# Patient Record
Sex: FEMALE | Race: WHITE | Employment: FULL TIME | URBAN - METROPOLITAN AREA
[De-identification: names, ages, dates, MRNs, and addresses within clinical notes are randomized per-mention and may not be internally consistent; named-entity substitution may affect disease eponyms.]

---

## 2017-04-26 ENCOUNTER — OFFICE VISIT (OUTPATIENT)
Dept: INTERNAL MEDICINE CLINIC | Age: 57
End: 2017-04-26

## 2017-04-26 VITALS
SYSTOLIC BLOOD PRESSURE: 129 MMHG | TEMPERATURE: 99.3 F | BODY MASS INDEX: 33.69 KG/M2 | HEART RATE: 78 BPM | WEIGHT: 204 LBS | OXYGEN SATURATION: 95 % | DIASTOLIC BLOOD PRESSURE: 87 MMHG

## 2017-04-26 DIAGNOSIS — J02.9 PHARYNGITIS, UNSPECIFIED ETIOLOGY: Primary | ICD-10-CM

## 2017-04-26 LAB — S PYO AG THROAT QL: NORMAL

## 2017-04-26 PROCEDURE — 99213 OFFICE O/P EST LOW 20 MIN: CPT | Performed by: NURSE PRACTITIONER

## 2017-04-26 PROCEDURE — 87880 STREP A ASSAY W/OPTIC: CPT | Performed by: NURSE PRACTITIONER

## 2017-04-26 RX ORDER — AMOXICILLIN 500 MG/1
500 CAPSULE ORAL 2 TIMES DAILY
Qty: 20 CAPSULE | Refills: 0 | Status: SHIPPED | OUTPATIENT
Start: 2017-04-26 | End: 2017-05-06

## 2017-04-28 LAB — THROAT CULTURE: NORMAL

## 2017-05-30 RX ORDER — AMLODIPINE BESYLATE 10 MG/1
10 TABLET ORAL DAILY
Qty: 30 TABLET | Refills: 0 | Status: SHIPPED | OUTPATIENT
Start: 2017-05-30 | End: 2017-06-13 | Stop reason: SDUPTHER

## 2017-06-13 ENCOUNTER — OFFICE VISIT (OUTPATIENT)
Dept: INTERNAL MEDICINE CLINIC | Age: 57
End: 2017-06-13

## 2017-06-13 VITALS
WEIGHT: 207 LBS | SYSTOLIC BLOOD PRESSURE: 133 MMHG | OXYGEN SATURATION: 97 % | HEART RATE: 74 BPM | DIASTOLIC BLOOD PRESSURE: 84 MMHG | BODY MASS INDEX: 34.18 KG/M2

## 2017-06-13 DIAGNOSIS — I10 ESSENTIAL HYPERTENSION: ICD-10-CM

## 2017-06-13 DIAGNOSIS — Z01.818 PREOP EXAMINATION: Primary | ICD-10-CM

## 2017-06-13 DIAGNOSIS — M16.12 OSTEOARTHRITIS OF LEFT HIP, UNSPECIFIED OSTEOARTHRITIS TYPE: ICD-10-CM

## 2017-06-13 DIAGNOSIS — E03.9 ACQUIRED HYPOTHYROIDISM: ICD-10-CM

## 2017-06-13 DIAGNOSIS — Z86.718 HISTORY OF DVT (DEEP VEIN THROMBOSIS): ICD-10-CM

## 2017-06-13 DIAGNOSIS — Z01.818 PREOP EXAMINATION: ICD-10-CM

## 2017-06-13 LAB
HCT VFR BLD CALC: 41.6 % (ref 36–48)
HEMOGLOBIN: 13.6 G/DL (ref 12–16)
INR BLD: 0.96 (ref 0.85–1.15)
MCH RBC QN AUTO: 28.1 PG (ref 26–34)
MCHC RBC AUTO-ENTMCNC: 32.7 G/DL (ref 31–36)
MCV RBC AUTO: 85.9 FL (ref 80–100)
PDW BLD-RTO: 14.1 % (ref 12.4–15.4)
PLATELET # BLD: 247 K/UL (ref 135–450)
PMV BLD AUTO: 8.2 FL (ref 5–10.5)
PROTHROMBIN TIME: 10.8 SEC (ref 9.6–13)
RBC # BLD: 4.84 M/UL (ref 4–5.2)
WBC # BLD: 6.8 K/UL (ref 4–11)

## 2017-06-13 PROCEDURE — 99214 OFFICE O/P EST MOD 30 MIN: CPT | Performed by: INTERNAL MEDICINE

## 2017-06-13 PROCEDURE — 93000 ELECTROCARDIOGRAM COMPLETE: CPT | Performed by: INTERNAL MEDICINE

## 2017-06-13 RX ORDER — AMLODIPINE BESYLATE 10 MG/1
10 TABLET ORAL DAILY
Qty: 90 TABLET | Refills: 1 | Status: SHIPPED | OUTPATIENT
Start: 2017-06-13 | End: 2018-01-04 | Stop reason: SDUPTHER

## 2017-06-14 LAB
ANION GAP SERPL CALCULATED.3IONS-SCNC: 20 MMOL/L (ref 3–16)
BUN BLDV-MCNC: 15 MG/DL (ref 7–20)
CALCIUM SERPL-MCNC: 9.4 MG/DL (ref 8.3–10.6)
CHLORIDE BLD-SCNC: 101 MMOL/L (ref 99–110)
CO2: 23 MMOL/L (ref 21–32)
CREAT SERPL-MCNC: 0.7 MG/DL (ref 0.6–1.1)
GFR AFRICAN AMERICAN: >60
GFR NON-AFRICAN AMERICAN: >60
GLUCOSE BLD-MCNC: 92 MG/DL (ref 70–99)
POTASSIUM SERPL-SCNC: 3.9 MMOL/L (ref 3.5–5.1)
SODIUM BLD-SCNC: 144 MMOL/L (ref 136–145)
TSH REFLEX: 4.03 UIU/ML (ref 0.27–4.2)

## 2017-06-15 RX ORDER — LEVOTHYROXINE SODIUM 0.07 MG/1
TABLET ORAL
Qty: 90 TABLET | Refills: 0 | Status: SHIPPED | OUTPATIENT
Start: 2017-06-15 | End: 2017-08-22 | Stop reason: SDUPTHER

## 2017-06-16 LAB — MRSA CULTURE ONLY: NORMAL

## 2017-08-18 DIAGNOSIS — E03.9 ACQUIRED HYPOTHYROIDISM: ICD-10-CM

## 2017-08-18 LAB — TSH REFLEX: 2.42 UIU/ML (ref 0.27–4.2)

## 2017-08-21 ENCOUNTER — TELEPHONE (OUTPATIENT)
Dept: INTERNAL MEDICINE CLINIC | Age: 57
End: 2017-08-21

## 2017-08-22 RX ORDER — LEVOTHYROXINE SODIUM 0.07 MG/1
TABLET ORAL
Qty: 90 TABLET | Refills: 0 | Status: SHIPPED | OUTPATIENT
Start: 2017-08-22 | End: 2017-10-03 | Stop reason: SDUPTHER

## 2017-10-03 ENCOUNTER — TELEPHONE (OUTPATIENT)
Dept: INTERNAL MEDICINE CLINIC | Age: 57
End: 2017-10-03

## 2017-10-03 RX ORDER — LEVOTHYROXINE SODIUM 0.07 MG/1
TABLET ORAL
Qty: 90 TABLET | Refills: 0 | Status: SHIPPED | OUTPATIENT
Start: 2017-10-03 | End: 2018-01-04 | Stop reason: SDUPTHER

## 2017-10-03 NOTE — TELEPHONE ENCOUNTER
Pt needs refill of levothyroxine (SYNTHROID) 75 MCG tablet [[id number]]     [facility] [address], [place]

## 2017-11-28 ENCOUNTER — TELEPHONE (OUTPATIENT)
Dept: INTERNAL MEDICINE CLINIC | Age: 57
End: 2017-11-28

## 2017-11-28 ENCOUNTER — PATIENT MESSAGE (OUTPATIENT)
Dept: INTERNAL MEDICINE CLINIC | Age: 57
End: 2017-11-28

## 2017-11-28 NOTE — TELEPHONE ENCOUNTER
Pt stated that she has found a mass on the apex of her left mandible. She has been watching it for a month now. She is requesting a appt with Dr Jordana Mclean, pt refused appt with another doctor. Pt wants someone to do a fine needle aspiration to find out what it is. Please advise.

## 2017-11-29 NOTE — TELEPHONE ENCOUNTER
From: Max Tineo  To: Rafaela Mills MD  Sent: 11/28/2017 6:05 PM EST  Subject: Non-Urgent Medical Question    Hi Dr. Jason Morton: This is NOT an non-urgent medical question, but rather an urgent one. I have a mass that has developed at the apex of the mandible on the left side. It is approximately 3/4\" in diameter and hard. It is movable, but seems to be embedded in muscle. I called your office today to try to move my appointment with you on 12/19/17 up. Naturally I am highly concerned that it is malignant. I left a detailed message with Nic German who informed me the nurse would call me back, however, your staff has ignored this message and did not return my call. I need to know if you can do a fine needle aspirate in your office. If not, can you please refer me immediately to someone who can. Can you please respond asap.      Thank you,  Joy Lopez

## 2017-12-04 ENCOUNTER — OFFICE VISIT (OUTPATIENT)
Dept: ENT CLINIC | Age: 57
End: 2017-12-04

## 2017-12-04 VITALS — DIASTOLIC BLOOD PRESSURE: 82 MMHG | HEART RATE: 72 BPM | SYSTOLIC BLOOD PRESSURE: 124 MMHG | HEIGHT: 66 IN

## 2017-12-04 DIAGNOSIS — K11.8 PAROTID MASS: Primary | ICD-10-CM

## 2017-12-04 PROCEDURE — 99203 OFFICE O/P NEW LOW 30 MIN: CPT | Performed by: OTOLARYNGOLOGY

## 2017-12-04 RX ORDER — CEFDINIR 300 MG/1
300 CAPSULE ORAL 2 TIMES DAILY
Qty: 20 CAPSULE | Refills: 0 | Status: SHIPPED | OUTPATIENT
Start: 2017-12-04 | End: 2018-01-09 | Stop reason: CLARIF

## 2017-12-04 ASSESSMENT — ENCOUNTER SYMPTOMS
FACIAL SWELLING: 1
TROUBLE SWALLOWING: 0
SINUS PRESSURE: 0
SINUS PAIN: 0
RESPIRATORY NEGATIVE: 1
RHINORRHEA: 0
ALLERGIC/IMMUNOLOGIC NEGATIVE: 1
SORE THROAT: 0
VOICE CHANGE: 0
EYES NEGATIVE: 1

## 2017-12-04 NOTE — PROGRESS NOTES
Subjective:      Patient ID: Diego Padilla is a 61 y.o. female. Past 1 month, the patient has noticed a firm lesion which is tender only to touch located behind the angle of the mandible on the left side. There has been no change in bleeding and no fever. She has not been on any medications. She does not smoke. There is no prior history of similar problem. HPI    Review of Systems   Constitutional: Negative. HENT: Positive for facial swelling. Negative for congestion, dental problem, drooling, ear discharge, ear pain, hearing loss, mouth sores, nosebleeds, postnasal drip, rhinorrhea, sinus pain, sinus pressure, sneezing, sore throat, tinnitus, trouble swallowing and voice change. Eyes: Negative. Respiratory: Negative. Endocrine: Negative. Skin: Negative. Allergic/Immunologic: Negative. Neurological: Negative. Hematological: Negative. Psychiatric/Behavioral: Negative. Objective:   Physical Exam   Constitutional: She is oriented to person, place, and time. She appears well-developed and well-nourished. HENT:   Head: Normocephalic and atraumatic. Right Ear: External ear normal.   Left Ear: External ear normal.   Nose: Nose normal.   Mouth/Throat: Oropharynx is clear and moist. No oropharyngeal exudate. Direct laryngoscopy is unremarkable. Eyes: Conjunctivae are normal. Pupils are equal, round, and reactive to light. Neck: Normal range of motion. Neck supple. No tracheal deviation present. No thyromegaly present. There is a 1 cm firm but not stony hard mobile mass located in the area of the left parotid near the tail. Cardiovascular: Normal rate and regular rhythm. Pulmonary/Chest: Effort normal.   Lymphadenopathy:     She has no cervical adenopathy. Neurological: She is alert and oriented to person, place, and time. Skin: Skin is warm and dry. Psychiatric: She has a normal mood and affect.  Her behavior is normal. Judgment and thought content normal. Assessment:      Findings of those of a parotid mass on the left side which could be a lymph node versus neoplastic process. Plan:      I have discussed this at length with the patient and we will proceed with a CT scan of the area and we will try a course of Omnicef. She may well need a fine-needle aspirate biopsy. She does seem to be more concerned with the possibility of it being a lymphoma which would be highly unlikely.

## 2017-12-08 ENCOUNTER — HOSPITAL ENCOUNTER (OUTPATIENT)
Dept: CT IMAGING | Age: 57
Discharge: HOME | End: 2017-12-08
Attending: OTOLARYNGOLOGY | Admitting: OTOLARYNGOLOGY

## 2017-12-08 DIAGNOSIS — K11.9 DISEASE OF SALIVARY GLAND: ICD-10-CM

## 2017-12-08 DIAGNOSIS — K11.8 PAROTID MASS: ICD-10-CM

## 2017-12-21 ENCOUNTER — TELEPHONE (OUTPATIENT)
Dept: ENT CLINIC | Age: 57
End: 2017-12-21

## 2017-12-21 DIAGNOSIS — K11.8 PAROTID MASS: Primary | ICD-10-CM

## 2017-12-22 NOTE — TELEPHONE ENCOUNTER
Patient returned call - Okay to schedule FNA - Doesn't matter which facility - would like to try to have before the new year so which ever facility can get her in soonest. ([facility]; [address] [facility])    Patient will be leaving out of town tomorrow and returning 12/28    Please contact on cell with appointment date and time [phone]

## 2017-12-27 NOTE — TELEPHONE ENCOUNTER
Call patient back and tell CT actually looks OK and no need for biopsy but I would like to see her on my return. Cancel test and call radiology.

## 2018-01-03 ENCOUNTER — TELEPHONE (OUTPATIENT)
Dept: ENT CLINIC | Age: 58
End: 2018-01-03

## 2018-01-03 DIAGNOSIS — K11.20 PAROTITIS: Primary | ICD-10-CM

## 2018-01-03 RX ORDER — DOXYCYCLINE 100 MG/1
100 CAPSULE ORAL 2 TIMES DAILY
Qty: 60 CAPSULE | Refills: 0 | Status: SHIPPED | OUTPATIENT
Start: 2018-01-03 | End: 2018-02-20 | Stop reason: CLARIF

## 2018-01-03 NOTE — TELEPHONE ENCOUNTER
Attempted to call patient. Left message that no FNA testing needs to be done at this time (no neoplasm no CA no tumor). Take antibiotic (sent to pharmacy) and mucinex and make appointment for 1 month. Call office with questions.

## 2018-01-04 ENCOUNTER — TELEPHONE (OUTPATIENT)
Dept: INTERNAL MEDICINE CLINIC | Age: 58
End: 2018-01-04

## 2018-01-04 RX ORDER — LEVOTHYROXINE SODIUM 0.07 MG/1
TABLET ORAL
Qty: 90 TABLET | Refills: 0 | Status: SHIPPED | OUTPATIENT
Start: 2018-01-04 | End: 2018-02-02 | Stop reason: SDUPTHER

## 2018-01-04 RX ORDER — AMLODIPINE BESYLATE 10 MG/1
10 TABLET ORAL DAILY
Qty: 90 TABLET | Refills: 0 | Status: SHIPPED | OUTPATIENT
Start: 2018-01-04 | End: 2018-02-02 | Stop reason: SDUPTHER

## 2018-01-04 NOTE — TELEPHONE ENCOUNTER
Last OV was 6/13/17 for Pre op  Was to return in December but canceled 2 FU  Has appt for later this month  Please advise on refills  They are pending for signature  Thank you!

## 2018-01-05 NOTE — TELEPHONE ENCOUNTER
Attempted to call patient. Left message again that NO FNA is necessary as there was NO neoplasm. Call office for 1 month F/U appointment. Take antibiotic X 1 month and mucinex (doxycycline - sent to pharmacy). Call office with questions.

## 2018-01-08 PROBLEM — K11.20 PAROTITIS: Status: RESOLVED | Noted: 2018-01-03 | Resolved: 2018-01-08

## 2018-01-09 ENCOUNTER — OFFICE VISIT (OUTPATIENT)
Dept: INTERNAL MEDICINE CLINIC | Age: 58
End: 2018-01-09

## 2018-01-09 VITALS
SYSTOLIC BLOOD PRESSURE: 120 MMHG | DIASTOLIC BLOOD PRESSURE: 82 MMHG | BODY MASS INDEX: 33.57 KG/M2 | WEIGHT: 208 LBS | HEART RATE: 78 BPM

## 2018-01-09 DIAGNOSIS — I10 ESSENTIAL HYPERTENSION: ICD-10-CM

## 2018-01-09 DIAGNOSIS — E66.9 OBESITY (BMI 30.0-34.9): ICD-10-CM

## 2018-01-09 DIAGNOSIS — E03.9 ACQUIRED HYPOTHYROIDISM: Primary | ICD-10-CM

## 2018-01-09 DIAGNOSIS — M19.90 ARTHRITIS: ICD-10-CM

## 2018-01-09 PROCEDURE — 99214 OFFICE O/P EST MOD 30 MIN: CPT | Performed by: INTERNAL MEDICINE

## 2018-01-09 ASSESSMENT — PATIENT HEALTH QUESTIONNAIRE - PHQ9
7. TROUBLE CONCENTRATING ON THINGS, SUCH AS READING THE NEWSPAPER OR WATCHING TELEVISION: 0
SUM OF ALL RESPONSES TO PHQ9 QUESTIONS 1 & 2: 4
4. FEELING TIRED OR HAVING LITTLE ENERGY: 3
SUM OF ALL RESPONSES TO PHQ QUESTIONS 1-9: 14
3. TROUBLE FALLING OR STAYING ASLEEP: 3
9. THOUGHTS THAT YOU WOULD BE BETTER OFF DEAD, OR OF HURTING YOURSELF: 1
6. FEELING BAD ABOUT YOURSELF - OR THAT YOU ARE A FAILURE OR HAVE LET YOURSELF OR YOUR FAMILY DOWN: 0
1. LITTLE INTEREST OR PLEASURE IN DOING THINGS: 1
5. POOR APPETITE OR OVEREATING: 3
2. FEELING DOWN, DEPRESSED OR HOPELESS: 3
8. MOVING OR SPEAKING SO SLOWLY THAT OTHER PEOPLE COULD HAVE NOTICED. OR THE OPPOSITE, BEING SO FIGETY OR RESTLESS THAT YOU HAVE BEEN MOVING AROUND A LOT MORE THAN USUAL: 0

## 2018-01-09 NOTE — PROGRESS NOTES
[address] Internal Medicine  Patient Encounter   Jennifer Larson MD    1/9/2018    Aníbal Kenyon  SUF:[birth year]       Assessment/Plan:  Acquired hypothyroidism  Symptomatically euthyroid. Continue current dose of levothyroxine. Check TSH early February (recently ran out and missed some doses) and adjust dose if indicated. Essential hypertension  At goal.  Continue current meds. -     Comprehensive Metabolic Panel; Future    Arthritis  Found very manageable with meloxicam. Would like to use that or celebrex on regular basis but first checking with insurance on formulary While waiting, will try tylenol arthritis. . Discussed risk and benefits of NSAIDS and regular monitoring needed. Obesity  Stopped phentermine due to effects on BP. Getting back to walking as temps out of single digits. Neck/parotid nodule  Working with Dr Aida Gaucher, now on doxycycline for a month. Will notify him if increases (normal CT scan)    Discussed medications with patient who voiced understanding of their use and indications. All questions answered. Return in about 6 months (around 7/9/2018). Medical assistant triage:  Chief Complaint   Patient presents with    6 Month Follow-Up     due for mammogram      HPI:   Patient presents for follow-up of   1. Acquired hypothyroidism    2. Essential hypertension    3. Arthritis    4. Obesity (BMI 30.0-34. 9)      Also wants to discuss pain from her arthritis. Noticed that when she stopped the meloxicam after surgery, she had a lot more pain and even affects energy level. Has been using ibuprofen. Gets heartburn when uses the ibuprofen, but didn't have with the meloxicam, but was also on omeprazole with it. Wants to try celebrex. Doesn't have formulary yet for new insurance. Has seen Dr. Aida Gaucher for the mass in parotid. Has had CT scan. Just started on  Doxycycline this am for a month and follows up with him in a month. Not sure what next step is. Stable for about 3 months. from Last 3 Encounters:   01/09/18 120/82   12/04/17 124/82   06/13/17 133/84      Wt Readings from Last 3 Encounters:   01/09/18 208 lb (94.3 kg)   06/13/17 207 lb (93.9 kg)   04/26/17 204 lb (92.5 kg)     Vitals:    01/09/18 0957   BP: 120/82   Pulse: 78   Weight: 208 lb (94.3 kg)     Body mass index is 33.57 kg/m². Physical Exam   Constitutional: No distress. Neck:   approx 1 cm firm smooth mobile nodule present at upper anterior chain versus tail of parotid. Cardiovascular: Normal rate and regular rhythm. Pulmonary/Chest: Effort normal and breath sounds normal.   Musculoskeletal: She exhibits no edema.

## 2018-02-01 ENCOUNTER — PATIENT MESSAGE (OUTPATIENT)
Dept: INTERNAL MEDICINE CLINIC | Age: 58
End: 2018-02-01

## 2018-02-02 RX ORDER — AMLODIPINE BESYLATE 10 MG/1
10 TABLET ORAL DAILY
Qty: 90 TABLET | Refills: 1 | Status: SHIPPED | OUTPATIENT
Start: 2018-02-02 | End: 2018-06-29 | Stop reason: SDUPTHER

## 2018-02-02 RX ORDER — LEVOTHYROXINE SODIUM 0.07 MG/1
TABLET ORAL
Qty: 90 TABLET | Refills: 1 | Status: SHIPPED | OUTPATIENT
Start: 2018-02-02 | End: 2018-06-29 | Stop reason: SDUPTHER

## 2018-02-20 ENCOUNTER — OFFICE VISIT (OUTPATIENT)
Dept: INTERNAL MEDICINE CLINIC | Age: 58
End: 2018-02-20

## 2018-02-20 VITALS — SYSTOLIC BLOOD PRESSURE: 122 MMHG | HEART RATE: 76 BPM | DIASTOLIC BLOOD PRESSURE: 86 MMHG

## 2018-02-20 DIAGNOSIS — Z01.818 PREOP EXAM FOR INTERNAL MEDICINE: Primary | ICD-10-CM

## 2018-02-20 DIAGNOSIS — Z13.31 POSITIVE DEPRESSION SCREENING: ICD-10-CM

## 2018-02-20 DIAGNOSIS — G47.30 SLEEP APNEA, UNSPECIFIED TYPE: ICD-10-CM

## 2018-02-20 DIAGNOSIS — G47.00 INSOMNIA, UNSPECIFIED TYPE: ICD-10-CM

## 2018-02-20 DIAGNOSIS — G47.33 OBSTRUCTIVE SLEEP APNEA SYNDROME: ICD-10-CM

## 2018-02-20 PROCEDURE — 99243 OFF/OP CNSLTJ NEW/EST LOW 30: CPT | Performed by: INTERNAL MEDICINE

## 2018-02-20 PROCEDURE — G8431 POS CLIN DEPRES SCRN F/U DOC: HCPCS | Performed by: INTERNAL MEDICINE

## 2018-02-20 NOTE — PROGRESS NOTES
Additional note:   Patient had a positive pH Q9 last month while here. Declines any form of treatment at this point states is related to her worry about the mass. She is currently dealing with.

## 2018-02-20 NOTE — PROGRESS NOTES
[address]- Internal Medicine  Preoperative Consultation  Gavin Leigh MD    2018    Mirella Lima  :  1960    Chief Complaint   Patient presents with    Pre-op Exam     patient is having parotid mass removal surgery on 2018 at [facility].        HPI:  This patient presents to the office today for a preoperative consultation at the request of Dr. Favio Olson to assess stability of patient's medical condition(s) listed below. She will be having left parotid mass excision. Mass left mandible/parotid area since approx October-November. Biopsy was negative but increasing size and discomfort. HTN:No chest pain or dyspnea. No problem with medication. Hypothyroid:  Denies any symptoms relatable to thyroid. ROS  Known anesthesia problems: vomiting only   Bleeding risk: No recent or remote history of abnormal bleeding  Personal or FH of DVT/PE: Yes - DVT after arthroscopy of knee . Family history as well. Recent steroid use: medrol pack early February. Exercise tolerance: good; swims 45 minutes non-stop. Past Medical History:   Diagnosis Date    Abnormal finding on EKG 3/21/2013    Normal stress myoview 3/21/13     Arthritis     widespread, former ballerina    Degenerative disc disease, cervical     cervical  disc fusion C4-C7;  Dr. Florencia Deleon    History of depression     History of DVT (deep vein thrombosis)     s/p arthroscopy, left    Migraines     Sleep apnea     no cpap    Thyroid disease      Past Surgical History:   Procedure Laterality Date    CERVICAL FUSION      C4-7, decompressive anterior discectomies and foraminotomiesC4-7;Bohinski    HIP ARTHROPLASTY Right [id number]    RIGHT TOTAL HIP ARTHROPLASTY ANTERIOR APPROACH    HIP ARTHROPLASTY Left 2017    JOINT REPLACEMENT Left 2017    Dr. Cielo Omalley ARTHROSCOPY      bilateral, multiple    NASAL SEPTUM SURGERY      PLANTAR FASCIA SURGERY      left; Dr. Krystina Paez      left;

## 2018-02-21 ENCOUNTER — TELEPHONE (OUTPATIENT)
Dept: INTERNAL MEDICINE CLINIC | Age: 58
End: 2018-02-21

## 2018-02-21 ENCOUNTER — NURSE ONLY (OUTPATIENT)
Dept: INTERNAL MEDICINE CLINIC | Age: 58
End: 2018-02-21

## 2018-02-21 DIAGNOSIS — Z01.818 PRE-OP EXAM: Primary | ICD-10-CM

## 2018-02-21 NOTE — TELEPHONE ENCOUNTER
Patient came in today to have pre-op ekg performed for her surgery on Tuesday. Patient was frustrated that she had to be here again today to have test performed. I got patient ready for ekg and attached all the lead stickers, but after hooking up the ekg to the computer, the computer was not responding to the ekg. I then moved the computer from another room to the one I was in to keep from having to move the patient. The ekg connected but the left leg lead would not read. I attempted multiple times to get it to connect, including changing the stickers, taping them down and utilizing another ekg machine to try to get the ekg to work. I apologized multiple times to the patient since I knew she felt inconvenienced. Both ekg machines would not read the left leg lead. Patient was becoming increasingly irate and using foul language. Patient then sat up and jerked all the leads off of her, and the half gown she was wearing and crumbled it up and threw it at me. I stepped out of the way, and patient got dressed and very aggressively swung the door open and stomped out. I was almost hit with the door as she left.

## 2018-02-22 ENCOUNTER — PATIENT MESSAGE (OUTPATIENT)
Dept: INTERNAL MEDICINE CLINIC | Age: 58
End: 2018-02-22

## 2018-02-22 NOTE — TELEPHONE ENCOUNTER
From: Lethaniel Buerger  To: Brandee Barry MD  Sent: 2/22/2018 6:33 AM EST  Subject: Visit Follow-Up Question    Dr. Radha Cosem: I will call [facility] and find out the name of the person I spoke with yesterday. I will complain, but nothing will be done. Some other person will pay for a test they don't need, accumulate debt they don't need, and may have less ability to deal with it than I have. While I have the utmost respect for your medical advice and have generally had good experiences in your office (except for yesterday), I have been really let down when it comes to this mass. The consequences to the medical community are none. To me, however, they are huge. You do not know the delicate dance I do daily-my efforts to be proactive for my health will continue to fail. I give up. Whatever happens is what I will get.  [id number] [facility]

## 2018-06-29 ENCOUNTER — OFFICE VISIT (OUTPATIENT)
Dept: INTERNAL MEDICINE CLINIC | Age: 58
End: 2018-06-29

## 2018-06-29 VITALS
SYSTOLIC BLOOD PRESSURE: 132 MMHG | BODY MASS INDEX: 33.41 KG/M2 | HEART RATE: 80 BPM | WEIGHT: 207 LBS | DIASTOLIC BLOOD PRESSURE: 90 MMHG

## 2018-06-29 DIAGNOSIS — R73.9 HYPERGLYCEMIA: ICD-10-CM

## 2018-06-29 DIAGNOSIS — K11.8 PAROTID MASS: ICD-10-CM

## 2018-06-29 DIAGNOSIS — E03.9 ACQUIRED HYPOTHYROIDISM: ICD-10-CM

## 2018-06-29 DIAGNOSIS — I10 ESSENTIAL HYPERTENSION: ICD-10-CM

## 2018-06-29 DIAGNOSIS — F32.A DEPRESSION, UNSPECIFIED DEPRESSION TYPE: Primary | ICD-10-CM

## 2018-06-29 DIAGNOSIS — G47.00 INSOMNIA, UNSPECIFIED TYPE: ICD-10-CM

## 2018-06-29 PROCEDURE — G0444 DEPRESSION SCREEN ANNUAL: HCPCS | Performed by: INTERNAL MEDICINE

## 2018-06-29 PROCEDURE — 99214 OFFICE O/P EST MOD 30 MIN: CPT | Performed by: INTERNAL MEDICINE

## 2018-06-29 RX ORDER — FLUOXETINE 20 MG/1
20 TABLET, FILM COATED ORAL DAILY
Qty: 30 TABLET | Refills: 3 | Status: SHIPPED | OUTPATIENT
Start: 2018-06-29 | End: 2018-08-06 | Stop reason: SDUPTHER

## 2018-06-29 RX ORDER — LORAZEPAM 1 MG/1
.5-1 TABLET ORAL NIGHTLY PRN
Qty: 15 TABLET | Refills: 0 | Status: SHIPPED | OUTPATIENT
Start: 2018-06-29 | End: 2018-07-14

## 2018-06-29 RX ORDER — FLUOXETINE HYDROCHLORIDE 20 MG/1
20 CAPSULE ORAL DAILY
Qty: 30 CAPSULE | Refills: 5 | Status: SHIPPED | OUTPATIENT
Start: 2018-06-29 | End: 2018-08-06

## 2018-06-29 RX ORDER — AMLODIPINE BESYLATE 10 MG/1
10 TABLET ORAL DAILY
Qty: 30 TABLET | Refills: 5 | Status: SHIPPED | OUTPATIENT
Start: 2018-06-29 | End: 2019-06-11 | Stop reason: SDUPTHER

## 2018-06-29 RX ORDER — LEVOTHYROXINE SODIUM 0.07 MG/1
TABLET ORAL
Qty: 30 TABLET | Refills: 5 | Status: SHIPPED | OUTPATIENT
Start: 2018-06-29 | End: 2018-12-30 | Stop reason: SDUPTHER

## 2018-06-29 ASSESSMENT — PATIENT HEALTH QUESTIONNAIRE - PHQ9
8. MOVING OR SPEAKING SO SLOWLY THAT OTHER PEOPLE COULD HAVE NOTICED. OR THE OPPOSITE, BEING SO FIGETY OR RESTLESS THAT YOU HAVE BEEN MOVING AROUND A LOT MORE THAN USUAL: 0
5. POOR APPETITE OR OVEREATING: 3
6. FEELING BAD ABOUT YOURSELF - OR THAT YOU ARE A FAILURE OR HAVE LET YOURSELF OR YOUR FAMILY DOWN: 2
4. FEELING TIRED OR HAVING LITTLE ENERGY: 2
10. IF YOU CHECKED OFF ANY PROBLEMS, HOW DIFFICULT HAVE THESE PROBLEMS MADE IT FOR YOU TO DO YOUR WORK, TAKE CARE OF THINGS AT HOME, OR GET ALONG WITH OTHER PEOPLE: 1
2. FEELING DOWN, DEPRESSED OR HOPELESS: 3
SUM OF ALL RESPONSES TO PHQ QUESTIONS 1-9: 17
3. TROUBLE FALLING OR STAYING ASLEEP: 3
SUM OF ALL RESPONSES TO PHQ9 QUESTIONS 1 & 2: 6
1. LITTLE INTEREST OR PLEASURE IN DOING THINGS: 3
7. TROUBLE CONCENTRATING ON THINGS, SUCH AS READING THE NEWSPAPER OR WATCHING TELEVISION: 1

## 2018-07-25 RX ORDER — MELOXICAM 7.5 MG/1
TABLET ORAL
Qty: 30 TABLET | Refills: 0 | Status: SHIPPED | OUTPATIENT
Start: 2018-07-25 | End: 2018-08-26 | Stop reason: SDUPTHER

## 2018-08-06 ENCOUNTER — PATIENT MESSAGE (OUTPATIENT)
Dept: INTERNAL MEDICINE CLINIC | Age: 58
End: 2018-08-06

## 2018-08-06 RX ORDER — FLUOXETINE 20 MG/1
30 TABLET, FILM COATED ORAL DAILY
Qty: 45 TABLET | Refills: 3 | Status: SHIPPED | OUTPATIENT
Start: 2018-08-06 | End: 2018-12-11 | Stop reason: ALTCHOICE

## 2018-08-06 NOTE — TELEPHONE ENCOUNTER
From: Nimisha Casiano  To: Sulaiman Hinton MD  Sent: 8/6/2018 8:20 AM EDT  Subject: Visit Follow-Up Question    Dr. Virginia Valenzuela:    Continuing previous email. 2. I have been sleeping better (and a lot), but I still don't feel normal energy. I only used 3 of the Lorazepam and I don't feel lack of sleep is the issue in spite of my noted sleep apnea. Please let me know your thoughts on dosage/drug choice. As a side note, my  is in the place I was and has also been there a long time. It is interesting for me to see these symptoms now from a different perspective. I've been trying to get him help (not myself of course) for a long time but he is stubborn. Now seeing the difference in me, I think I 've finally gotten through to him. So I'll be finding him help as soon as I gain decent health insurance again.      I look forward to your reply,  Anjelica Adames

## 2018-08-27 RX ORDER — MELOXICAM 7.5 MG/1
TABLET ORAL
Qty: 30 TABLET | Refills: 0 | Status: SHIPPED | OUTPATIENT
Start: 2018-08-27 | End: 2018-10-25 | Stop reason: SDUPTHER

## 2018-12-03 RX ORDER — MELOXICAM 7.5 MG/1
TABLET ORAL
Qty: 30 TABLET | Refills: 0 | Status: SHIPPED | OUTPATIENT
Start: 2018-12-03 | End: 2018-12-30 | Stop reason: SDUPTHER

## 2018-12-03 RX ORDER — LEVOTHYROXINE SODIUM 0.07 MG/1
TABLET ORAL
Qty: 30 TABLET | Refills: 0 | Status: SHIPPED | OUTPATIENT
Start: 2018-12-03 | End: 2018-12-11 | Stop reason: CLARIF

## 2018-12-10 PROBLEM — F32.A DEPRESSION: Status: ACTIVE | Noted: 2018-12-10

## 2018-12-11 ENCOUNTER — OFFICE VISIT (OUTPATIENT)
Dept: INTERNAL MEDICINE CLINIC | Age: 58
End: 2018-12-11
Payer: COMMERCIAL

## 2018-12-11 VITALS
WEIGHT: 197 LBS | HEART RATE: 76 BPM | DIASTOLIC BLOOD PRESSURE: 78 MMHG | BODY MASS INDEX: 31.8 KG/M2 | SYSTOLIC BLOOD PRESSURE: 120 MMHG | OXYGEN SATURATION: 98 %

## 2018-12-11 DIAGNOSIS — I10 ESSENTIAL HYPERTENSION: ICD-10-CM

## 2018-12-11 DIAGNOSIS — Z13.31 POSITIVE DEPRESSION SCREENING: ICD-10-CM

## 2018-12-11 DIAGNOSIS — F32.A DEPRESSION, UNSPECIFIED DEPRESSION TYPE: Primary | ICD-10-CM

## 2018-12-11 DIAGNOSIS — H81.10 BENIGN PAROXYSMAL POSITIONAL VERTIGO, UNSPECIFIED LATERALITY: ICD-10-CM

## 2018-12-11 DIAGNOSIS — E03.9 ACQUIRED HYPOTHYROIDISM: ICD-10-CM

## 2018-12-11 PROCEDURE — 99214 OFFICE O/P EST MOD 30 MIN: CPT | Performed by: INTERNAL MEDICINE

## 2018-12-11 PROCEDURE — G8431 POS CLIN DEPRES SCRN F/U DOC: HCPCS | Performed by: INTERNAL MEDICINE

## 2018-12-11 PROCEDURE — G8417 CALC BMI ABV UP PARAM F/U: HCPCS | Performed by: INTERNAL MEDICINE

## 2018-12-11 PROCEDURE — G8427 DOCREV CUR MEDS BY ELIG CLIN: HCPCS | Performed by: INTERNAL MEDICINE

## 2018-12-11 PROCEDURE — 3017F COLORECTAL CA SCREEN DOC REV: CPT | Performed by: INTERNAL MEDICINE

## 2018-12-11 PROCEDURE — 1036F TOBACCO NON-USER: CPT | Performed by: INTERNAL MEDICINE

## 2018-12-11 PROCEDURE — G8482 FLU IMMUNIZE ORDER/ADMIN: HCPCS | Performed by: INTERNAL MEDICINE

## 2018-12-11 PROCEDURE — 90471 IMMUNIZATION ADMIN: CPT | Performed by: INTERNAL MEDICINE

## 2018-12-11 PROCEDURE — 90686 IIV4 VACC NO PRSV 0.5 ML IM: CPT | Performed by: INTERNAL MEDICINE

## 2018-12-11 RX ORDER — BUPROPION HYDROCHLORIDE 150 MG/1
TABLET ORAL
Qty: 53 TABLET | Refills: 0 | Status: SHIPPED | OUTPATIENT
Start: 2018-12-11 | End: 2018-12-20 | Stop reason: SDUPTHER

## 2018-12-11 RX ORDER — MECLIZINE HYDROCHLORIDE 25 MG/1
25 TABLET ORAL 3 TIMES DAILY PRN
Qty: 20 TABLET | Refills: 0 | Status: SHIPPED | OUTPATIENT
Start: 2018-12-11 | End: 2018-12-21

## 2018-12-13 ENCOUNTER — TELEPHONE (OUTPATIENT)
Dept: FAMILY MEDICINE CLINIC | Age: 58
End: 2018-12-13

## 2018-12-20 ENCOUNTER — TELEPHONE (OUTPATIENT)
Dept: INTERNAL MEDICINE CLINIC | Age: 58
End: 2018-12-20

## 2018-12-20 RX ORDER — BUPROPION HYDROCHLORIDE 300 MG/1
300 TABLET ORAL EVERY MORNING
Qty: 30 TABLET | Refills: 5 | Status: SHIPPED | OUTPATIENT
Start: 2018-12-20 | End: 2019-06-11

## 2018-12-30 RX ORDER — MELOXICAM 7.5 MG/1
TABLET ORAL
Qty: 30 TABLET | Refills: 0 | Status: SHIPPED | OUTPATIENT
Start: 2018-12-30 | End: 2019-02-04 | Stop reason: SDUPTHER

## 2018-12-30 RX ORDER — LEVOTHYROXINE SODIUM 0.07 MG/1
TABLET ORAL
Qty: 30 TABLET | Refills: 0 | Status: SHIPPED | OUTPATIENT
Start: 2018-12-30 | End: 2019-02-02 | Stop reason: SDUPTHER

## 2019-02-04 ENCOUNTER — TELEPHONE (OUTPATIENT)
Dept: INTERNAL MEDICINE CLINIC | Age: 59
End: 2019-02-04

## 2019-02-04 RX ORDER — LEVOTHYROXINE SODIUM 0.07 MG/1
TABLET ORAL
Qty: 30 TABLET | Refills: 4 | Status: SHIPPED | OUTPATIENT
Start: 2019-02-04 | End: 2019-03-12 | Stop reason: SDUPTHER

## 2019-02-04 RX ORDER — LEVOTHYROXINE SODIUM 0.07 MG/1
TABLET ORAL
Qty: 30 TABLET | Refills: 0 | Status: SHIPPED | OUTPATIENT
Start: 2019-02-04 | End: 2019-02-04 | Stop reason: SDUPTHER

## 2019-02-04 RX ORDER — MELOXICAM 7.5 MG/1
TABLET ORAL
Qty: 30 TABLET | Refills: 4 | Status: SHIPPED | OUTPATIENT
Start: 2019-02-04 | End: 2019-08-20

## 2019-03-12 ENCOUNTER — PATIENT MESSAGE (OUTPATIENT)
Dept: INTERNAL MEDICINE CLINIC | Age: 59
End: 2019-03-12

## 2019-03-12 RX ORDER — LEVOTHYROXINE SODIUM 0.07 MG/1
TABLET ORAL
Qty: 30 TABLET | Refills: 2 | Status: SHIPPED | OUTPATIENT
Start: 2019-03-12 | End: 2019-06-11 | Stop reason: SDUPTHER

## 2019-06-11 ENCOUNTER — OFFICE VISIT (OUTPATIENT)
Dept: INTERNAL MEDICINE CLINIC | Age: 59
End: 2019-06-11

## 2019-06-11 VITALS
SYSTOLIC BLOOD PRESSURE: 122 MMHG | BODY MASS INDEX: 33.89 KG/M2 | DIASTOLIC BLOOD PRESSURE: 86 MMHG | HEART RATE: 78 BPM | OXYGEN SATURATION: 98 % | WEIGHT: 210 LBS

## 2019-06-11 DIAGNOSIS — I10 ESSENTIAL HYPERTENSION: ICD-10-CM

## 2019-06-11 DIAGNOSIS — F32.A DEPRESSION, UNSPECIFIED DEPRESSION TYPE: Primary | ICD-10-CM

## 2019-06-11 PROCEDURE — 99213 OFFICE O/P EST LOW 20 MIN: CPT | Performed by: INTERNAL MEDICINE

## 2019-06-11 RX ORDER — MELOXICAM 7.5 MG/1
TABLET ORAL
Qty: 90 TABLET | Refills: 1 | Status: CANCELLED | OUTPATIENT
Start: 2019-06-11

## 2019-06-11 RX ORDER — AMLODIPINE BESYLATE 10 MG/1
10 TABLET ORAL DAILY
Qty: 90 TABLET | Refills: 1 | Status: SHIPPED | OUTPATIENT
Start: 2019-06-11 | End: 2020-03-25 | Stop reason: SDUPTHER

## 2019-06-11 RX ORDER — LEVOTHYROXINE SODIUM 0.07 MG/1
TABLET ORAL
Qty: 90 TABLET | Refills: 1 | Status: SHIPPED | OUTPATIENT
Start: 2019-06-11 | End: 2020-03-25 | Stop reason: SDUPTHER

## 2019-06-11 RX ORDER — VITAMIN B COMPLEX
1 CAPSULE ORAL DAILY
COMMUNITY
End: 2021-02-24

## 2019-06-11 ASSESSMENT — ENCOUNTER SYMPTOMS
CHEST TIGHTNESS: 0
SHORTNESS OF BREATH: 0

## 2019-06-11 NOTE — PROGRESS NOTES
[facility] Primary Care  Internal Medicine Progress Note  Ivan Vincent MD  2019    Bernard Kenyon  :1960    ASSESSMENT/PLAN:   Depression, unspecified depression type  Currently symptomatic. Start sertraline 50 mg, with half tablet for the first week. Essential hypertension  Well controlled. Due for labs however currently uninsured. Advised can hold off for the next 6 months, but needs to limit the meloxicam use. If she is continuing that with any frequency but then needs renal function checked. Benign paroxysmal positional vertigo, unspecified laterality  Continues with stable symptoms. Declines vestibular therapy at this time due to lack of insurance. Provided handout with 2 different types of home vertigo exercises. Discussed medications with patient who voiced understanding of their use and indications. All questions answered. Return in about 6 months (around 2019). Medical assistant triage:   Chief Complaint   Patient presents with    6 Month Follow-Up     due for pap and mammogram        HPI:   This 60 y.o. female here today for evaluation of the following medical concerns:    Back and forth to [place] a lot in past 6 months for mother. Still unemployed but expecting 2 offers this week. Currently uninsured. Depression:WB  mg did not work well for patient. Felt more depressed, but was also off of the fluoxetine. Had sweats on the fluoxetine. Swimming a lot and does find it helps psychologically. not feeling angry but still depressed. Wants to eat, sleep and drink all of the time. Wants to try something again. Still having vertigo. Lives with it. When supine and turns to right head,  spins. HTN: Plan with medication. No side effects. No headache or edema or chest pain or shortness of breath. Only using meloxicam prn now. Review of Systems   Respiratory: Negative for chest tightness and shortness of breath.     Cardiovascular: Negative for chest pain and leg swelling. As per HPI. Prior to Visit Medications    Medication Sig Taking? Authorizing Provider   meloxicam (MOBIC) 7.5 MG tablet TAKE ONE TABLET BY MOUTH DAILY AS NEEDED FOR PAIN Yes Ruddy Duffy MD   FLUoxetine (PROZAC) 20 MG tablet Take 20 mg by mouth daily  Yes Ruddy Duffy MD   levothyroxine (SYNTHROID) 75 MCG tablet TAKE ONE TABLET BY MOUTH DAILY TAKE WITH WATER ON AN EMPTY STOMACH WAIT 30 MINUTES BEFORE EATING OR TAKING OTHER MEDS Yes Ruddy Duffy MD   amLODIPine (NORVASC) 10 MG tablet Take 1 tablet by mouth daily Yes Ruddy Duffy MD   Cholecalciferol (VITAMIN D) 2000 UNITS CAPS capsule Take 4,000 Units by mouth daily. Ruddy Duffy MD     Social History     Tobacco Use   Smoking Status Former Smoker    Packs/day: 0.50    Years: 3.00    Pack years: 1.50    Last attempt to quit: 1991    Years since quittin.6   Smokeless Tobacco Never Used     OBJECTIVE:  BP Readings from Last 3 Encounters:   19 122/86   18 120/78   18 (!) 132/90      Wt Readings from Last 3 Encounters:   19 210 lb (95.3 kg)   18 197 lb (89.4 kg)   18 207 lb (93.9 kg)     Vitals:    19 0900   BP: 122/86   Pulse: 78   SpO2: 98%  Comment: 98   Weight: 210 lb (95.3 kg)     Body mass index is 33.89 kg/m². Physical Exam   Constitutional: No distress. HENT:   External auditory canals and TMs normal bilaterally   Cardiovascular: Normal rate and regular rhythm. Pulmonary/Chest: Effort normal and breath sounds normal.   Musculoskeletal: She exhibits no edema. Neurological: She displays a negative Romberg sign. Gait normal.   Psychiatric: She has a normal mood and affect.  Her behavior is normal. Thought content normal.

## 2019-06-11 NOTE — PATIENT INSTRUCTIONS
Patient Education        Benign Paroxysmal Positional Vertigo (BPPV): Care Instructions  Your Care Instructions    Benign paroxysmal positional vertigo, also called BPPV, is an inner ear problem. It causes a spinning or whirling sensation when you move your head. This sensation is called vertigo. The vertigo usually lasts for less than a minute. People often have vertigo spells for a few days or weeks. Then the vertigo goes away. But it may come back again. The vertigo may be mild, or it may be bad enough to cause unsteadiness, nausea, and vomiting. When you move, your inner ear sends messages to the brain. This helps you keep your balance. Vertigo can happen when debris builds up in the inner ear. The buildup can cause the inner ear to send the wrong message to the brain. Your doctor may move you in different positions to help your vertigo get better faster. This is called the Epley maneuver. Your doctor may also prescribe medicines or exercises to help with your symptoms. Follow-up care is a key part of your treatment and safety. Be sure to make and go to all appointments, and call your doctor if you are having problems. It's also a good idea to know your test results and keep a list of the medicines you take. How can you care for yourself at home? · If your doctor suggests that you do Lance-Daroff exercises:  ? Sit on the edge of a bed or sofa. Quickly lie down on the side that causes the worst vertigo. Lie on your side with your ear down. ? Stay in this position for at least 30 seconds or until the vertigo goes away. ? Sit up. If this causes vertigo, wait for it to stop. ? Repeat the procedure on the other side. ? Repeat this 10 times. Do these exercises 2 times a day until the vertigo is gone. When should you call for help? Call 911 anytime you think you may need emergency care. For example, call if:    · You have symptoms of a stroke.  These may include:  ? Sudden numbness, tingling, weakness, or loss of movement in your face, arm, or leg, especially on only one side of your body. ? Sudden vision changes. ? Sudden trouble speaking. ? Sudden confusion or trouble understanding simple statements. ? Sudden problems with walking or balance. ? A sudden, severe headache that is different from past headaches.    Call your doctor now or seek immediate medical care if:    · You have new or worse nausea and vomiting.     · You have new symptoms such as hearing loss or roaring in your ears.    Watch closely for changes in your health, and be sure to contact your doctor if:    · You are not getting better as expected.     · Your vertigo gets worse. Where can you learn more? Go to https://chpepiceweb.[employer]. org and sign in to your [employer] account. Enter  in the [facility] box to learn more about \"Benign Paroxysmal Positional Vertigo (BPPV): Care Instructions. \"     If you do not have an account, please click on the \"Sign Up Now\" link. Current as of: October 21, 2018  Content Version: 12.0  © 6930-2198 Healthwise, Incorporated. Care instructions adapted under license by [facility] ([facility]). If you have questions about a medical condition or this instruction, always ask your healthcare professional. [place] [address] any warranty or liability for your use of this information. Patient Education        Vertigo: Exercises  Your Care Instructions  Here are some examples of typical rehabilitation exercises for your condition. Start each exercise slowly. Ease off the exercise if you start to have pain. Your doctor or physical therapist will tell you when you can start these exercises and which ones will work best for you. How to do the exercises  Exercise 1    1. Stand with a chair in front of you and a wall behind you. If you begin to fall, you may use them for support. 2. Stand with your feet together and your arms at your sides.   3. Move your head up and down 10 times.    Exercise 2    1. Move your head side to side 10 times. Exercise 3    1. Move your head diagonally up and down 10 times. Exercise 4    1. Move your head diagonally up and down 10 times on the other side. Follow-up care is a key part of your treatment and safety. Be sure to make and go to all appointments, and call your doctor if you are having problems. It's also a good idea to know your test results and keep a list of the medicines you take. Where can you learn more? Go to https://chpepiceweb.[employer]. org and sign in to your [employer] account. Enter F349 in the [employer] box to learn more about \"Vertigo: Exercises. \"     If you do not have an account, please click on the \"Sign Up Now\" link. Current as of: October 21, 2018  Content Version: 12.0  © 5957-4229 [employer]. Care instructions adapted under license by [facility] ([facility]). If you have questions about a medical condition or this instruction, always ask your healthcare professional. [place] [address] any warranty or liability for your use of this information. Patient Education        [place] Exercises for Vertigo: Care Instructions  Your Care Instructions  Simple exercises can help you regain your balance when you have vertigo. If you have Ménière's disease, benign paroxysmal positional vertigo (BPPV), or another inner ear problem, you may have vertigo off and on. Do these exercises first thing in the morning and before you go to bed. You might get dizzy when you first start them. If this happens, try to do them for at least 5 minutes. Do a group of exercises at a time, starting at the top of the list. It may take several weeks before you can do all the exercises without feeling dizzy. Follow-up care is a key part of your treatment and safety. Be sure to make and go to all appointments, and call your doctor if you are having problems.  It's also a good idea to know your test results and keep a list of the medicines you take. How can you care for yourself at home? Exercise 1  While sitting on the side of the bed and holding your head still:  · Look up as far as you can. · Look down as far as you can. · Look from side to side as far as you can. · Stretch your arm straight out in front of you. Focus on your index finger. Continue to focus on your finger while you bring it to your nose. Exercise 2  While sitting on the side of the bed:  · Bring your head as far back as you can. · Bring your head forward to touch your chin to your chest.  · Turn your head from side to side. · Do these exercises first with your eyes open. Then try with your eyes closed. Exercise 3  While sitting on the side of the bed:  · Shrug your shoulders straight upward, then relax them. · Bend over and try to touch the ground with your fingers. Then go back to a sitting position. · Toss a small ball from one hand to the other. Throw the ball higher than your eyes so you have to look up. Exercise 4  While standing (with someone close by if you feel uncomfortable):  · Repeat Exercise 1.  · Repeat Exercise 2.  · Pass a ball between your legs and above your head. · Sit down and then stand up. Repeat. Turn around in a [place] a different way each time you stand. · With someone close by to help you, try the above exercises with your eyes closed. Exercise 5  In a room that is cleared of obstacles:  · Walk to a corner of the room, turn to your right, and walk back to the starting point. Now, repeat and turn left. · Walk up and down a slope. Now try stairs. · While holding on to someone's arm, try these exercises with your eyes closed. When should you call for help? Watch closely for changes in your health, and be sure to contact your doctor if:    · You do not get better as expected. Where can you learn more? Go to https://yamilet."[employer]". org and sign in to your [employer] account.  Enter V683 in the Search Health Information box to learn more about \"Cawthorne Exercises for Vertigo: Care Instructions. \"     If you do not have an account, please click on the \"Sign Up Now\" link. Current as of: October 21, 2018  Content Version: 12.0  © 4938-9105 Healthwise, Incorporated. Care instructions adapted under license by [facility] ([facility]). If you have questions about a medical condition or this instruction, always ask your healthcare professional. [address] any warranty or liability for your use of this information.

## 2019-08-19 ENCOUNTER — TELEPHONE (OUTPATIENT)
Dept: INTERNAL MEDICINE CLINIC | Age: 59
End: 2019-08-19

## 2019-08-19 DIAGNOSIS — R19.7 BLOODY DIARRHEA: Primary | ICD-10-CM

## 2019-08-20 ENCOUNTER — OFFICE VISIT (OUTPATIENT)
Dept: INTERNAL MEDICINE CLINIC | Age: 59
End: 2019-08-20

## 2019-08-20 ENCOUNTER — TELEPHONE (OUTPATIENT)
Dept: INTERNAL MEDICINE CLINIC | Age: 59
End: 2019-08-20

## 2019-08-20 VITALS
TEMPERATURE: 98.6 F | DIASTOLIC BLOOD PRESSURE: 82 MMHG | BODY MASS INDEX: 33.89 KG/M2 | SYSTOLIC BLOOD PRESSURE: 134 MMHG | WEIGHT: 210 LBS | HEART RATE: 76 BPM | OXYGEN SATURATION: 98 %

## 2019-08-20 DIAGNOSIS — R10.9 ACUTE ABDOMINAL PAIN: Primary | ICD-10-CM

## 2019-08-20 LAB
APPEARANCE FLUID: ABNORMAL
BILIRUBIN, POC: ABNORMAL
BLOOD URINE, POC: ABNORMAL
CLARITY, POC: ABNORMAL
COLOR, POC: YELLOW
GLUCOSE URINE, POC: ABNORMAL
KETONES, POC: ABNORMAL
LEUKOCYTE EST, POC: ABNORMAL
NITRITE, POC: ABNORMAL
PH, POC: 6
PROTEIN, POC: 30
SPECIFIC GRAVITY, POC: 1.03
UROBILINOGEN, POC: 0.2

## 2019-08-20 PROCEDURE — 81002 URINALYSIS NONAUTO W/O SCOPE: CPT | Performed by: INTERNAL MEDICINE

## 2019-08-20 PROCEDURE — 99212 OFFICE O/P EST SF 10 MIN: CPT | Performed by: INTERNAL MEDICINE

## 2019-08-20 RX ORDER — HYOSCYAMINE SULFATE 0.12 MG/1
0.12 TABLET SUBLINGUAL EVERY 4 HOURS PRN
Qty: 20 EACH | Refills: 0 | Status: SHIPPED | OUTPATIENT
Start: 2019-08-20 | End: 2021-02-24

## 2019-08-20 NOTE — PROGRESS NOTES
[facility]  Internal Medicine Progress Note  Bryan Carpenter MD    DOS: 2019    Yandy Kenyon  :1960    ASSESSMENT/PLAN:   Acute abdominal pain in the left-sided  Initially as a gastroenteritis. Differential diagnosis includes but is not limited to diverticulitis, colitis, partial obstruction or even nephrolithiasis. Ideally send patient to emergency room for further evaluation patient is currently uninsured. She is stable. Will order stat labs and stat CT of the abdomen. Discussed medications with patient who voiced understanding of their use and indications. All questions answered. Addendum:  Prelim CT acute findings. Labs also normal.  Patient. May have both abdominal wall tenderness from the retching as well as possibly some spasms. Courage hydration. We will try prescription of Levsin. Patient has been instructed to contact office if symptoms worsen    This note was generated completely or in part utilizing Dragon dictation speech recognition software. Occasionally, words are mistranscribed and despite editing, the text may contain inaccuracies due to incorrect word recognition. If further clarification is needed please contact the office at [phone] Assistant Triage:  Chief Complaint   Patient presents with    Abdominal Pain     left middle, now having very dark,poss black stools. HPI:   This is a 60 y. o.female. She is here today for abdominal pain. Had acute gastroenteritis over the weekend with some bloody diarrhea (see yesterday's phone note). Diarrhea slowed down yesterday and has had only one small bowel movement this morning that was just small formed stool. Today small pebble-type stool that looks dark, but not black. Had not taken pepto bismol. Waves of wretching if thinks about eating. Not exactly nausea. Eating soup, broth and today mini-bagel that has stayed down. This am severe left-sided abdominal pain.   Constant but Comment: 6/week    Drug use: No       BP Readings from Last 3 Encounters:   08/20/19 134/82   06/11/19 122/86   12/11/18 120/78     Wt Readings from Last 3 Encounters:   08/20/19 210 lb (95.3 kg)   06/11/19 210 lb (95.3 kg)   12/11/18 197 lb (89.4 kg)     OBJECTIVE:  Vitals:    08/20/19 1141   BP: 134/82   Pulse: 76   Temp: 98.6 °F (37 °C)   TempSrc: Oral   SpO2: 98%   Weight: 210 lb (95.3 kg)       Physical Exam   Eyes: No scleral icterus. Cardiovascular: Normal rate and regular rhythm. Pulmonary/Chest: Effort normal and breath sounds normal.   Abdominal: Soft. Normal appearance. She exhibits no distension and no mass. Bowel sounds are decreased. There is no hepatosplenomegaly. There is tenderness in the right lower quadrant, left upper quadrant and left lower quadrant. There is no rigidity, no rebound, no guarding and negative Plascencia's sign. Skin: No pallor.

## 2019-08-22 LAB — URINE CULTURE, ROUTINE: NORMAL

## 2020-02-25 ENCOUNTER — PATIENT MESSAGE (OUTPATIENT)
Dept: INTERNAL MEDICINE CLINIC | Age: 60
End: 2020-02-25

## 2020-03-26 RX ORDER — LEVOTHYROXINE SODIUM 0.07 MG/1
TABLET ORAL
Qty: 90 TABLET | Refills: 1 | Status: SHIPPED | OUTPATIENT
Start: 2020-03-26 | End: 2020-07-21 | Stop reason: SDUPTHER

## 2020-03-26 RX ORDER — AMLODIPINE BESYLATE 10 MG/1
10 TABLET ORAL DAILY
Qty: 90 TABLET | Refills: 1 | Status: SHIPPED | OUTPATIENT
Start: 2020-03-26 | End: 2021-02-24 | Stop reason: SDUPTHER

## 2020-04-02 ENCOUNTER — PATIENT MESSAGE (OUTPATIENT)
Dept: INTERNAL MEDICINE CLINIC | Age: 60
End: 2020-04-02

## 2020-04-02 NOTE — TELEPHONE ENCOUNTER
Given video visit  Patient was read the following consent and agreed to the virtual visit. We want to confirm that, for purposes of billing, this is a virtual visit with your provider for which we will submit a claim for reimbursement with your insurance company. You will be responsible for any copays, coinsurance amounts or other amounts not covered by your insurance company. If you do not accept this, unfortunately we will not be able to schedule a virtual visit with the provider. Do you accept?    Has new insurance and will give that information when called

## 2020-04-09 ENCOUNTER — TELEPHONE (OUTPATIENT)
Dept: INTERNAL MEDICINE CLINIC | Age: 60
End: 2020-04-09

## 2020-04-14 ENCOUNTER — TELEMEDICINE (OUTPATIENT)
Dept: INTERNAL MEDICINE CLINIC | Age: 60
End: 2020-04-14
Payer: COMMERCIAL

## 2020-04-14 VITALS — WEIGHT: 215 LBS | BODY MASS INDEX: 34.7 KG/M2

## 2020-04-14 PROCEDURE — 99213 OFFICE O/P EST LOW 20 MIN: CPT | Performed by: INTERNAL MEDICINE

## 2020-04-14 RX ORDER — BUPROPION HYDROCHLORIDE 150 MG/1
150 TABLET ORAL EVERY MORNING
Qty: 30 TABLET | Refills: 1 | Status: SHIPPED | OUTPATIENT
Start: 2020-04-14 | End: 2020-07-21 | Stop reason: SDUPTHER

## 2020-04-14 ASSESSMENT — ENCOUNTER SYMPTOMS
CHEST TIGHTNESS: 0
SHORTNESS OF BREATH: 0

## 2020-07-17 DIAGNOSIS — Z13.1 SCREENING FOR DIABETES MELLITUS: ICD-10-CM

## 2020-07-17 DIAGNOSIS — Z13.220 LIPID SCREENING: ICD-10-CM

## 2020-07-17 DIAGNOSIS — E03.9 ACQUIRED HYPOTHYROIDISM: ICD-10-CM

## 2020-07-17 DIAGNOSIS — I10 ESSENTIAL HYPERTENSION: ICD-10-CM

## 2020-07-17 LAB
ANION GAP SERPL CALCULATED.3IONS-SCNC: 14 MMOL/L (ref 3–16)
BUN BLDV-MCNC: 13 MG/DL (ref 7–20)
CALCIUM SERPL-MCNC: 9.3 MG/DL (ref 8.3–10.6)
CHLORIDE BLD-SCNC: 100 MMOL/L (ref 99–110)
CHOLESTEROL, TOTAL: 217 MG/DL (ref 0–199)
CO2: 24 MMOL/L (ref 21–32)
CREAT SERPL-MCNC: 0.8 MG/DL (ref 0.6–1.2)
GFR AFRICAN AMERICAN: >60
GFR NON-AFRICAN AMERICAN: >60
GLUCOSE BLD-MCNC: 119 MG/DL (ref 70–99)
HDLC SERPL-MCNC: 51 MG/DL (ref 40–60)
LDL CHOLESTEROL CALCULATED: 139 MG/DL
POTASSIUM SERPL-SCNC: 4.2 MMOL/L (ref 3.5–5.1)
SODIUM BLD-SCNC: 138 MMOL/L (ref 136–145)
TRIGL SERPL-MCNC: 136 MG/DL (ref 0–150)
TSH REFLEX: 1.43 UIU/ML (ref 0.27–4.2)
VLDLC SERPL CALC-MCNC: 27 MG/DL

## 2020-07-18 LAB
ESTIMATED AVERAGE GLUCOSE: 111.2 MG/DL
HBA1C MFR BLD: 5.5 %

## 2020-07-21 ENCOUNTER — VIRTUAL VISIT (OUTPATIENT)
Dept: INTERNAL MEDICINE CLINIC | Age: 60
End: 2020-07-21
Payer: COMMERCIAL

## 2020-07-21 PROBLEM — E78.00 HYPERCHOLESTEROLEMIA: Status: ACTIVE | Noted: 2020-07-21

## 2020-07-21 PROCEDURE — 99443 PR PHYS/QHP TELEPHONE EVALUATION 21-30 MIN: CPT | Performed by: INTERNAL MEDICINE

## 2020-07-21 RX ORDER — BUPROPION HYDROCHLORIDE 150 MG/1
150 TABLET ORAL EVERY MORNING
Qty: 30 TABLET | Refills: 5 | Status: SHIPPED | OUTPATIENT
Start: 2020-07-21 | End: 2021-02-24 | Stop reason: SDUPTHER

## 2020-07-21 RX ORDER — LEVOTHYROXINE SODIUM 0.07 MG/1
TABLET ORAL
Qty: 90 TABLET | Refills: 1 | Status: SHIPPED | OUTPATIENT
Start: 2020-07-21 | End: 2021-02-24 | Stop reason: SDUPTHER

## 2020-07-21 NOTE — PROGRESS NOTES
Shaan Cano is a 63 y.o. female evaluated via telephone on 7/21/2020 during [facility]. CC:  3 Month Follow-Up (depression)     HPI:   Recent eye check was told is showing fading pigment in retina, which could predispose to macular degeneration. Depression: feels good since adding in WB XL. Still hyperhidrosis but not like before. Thinks more weight related rather than med. That is in spite of all of the bad  Things going on around her including 's depression and drinking. Lots of fighting with  and daughter. Came in to office for recent BP check. Review of Systems         Patient-Reported Vitals 7/21/2020   Patient-Reported Systolic 663   Patient-Reported Diastolic 78      The 35-OAXY ASCVD risk score (Osito Soliz., et al., 2013) is: 6%    Values used to calculate the score:      Age: 63 years      Sex: Female      Is Non- : No      Diabetic: No      Tobacco smoker: No      Systolic Blood Pressure: 946 mmHg      Is BP treated: Yes      HDL Cholesterol: 51 mg/dL      Total Cholesterol: 217 mg/dL    Reviewed all recent labs with patient. .    ASSESSMENT/PLAN:  Hypercholesterolemia  10 yr cv risk 6%. Wants to work more on lifestyle, and recheck in 4-6 months. Orders placed. Due to family history, if continues to increase would then like to start rx     Depression, unspecified depression type  Well-controlled. Continue sertraline low dose (due to sweating) and wb xl 150    Acquired hypothyroidism  Stable, continue on same dose. Retinal pigment change. Advised seek opinion of ophthalmologist but no rush. Other orders  -     buPROPion (WELLBUTRIN XL) 150 MG extended release tablet;  Take 1 tablet by mouth every morning  -     levothyroxine (SYNTHROID) 75 MCG tablet; TAKE ONE TABLET BY MOUTH DAILY ON AN EMPTY STOMACH TAKE WITH WATER AND WAIT 30 MINUTES BEFORE EATING OR TAKING OTHER MEDS,      Return in about 6 months (around 1/21/2021) for ok for VV if need. An  electronic signature was used to authenticate this note. --Kita Arce MD on 7/21/2020 at 8:54 AM    Total Time: minutes: 21-30 minutes    Consent: She and/or health care decision maker is aware that that she may receive a bill for this telephone service, depending on her insurance coverage, and has provided verbal consent to proceed: Yes    I affirm this is a Patient Initiated Episode with an Established Patient who has not had a related appointment within my department in the past 7 days or scheduled within the next 24 hours.     Note: not billable if this call serves to triage the patient into an appointment for the relevant concern    Kita Arce

## 2021-02-24 ENCOUNTER — OFFICE VISIT (OUTPATIENT)
Dept: INTERNAL MEDICINE CLINIC | Age: 61
End: 2021-02-24
Payer: COMMERCIAL

## 2021-02-24 VITALS
TEMPERATURE: 97.2 F | WEIGHT: 213.8 LBS | SYSTOLIC BLOOD PRESSURE: 112 MMHG | BODY MASS INDEX: 34.51 KG/M2 | DIASTOLIC BLOOD PRESSURE: 70 MMHG | HEART RATE: 96 BPM

## 2021-02-24 DIAGNOSIS — Z12.31 ENCOUNTER FOR SCREENING MAMMOGRAM FOR MALIGNANT NEOPLASM OF BREAST: ICD-10-CM

## 2021-02-24 DIAGNOSIS — G47.30 SLEEP APNEA, UNSPECIFIED TYPE: ICD-10-CM

## 2021-02-24 DIAGNOSIS — I10 ESSENTIAL HYPERTENSION: Primary | ICD-10-CM

## 2021-02-24 DIAGNOSIS — E03.9 ACQUIRED HYPOTHYROIDISM: ICD-10-CM

## 2021-02-24 DIAGNOSIS — E78.00 HYPERCHOLESTEROLEMIA: ICD-10-CM

## 2021-02-24 DIAGNOSIS — F32.A DEPRESSION, UNSPECIFIED DEPRESSION TYPE: ICD-10-CM

## 2021-02-24 PROCEDURE — 99214 OFFICE O/P EST MOD 30 MIN: CPT | Performed by: INTERNAL MEDICINE

## 2021-02-24 PROCEDURE — 90472 IMMUNIZATION ADMIN EACH ADD: CPT | Performed by: INTERNAL MEDICINE

## 2021-02-24 PROCEDURE — 90750 HZV VACC RECOMBINANT IM: CPT | Performed by: INTERNAL MEDICINE

## 2021-02-24 PROCEDURE — 90686 IIV4 VACC NO PRSV 0.5 ML IM: CPT | Performed by: INTERNAL MEDICINE

## 2021-02-24 PROCEDURE — 90471 IMMUNIZATION ADMIN: CPT | Performed by: INTERNAL MEDICINE

## 2021-02-24 RX ORDER — LEVOTHYROXINE SODIUM 0.07 MG/1
TABLET ORAL
Qty: 90 TABLET | Refills: 1 | Status: SHIPPED | OUTPATIENT
Start: 2021-02-24 | End: 2021-12-07 | Stop reason: SDUPTHER

## 2021-02-24 RX ORDER — BUPROPION HYDROCHLORIDE 150 MG/1
150 TABLET ORAL EVERY MORNING
Qty: 30 TABLET | Refills: 5 | Status: SHIPPED | OUTPATIENT
Start: 2021-02-24 | End: 2022-01-05

## 2021-02-24 RX ORDER — AMLODIPINE BESYLATE 10 MG/1
10 TABLET ORAL DAILY
Qty: 90 TABLET | Refills: 1 | Status: SHIPPED | OUTPATIENT
Start: 2021-02-24 | End: 2021-12-07 | Stop reason: SDUPTHER

## 2021-02-24 NOTE — PROGRESS NOTES
2021  Faith Kenyon (:  1960)       ASSESSMENT/PLAN:   1. Essential hypertension  Assessment & Plan:  Good control. Continue amlodipine 10 mg.  2. Acquired hypothyroidism  Assessment & Plan:  Stable. Continue levothyroxine 75 mcg. 3. Depression, unspecified depression type  Assessment & Plan:  More symptomatic due to nonadherence. Ready to get back on track with meds. 4. Hypercholesterolemia  Assessment & Plan:  Cholesterol still high. 10 year risk 4.3%. Lifestyle management at this time. 5. Sleep apnea, unspecified type  Assessment & Plan:  Awakens frequently, daytime hypersomnolence, has gained weight since last study and now has insurance that likely will covered. Refer to Dr. Ernestine Amanda. Orders:  -     Noris Stacy MD, Sleep Medicine, [facility]  6. Encounter for screening mammogram for malignant neoplasm of breast  -     GIANNA DIGITAL SCREEN W OR WO CAD BILATERAL; Future      Return in about 6 months (around 2021) for CPE/chronic, shingrix #2 in 2-6 mo. SUBJECTIVE/OBJECTIVE:  63 y.o. female established patient here for:   Chief Complaint   Patient presents with    Annual Exam     Under a lot of stress recently. Daughter, roney in high school, really struggling since the start of Covid. Having mental health issues and has been hospitalized. Not consistent with antidepressants but ready to get back onto them regularly. Feeling more stressed and depressed. Not suicidal.    Also ready to do sleep study. Falling asleep during the day. Awakens frequently. Thinks it is interfering with her life.     Review of Systems  Outpatient Medications Marked as Taking for the 21 encounter (Office Visit) with Beth Ackerman MD   Medication Sig Dispense Refill    buPROPion (WELLBUTRIN XL) 150 MG extended release tablet Take 1 tablet by mouth every morning 30 tablet 5    levothyroxine (SYNTHROID) 75 MCG tablet TAKE ONE TABLET BY MOUTH DAILY ON AN EMPTY STOMACH TAKE WITH

## 2021-09-24 ENCOUNTER — HOSPITAL ENCOUNTER (OUTPATIENT)
Dept: MAMMOGRAPHY | Age: 61
Discharge: HOME | End: 2021-09-24
Payer: COMMERCIAL

## 2021-09-24 VITALS — WEIGHT: 215 LBS | HEIGHT: 66 IN | BODY MASS INDEX: 34.55 KG/M2

## 2021-09-24 DIAGNOSIS — Z12.31 VISIT FOR SCREENING MAMMOGRAM: ICD-10-CM

## 2021-09-24 PROCEDURE — 77067 SCR MAMMO BI INCL CAD: CPT

## 2021-10-18 ENCOUNTER — PRE-PROCEDURE TELEPHONE (OUTPATIENT)
Dept: WOMENS IMAGING | Age: 61
End: 2021-10-18

## 2021-10-18 ENCOUNTER — TELEPHONE (OUTPATIENT)
Dept: WOMENS IMAGING | Age: 61
End: 2021-10-18

## 2021-10-18 ENCOUNTER — HOSPITAL ENCOUNTER (OUTPATIENT)
Dept: MAMMOGRAPHY | Age: 61
Discharge: HOME | End: 2021-10-18
Payer: COMMERCIAL

## 2021-10-18 DIAGNOSIS — R92.8 ABNORMAL MAMMOGRAM OF LEFT BREAST: Primary | ICD-10-CM

## 2021-10-18 DIAGNOSIS — R92.8 ABNORMAL FINDING ON MAMMOGRAPHY: ICD-10-CM

## 2021-10-18 PROCEDURE — 77065 DX MAMMO INCL CAD UNI: CPT

## 2021-10-18 NOTE — TELEPHONE ENCOUNTER
Left message for patient on VM requesting return call. Reaching out to schedule biopsy and perform pre-biopsy patient education. Started at [facility] but prefers [facility] for stereotactic recommendations.

## 2021-10-20 ENCOUNTER — OFFICE VISIT (OUTPATIENT)
Dept: PULMONOLOGY | Age: 61
End: 2021-10-20
Payer: COMMERCIAL

## 2021-10-20 VITALS
DIASTOLIC BLOOD PRESSURE: 68 MMHG | OXYGEN SATURATION: 98 % | HEIGHT: 66 IN | WEIGHT: 215 LBS | HEART RATE: 74 BPM | BODY MASS INDEX: 34.55 KG/M2 | SYSTOLIC BLOOD PRESSURE: 122 MMHG

## 2021-10-20 DIAGNOSIS — E66.9 OBESITY (BMI 30.0-34.9): Chronic | ICD-10-CM

## 2021-10-20 DIAGNOSIS — G47.33 OBSTRUCTIVE SLEEP APNEA (ADULT) (PEDIATRIC): Primary | ICD-10-CM

## 2021-10-20 DIAGNOSIS — F32.A DEPRESSION, UNSPECIFIED DEPRESSION TYPE: Chronic | ICD-10-CM

## 2021-10-20 DIAGNOSIS — I10 ESSENTIAL HYPERTENSION: Chronic | ICD-10-CM

## 2021-10-20 DIAGNOSIS — E03.9 ACQUIRED HYPOTHYROIDISM: Chronic | ICD-10-CM

## 2021-10-20 PROBLEM — R06.83 SNORING: Status: ACTIVE | Noted: 2021-10-20

## 2021-10-20 PROCEDURE — 99204 OFFICE O/P NEW MOD 45 MIN: CPT | Performed by: INTERNAL MEDICINE

## 2021-10-20 ASSESSMENT — SLEEP AND FATIGUE QUESTIONNAIRES
HOW LIKELY ARE YOU TO NOD OFF OR FALL ASLEEP WHILE SITTING QUIETLY AFTER LUNCH WITHOUT ALCOHOL: 2
HOW LIKELY ARE YOU TO NOD OFF OR FALL ASLEEP WHEN YOU ARE A PASSENGER IN A CAR FOR AN HOUR WITHOUT A BREAK: 2
HOW LIKELY ARE YOU TO NOD OFF OR FALL ASLEEP IN A CAR, WHILE STOPPED FOR A FEW MINUTES IN TRAFFIC: 1
HOW LIKELY ARE YOU TO NOD OFF OR FALL ASLEEP WHILE SITTING INACTIVE IN A PUBLIC PLACE: 2
ESS TOTAL SCORE: 17
HOW LIKELY ARE YOU TO NOD OFF OR FALL ASLEEP WHILE LYING DOWN TO REST IN THE AFTERNOON WHEN CIRCUMSTANCES PERMIT: 3
NECK CIRCUMFERENCE (INCHES): 16
HOW LIKELY ARE YOU TO NOD OFF OR FALL ASLEEP WHILE SITTING AND TALKING TO SOMEONE: 1
HOW LIKELY ARE YOU TO NOD OFF OR FALL ASLEEP WHILE SITTING AND READING: 3
HOW LIKELY ARE YOU TO NOD OFF OR FALL ASLEEP WHILE WATCHING TV: 3

## 2021-10-20 ASSESSMENT — ENCOUNTER SYMPTOMS
EYE PAIN: 0
ALLERGIC/IMMUNOLOGIC NEGATIVE: 1
APNEA: 1
ABDOMINAL DISTENTION: 0
NAUSEA: 0
ABDOMINAL PAIN: 0
CHEST TIGHTNESS: 0
RHINORRHEA: 0
PHOTOPHOBIA: 0
SHORTNESS OF BREATH: 0
VOMITING: 0
CHOKING: 0

## 2021-10-20 NOTE — LETTER
[facility] Sleep [facility]  [place] [id number] [place] [address]  Phone: [phone]  Fax: [phone]           Cathie Ohara MD      October 20, 2021     Patient: Zoey Shaw   MR Number: [id number]   YOB: 1960   Date of Visit: 10/20/2021       Dear Dr. Nicky Rodriguez: Thank you for referring Helene Brewster to me for evaluation/treatment. Below are the relevant portions of my assessment and plan of care. Visit Diagnoses and Associated Orders     Obstructive sleep apnea (adult) (pediatric)   (New Problem)  -  Primary    needs work-up    Home Sleep Study (HST) [20093 Custom]   - Future Order         Essential hypertension   (Stable)           Acquired hypothyroidism   (Stable)           Depression, unspecified depression type   (Stable)           Obesity (BMI 30.0-34.9)   (Not Stable)                 Since no old records are available will need repeat testing. One or more undiagnosed new problem with uncertain prognosis till final diagnosis is made. Differential diagnosis includes but not limited to: MAURA, PLMD's, narcolepsy, parasomnias. Reviewed MAURA (highest likelihood Dx): pathophysiology, diagnosis, complications and treatment. Instructed her not to drive if drowsy. Continue medications per her PCP and other physicians. Limit caffeine use after 3pm. Standard of care is to do in-lab PSG but insurance is mandating an inferior HST. 1 wk follow up after study to review her results. The chronic medical conditions listed are directly related to the primary diagnosis listed above. The management of the primary diagnosis affects the secondary diagnosis and vice versa. This information was analyzed to assess complexity and medical decision making in regards to further testing and management. Continue meds for: HTN, hypothyroid, and depression. Pt would medically benefit from wt loss for MAURA (diet, exercise, surgical).     Orders Placed This Encounter   Procedures    Home Sleep Study (HST)       If you have questions, please do not hesitate to call me. I look forward to following Selvin Lyon along with you.     Sincerely,        Anu Hurt MD    CC providers:  Carmelina Crocker MD  [address] [place] [id number]  Via In [facility]

## 2021-10-20 NOTE — PROGRESS NOTES
Arely Butler MD, Jaun Mckeon, [facility]  Tiffanie Kehrt CNP Phalisa Potter CNP [facility]  Keren Lozano [address] [facility], [address] [facility] [phone]   [facility] [address] Keren Lozano. [address] [facility]. Ros[address] Mc[address] [phone]     [place] [address]  Dept: [phone]  Loc: [phone]    Assessment:      Visit Diagnoses and Associated Orders     Obstructive sleep apnea (adult) (pediatric)   (New Problem)  -  Primary    needs work-up    Home Sleep Study (HST) [60321 Custom]   - Future Order         Essential hypertension   (Stable)           Acquired hypothyroidism   (Stable)           Depression, unspecified depression type   (Stable)           Obesity (BMI 30.0-34.9)   (Not Stable)                  Plan:      Since no old records are available will need repeat testing. One or more undiagnosed new problem with uncertain prognosis till final diagnosis is made. Differential diagnosis includes but not limited to: MAURA, PLMD's, narcolepsy, parasomnias. Reviewed MAURA (highest likelihood Dx): pathophysiology, diagnosis, complications and treatment. Instructed her not to drive if drowsy. Continue medications per her PCP and other physicians. Limit caffeine use after 3pm. Standard of care is to do in-lab PSG but insurance is mandating an inferior HST. 1 wk follow up after study to review her results. The chronic medical conditions listed are directly related to the primary diagnosis listed above. The management of the primary diagnosis affects the secondary diagnosis and vice versa. This information was analyzed to assess complexity and medical decision making in regards to further testing and management. Continue meds for: HTN, hypothyroid, and depression. Pt would medically benefit from wt loss for MAURA (diet, exercise, surgical).     Orders Placed This Encounter   Procedures    Home Sleep Study (HST)          Subjective:     Patient ID: Aaron Storm is a 57 y.o. female. Chief Complaint   Patient presents with    Sleep Apnea       HPI:      Aaron Storm is a 57 y.o. female referred by Regis Fernandez MD for a sleep evaluation. She complains of: snoring, excessive daytime sleepiness , non-restorative sleep, nocturia, AM mouth dryness, napping, drowsiness while driving (on long drives) and tossing and turning at night. She denies: cataplexy and hypnagogic hallucinations. Symptoms began several years ago, gradually worsening since that time. Previous evaluation and treatment has included- polysomnography done in , not able to afford treatment at that time. No old records. Chronic stable medical conditions: HTN, hypothyroid, and depression  Chronic unstable medical conditions: obesity    DOT/CDL - No  FAA/'s license -No    Previous Report(s) Reviewed: historical medical records, office notes, andreferral letter(s). Pertinent data has been documented. [place] - Total score: 17    Caffeine Intake - Pop/Soda: 2 can(s) per day and Hot tea: 2 cup(s) per day    Social History     Socioeconomic History    Marital status:      Spouse name: Not on file    Number of children: 1    Years of education: Not on file    Highest education level: Not on file   Occupational History    Occupation: company  patient assistance program for [employer]   Tobacco Use    Smoking status: Former Smoker     Packs/day: 0.50     Years: 3.00     Pack years: 1.50     Quit date: 1991     Years since quittin.9    Smokeless tobacco: Never Used   Vaping Use    Vaping Use: Never assessed   Substance and Sexual Activity    Alcohol use:  Yes     Alcohol/week: 0.8 - 1.7 standard drinks     Types: 1 - 2 Standard drinks or equivalent per week     Comment: 6/week    Drug use: No    Sexual activity: Yes     Partners: Male     Birth control/protection: Post-menopausal   Other Topics Concern    Not on file   Social History Narrative    Exercise:        Walks dog 2 miles 5-7/week            Diet:  Diet very poor currently with FT work and school. Social Determinants of Health     Financial Resource Strain:     Difficulty of Paying Living Expenses:    Food Insecurity:     Worried About Running Out of Food in the Last Year:     [address] in the Last Year:    Transportation Needs:     Lack of Transportation (Medical):  Lack of Transportation (Non-Medical):    Physical Activity:     Days of Exercise per Week:     Minutes of Exercise per Session:    Stress:     Feeling of Stress :    Social Connections:     Frequency of Communication with Friends and Family:     Frequency of Social Gatherings with Friends and Family:     Attends Nondenominational Services:     Active Member of Clubs or Organizations:     Attends Club or Organization Meetings:     Marital Status:    Intimate Partner Violence:     Fear of Current or Ex-Partner:     Emotionally Abused:     Physically Abused:     Sexually Abused:         Current Outpatient Medications   Medication Instructions    amLODIPine (NORVASC) 10 mg, Oral, DAILY    buPROPion (WELLBUTRIN XL) 150 mg, Oral, EVERY MORNING    levothyroxine (SYNTHROID) 75 MCG tablet TAKE ONE TABLET BY MOUTH DAILY ON AN EMPTY STOMACH TAKE WITH WATER AND WAIT 30 MINUTES BEFORE EATING OR TAKING OTHER MEDS,    sertraline (ZOLOFT) 50 mg, Oral, DAILY       Allergies as of 10/20/2021    (No Known Allergies)       Patient Active Problem List   Diagnosis    Fatigue    Migraines    Arthritis    Acquired hypothyroidism    Vitamin D deficiency    Abnormal finding on EKG    History of DVT (deep vein thrombosis)    Obesity (BMI 30.0-34. 9)    Essential hypertension    S/P total knee replacement, left    Insomnia    Obstructive sleep apnea syndrome    Depression    Hypercholesterolemia       Past Medical History:   Diagnosis Date    Abnormal finding on EKG 3/21/2013    Normal stress myoview 3/21/13     Arthritis     widespread, former ballerina    Degenerative disc disease, cervical     cervical  disc fusion C4-C7; Dr. Leo Musa    History of depression     History of DVT (deep vein thrombosis) 1991    s/p arthroscopy, left    Migraines     Parotid mass 12/4/2017    S/p excision 2/2018. Pleomorphic adenoma. Dr. Wilde Favor Sleep apnea     no cpap. MAR[facility]IE M[facility]    Thyroid disease        Past Surgical History:   Procedure Laterality Date    CERVICAL FUSION  2005    C4-7, decompressive anterior discectomies and foraminotomiesC4-7;Bohinski    HIP ARTHROPLASTY Right [id number]    RIGHT TOTAL HIP ARTHROPLASTY ANTERIOR APPROACH    HIP ARTHROPLASTY Left 06/2017    JOINT REPLACEMENT Left 06/2017    Dr. Khanh Rosario ARTHROSCOPY      bilateral, multiple    NASAL SEPTUM SURGERY      PLANTAR FASCIA SURGERY  2006    left; Dr. Ravi Giang      left superficial parotidectomy, pleomorphic adenoma    TOTAL KNEE ARTHROPLASTY  2009    left; Dr. Dre Lopez Right 03/22/12    Dr. Candida Mendoza       Family History   Problem Relation Age of Onset    Hypertension Father     Cancer Father         fibrohistiosarcoma    Heart Attack Mother         68    Heart Failure Mother     Breast Cancer Mother     Atrial Fibrillation Sister     Hypertension Sister     Breast Cancer Sister     BRCA 2 Negative Sister     BRCA 1 Negative Sister     Sleep Apnea Sister        Review of Systems   Constitutional: Positive for fatigue and unexpected weight change. Negative for activity change and appetite change. HENT: Positive for congestion. Negative for nosebleeds, postnasal drip, rhinorrhea and sneezing. Eyes: Negative for photophobia, pain and visual disturbance. Respiratory: Positive for apnea. Negative for choking, chest tightness and shortness of breath. Cardiovascular: Negative.     Gastrointestinal: Negative for abdominal distention, abdominal pain, nausea and vomiting. Endocrine: Negative for cold intolerance and heat intolerance. Genitourinary: Positive for frequency. Negative for difficulty urinating, dysuria and urgency. Musculoskeletal: Negative. Negative for neck pain and neck stiffness. Skin: Negative. Allergic/Immunologic: Negative. Neurological: Negative for tremors, seizures, syncope and weakness. Hematological: Negative for adenopathy. Does not bruise/bleed easily. Psychiatric/Behavioral: Positive for sleep disturbance. Negative for agitation, behavioral problems and confusion. Objective:     Vitals:  Weight BMI   Wt Readings from Last 3 Encounters:   10/20/21 215 lb (97.5 kg)   09/24/21 215 lb (97.5 kg)   02/24/21 213 lb 12.8 oz (97 kg)    Body mass index is 34.7 kg/m². BP HR SaO2   BP Readings from Last 3 Encounters:   10/20/21 122/68   02/24/21 112/70   08/20/19 134/82    Pulse Readings from Last 3 Encounters:   10/20/21 74   02/24/21 96   08/20/19 76    SpO2 Readings from Last 3 Encounters:   10/20/21 98%   08/20/19 98%   06/11/19 98%        Physical Exam  Vitals reviewed. Constitutional:       General: She is not in acute distress. Appearance: Normal appearance. She is well-developed. She is not toxic-appearing or diaphoretic. HENT:      Head: Normocephalic and atraumatic. Not macrocephalic and not microcephalic. Right Ear: External ear normal.      Left Ear: External ear normal.      Nose: Septal deviation and mucosal edema present. No nasal deformity. Mouth/Throat:      Lips: Pink. Mouth: Mucous membranes are moist.      Tongue: No lesions. Palate: No mass. Pharynx: Uvula midline. No oropharyngeal exudate or uvula swelling. Tonsils: No tonsillar exudate or tonsillar abscesses. Comments: Tonsils: normal size  Eyes:      General: Lids are normal.      Extraocular Movements: Extraocular movements intact.       Conjunctiva/sclera: Conjunctivae normal.      Pupils: Pupils are equal, round, and reactive to light. Neck:      Vascular: No JVD. Trachea: Trachea normal.      Comments: Neck Circ: 16 inches    Cardiovascular:      Rate and Rhythm: Normal rate and regular rhythm. Heart sounds: Normal heart sounds. Pulmonary:      Effort: Pulmonary effort is normal.      Breath sounds: Normal breath sounds. Abdominal:      General: Bowel sounds are normal.   Musculoskeletal:      Cervical back: Normal range of motion. Comments: No evidence of cyanosis or clubbing of nails   Skin:     General: Skin is warm. Nails: There is no clubbing. Neurological:      General: No focal deficit present. Mental Status: She is alert. Psychiatric:         Attention and Perception: Attention normal.         Mood and Affect: Mood and affect normal.         Speech: Speech normal.         Behavior: Behavior normal. Behavior is cooperative. Thought Content:  Thought content normal.         Electronically signed by Vanesa Lunsford MD on10/20/2021 at 2:27 PM

## 2021-10-22 ENCOUNTER — TELEPHONE (OUTPATIENT)
Dept: SLEEP CENTER | Age: 61
End: 2021-10-22

## 2021-10-25 ENCOUNTER — HOSPITAL ENCOUNTER (OUTPATIENT)
Dept: WOMENS IMAGING | Age: 61
Discharge: HOME | End: 2021-10-25
Payer: COMMERCIAL

## 2021-10-25 DIAGNOSIS — R92.8 ABNORMAL MAMMOGRAM OF LEFT BREAST: ICD-10-CM

## 2021-10-25 DIAGNOSIS — R92.8 ABNORMAL MAMMOGRAM: ICD-10-CM

## 2021-10-25 PROCEDURE — A4648 IMPLANTABLE TISSUE MARKER: HCPCS

## 2021-10-25 PROCEDURE — 2500000003 HC RX 250 WO HCPCS: Performed by: RADIOLOGY

## 2021-10-25 PROCEDURE — 88305 TISSUE EXAM BY PATHOLOGIST: CPT

## 2021-10-25 PROCEDURE — 88342 IMHCHEM/IMCYTCHM 1ST ANTB: CPT

## 2021-10-25 PROCEDURE — 76098 X-RAY EXAM SURGICAL SPECIMEN: CPT

## 2021-10-25 PROCEDURE — 77065 DX MAMMO INCL CAD UNI: CPT

## 2021-10-25 RX ORDER — LIDOCAINE HYDROCHLORIDE AND EPINEPHRINE 10; 10 MG/ML; UG/ML
20 INJECTION, SOLUTION INFILTRATION; PERINEURAL ONCE
Status: COMPLETED | OUTPATIENT
Start: 2021-10-25 | End: 2021-10-25

## 2021-10-25 RX ORDER — LIDOCAINE HYDROCHLORIDE 10 MG/ML
5 INJECTION, SOLUTION EPIDURAL; INFILTRATION; INTRACAUDAL; PERINEURAL ONCE
Status: COMPLETED | OUTPATIENT
Start: 2021-10-25 | End: 2021-10-25

## 2021-10-25 RX ADMIN — LIDOCAINE HYDROCHLORIDE,EPINEPHRINE BITARTRATE 17 ML: 10; .01 INJECTION, SOLUTION INFILTRATION; PERINEURAL at 12:30

## 2021-10-25 RX ADMIN — LIDOCAINE HYDROCHLORIDE 5 ML: 10 INJECTION, SOLUTION EPIDURAL; INFILTRATION; INTRACAUDAL; PERINEURAL at 12:29

## 2021-10-25 ASSESSMENT — PAIN SCALES - GENERAL: PAINLEVEL_OUTOF10: 0

## 2021-10-25 NOTE — PROGRESS NOTES
Patient here for breast biopsy. NN reviewed the health history, allergies and medication. Nothing to hold. Patient drove herself. Radiologist reviews procedure with patient, consent signed. Patient tolerates procedure well. Compression held. Site cleansed with chloraprep, steri strips and dry dressing applied. Ice pack in place. Reviewed discharge instructions with patient and signed copy. Patient verbalized understanding and agreed to contact Nurse Navigator with any questions. Patient A&Ox3, steady on feet and discharged home.

## 2021-10-26 ENCOUNTER — FOLLOWUP TELEPHONE ENCOUNTER (OUTPATIENT)
Dept: WOMENS IMAGING | Age: 61
End: 2021-10-26

## 2021-10-26 NOTE — TELEPHONE ENCOUNTER
Nurse Navigator reviewed results of breast biopsy which showed ADH on the pathology report. NN reviewed radiologist follow up results as concordant and also because of this atypia,  Is recommending a stereotactic biopsy of the nearby calcifications. Magui Rodríguez was very upset with the news and the recommendations for another biopsy. Stated it was \"torture\" and to just remove the additional site, even the whole breast if needed at the time of surgery. She was adamant at this time - very irate but was able to state she wasn't angry at NN, just \"wants to live to her daughters wedding\". NN we talk at another time when she can process more information and talk about next steps. She has NN contact information and said she would call back on Thursday.

## 2021-10-28 ENCOUNTER — FOLLOWUP TELEPHONE ENCOUNTER (OUTPATIENT)
Dept: WOMENS IMAGING | Age: 61
End: 2021-10-28

## 2021-10-28 NOTE — TELEPHONE ENCOUNTER
Nurse Navigator returned call from patient to discuss again her results of ADH from her stereotactic bx and recommendations for a second stereotactic bx in a nearby area of [facility]. She again was adamant that she didn't want to be \"tortured\" with another biopsy and wanted to proceed with talking with a breast surgeon first to get more information. NN explained that even if it was determined to remove both areas with a lumpectomy, a surgical TAG would need to be placed ahead of surgery in order to have a \"target\" for the surgeon. This too was upsetting to the patient who feels like \"mammograms are torture\" and frustrated with any more procedures. Asking questions about whether insurance would cover a mastectomy or not stating she is not attached to her breasts \" uncomfortable bags of fat\". Her mom was diagnosed in her 80's and her sister recently, postmenopausal and tested negative for genetics according to [facility]. Both live [place] of [place]. This began with a baseline mammogram.  to Nick Lala, I daughter Janette Larry who's a senior in [employer]. Currently works for [employer] and needs 2 weeks notice to get a day off. [address] [facility] [address] - she started at [facility] and came here for stereo with chair due to neck/knee/hip issues. NN plan to check for availability at both  and [facility] and f/u with Stephanie.

## 2021-10-29 ENCOUNTER — TELEPHONE (OUTPATIENT)
Dept: WOMENS IMAGING | Age: 61
End: 2021-10-29

## 2021-10-29 NOTE — TELEPHONE ENCOUNTER
Left message for patient on VM with an update on her request for breast surgical consult and that we may not be able to get back to her until Tuesday with an appt option for Dr Cinda Pride as she was off today and has a full clinic on Monday. Given NN contact information.

## 2021-11-02 ENCOUNTER — TELEPHONE (OUTPATIENT)
Dept: SURGERY | Age: 61
End: 2021-11-02

## 2021-11-02 NOTE — TELEPHONE ENCOUNTER
Called patient to schedule a consult appointment with Shant England per referral from our Nurse Navigators Melissa Ortega). I left the patient a voicemail to call back and schedule. Our next available appointment as of right now is 12/2/21 @2p.m.

## 2021-11-23 ENCOUNTER — TELEPHONE (OUTPATIENT)
Dept: SURGERY | Age: 61
End: 2021-11-23

## 2021-11-29 NOTE — PROGRESS NOTES
CC: Left breast atypical ductal hyperplasia  At high risk of breast cancer  Abnormal breast imaging    HPI: Ms. Saba Alberto is a 57 y.o. woman who presents today with new left breast atypical ductal hyperplasia. She has not noticed any new abnormalities such as masses, skin changes, color changes,nipple discharge, or changes to the nipple-areolar complex. She has never required a breast biopsy. She has a family history of breast cancer. She presents to the surgical office today in initial consultation to discuss her recent breast concerns. [facility] INTERVAL HISTORY:  On 2021 she underwent screening mammography. There are new groupings of calcifications in the upper outer left breast for which further imaging is warranted. On 10/18/2021 she underwent diagnostic left breast imaging. There are multiple groups of large punctate calcifications. However there is a grouping showing more pleomorphism. This indeterminate group of calcifications is in the 2 to 3 o'clock position. Biopsy is recommended. BI-RADS 4. On 10/25/2021 she underwent core needle biopsy of the left breast.  Pathology identified atypical ductal hyperplasia with no sign of malignancy. [id number] similar in additional group of calcifications located medial to the biopsy group is noted now recommended for additional stereotactic biopsy. BI-RADS 4.       PATHOLOGY:  Department of Pathology   FINAL SURGICAL PATHOLOGY REPORT   Patient Name: Pauline Melgar          Accession No:  [id number]    Age Sex:   1960    61 Y / F       Location:     [place] [address]   Account No:   [de-identified]                  Collected:     10/25/2021   Med Rec No:    [id number]                 Received:      10/25/2021   Attend Phys:   Rey Su MD            Completed:     10/26/2021   Perform Phys: Harshad Ackerman MD             FINAL DIAGNOSIS:     Left breast at 2-3 o'clock 7 cm from nipple, biopsy:      - Atypical Allergies as of 2021    (No Known Allergies)       Social History     Tobacco Use    Smoking status: Former Smoker     Packs/day: 0.50     Years: 3.00     Pack years: 1.50     Quit date: 1991     Years since quittin.0    Smokeless tobacco: Never Used   Vaping Use    Vaping Use: Not on file   Substance Use Topics    Alcohol use: Yes     Alcohol/week: 0.8 - 1.7 standard drinks     Types: 1 - 2 Standard drinks or equivalent per week     Comment: 6/week    Drug use: No         Family History:  No additional family history of breast or ovarian cancer. Please reference the intake form for additional family history. Hormonal History:   a.0  m.1  Menarche at age 14. First live birth at age 38. did not breastfeed. Postmenopausal at age 38  Hysterectomy: no hysterectomy  Denies estrogen supplementation  OCP taken for 10 years in the past but not currently    Medications:  Medication documentation has been reviewed in the electronic medical record and patient office intake form. Exam:  There were no vitals taken for this visit. Physical Exam      Constitutional: She appears well-nourished. No apparent distress. Breast: The patient has directly and fully declined all aspects of a clinical breast exam at this time   Head: Normocephalic andatraumatic. Eyes: EOM are normal. Pupils are equal, round, and reactive to light. Neck: Neck supple. No tracheal deviation present. Cardiovascular: regular rate. Extremities appear well perfused. Pulmonary: respirationsare non-labored and without audible distress  Lymphatics: no palpable axillary or cervical lymphadenopathy. Skin: No rash noted. No erythema. Neurologic: alert and oriented. Assessment/Plan: Ms. Jose Raul Oro is a 57y.o. year-old woman with new left breast atypical ductal hyperplasia  Additional abnormal left breast calcifications  At high risk of breast cancer    Ms. Kenyon was very clear that she strongly disliked most of the elements of work-up for her breast disease. This mainly involved elements of manipulating the breast itself as well as the technique to perform a mammogram by pulling the breast well into the mammogram plates as well as needling the breast for biopsies and for potential localization procedures in the future. We spent a great amount of time discussing the management process and rationale behind each of the supporting steps. She is generally unhappy with her breast as a lead to a lot of symptoms of pain and it seems her goal would be a reduction. However we discussed risk reduction surgery as well based on her risk assessments if no malignancy was identified on upcoming excisional biopsy. She had questions regarding other modalities for evaluating the breast such as breast MRI. I also reviewed the false positive rates for breast MRI that could lead to a biopsy which ultimately she dislikes strongly. However we talked about her level of risk and the typical recommendations if no malignancy is present. We reviewed options broadly for screening and risk reduction. I reviewed with Ms. Kenyon her most recent breast imaging and biopsy results. She has verbally declined a physical exam.  We discussed the additional group of calcifications adjacent to the biopsied region. Additional stereotactic biopsy is recommended. She verbalized understanding and agreement to proceed with preference to be to have a surgical marker placed at this time. She understands if benign findings are identified the marker will still need to be recovered. She also understands that if this area was targeted without biopsy a surgical marker would need to be placed as well. We discussed the pathophysiology of her biopsy. We discussed the relative risk of a future cancer in comparison to the general population.  Due to the 10-15% upgrade risk of invasive cancer or noninvasive cancer in the area surrounding her biopsy cavity, we recommended excisional breast biopsy in the operating room. We discussed the technique of localization. This will be done by targeting the lesion, near which the clip was placed post biopsy. This is performed using either mammographic or ultrasound guidance. We would then proceed to the operating room to remove the abnormal area within the breast. We discussed the risks and benefits of surgery which include but are not limited to bleeding, infection, wound complications, unappealing cosmetics, and the need to return to the operating room for a second procedure based on final pathology. Assessment and radiology department determined her lifetime risk of breast cancer to be 26.6%. This was repeated in the office with an evaluation showing lifetime risk of breast cancer at 31.5% (population risk 8.4%). As above we discussed this in detail. We reviewed the influenza family and personal history in great detail. At this time: We will plan for excisional left breast biopsy  We will request surgical marker placement at the time of her upcoming left breast biopsy. She understands if the biopsy is benign I will still need to target the RFID tag for removal.      All of the patient's questions were answered at this time however, she was encouraged to call the office with any further inquiries. Approximately 50 minutes of time were spent in preparation, direct patient contact, record review, imaging interpretation, care coordination, documentation and activities otherwise related to this encounter.

## 2021-12-02 ENCOUNTER — OFFICE VISIT (OUTPATIENT)
Dept: SURGERY | Age: 61
End: 2021-12-02
Payer: COMMERCIAL

## 2021-12-02 VITALS
RESPIRATION RATE: 18 BRPM | BODY MASS INDEX: 34.72 KG/M2 | DIASTOLIC BLOOD PRESSURE: 90 MMHG | OXYGEN SATURATION: 98 % | HEART RATE: 74 BPM | HEIGHT: 66 IN | WEIGHT: 216 LBS | SYSTOLIC BLOOD PRESSURE: 156 MMHG

## 2021-12-02 DIAGNOSIS — N60.92 ATYPICAL DUCTAL HYPERPLASIA OF LEFT BREAST: Primary | ICD-10-CM

## 2021-12-02 DIAGNOSIS — R92.8 ABNORMAL FINDING ON BREAST IMAGING: ICD-10-CM

## 2021-12-02 PROCEDURE — 99204 OFFICE O/P NEW MOD 45 MIN: CPT | Performed by: SURGERY

## 2021-12-02 RX ORDER — SODIUM CHLORIDE 9 MG/ML
25 INJECTION, SOLUTION INTRAVENOUS PRN
Status: CANCELLED | OUTPATIENT
Start: 2021-12-02

## 2021-12-02 RX ORDER — SODIUM CHLORIDE 0.9 % (FLUSH) 0.9 %
5-40 SYRINGE (ML) INJECTION EVERY 12 HOURS SCHEDULED
Status: CANCELLED | OUTPATIENT
Start: 2021-12-02

## 2021-12-02 RX ORDER — SODIUM CHLORIDE, SODIUM LACTATE, POTASSIUM CHLORIDE, CALCIUM CHLORIDE 600; 310; 30; 20 MG/100ML; MG/100ML; MG/100ML; MG/100ML
INJECTION, SOLUTION INTRAVENOUS CONTINUOUS
Status: CANCELLED | OUTPATIENT
Start: 2021-12-02

## 2021-12-02 RX ORDER — SODIUM CHLORIDE 0.9 % (FLUSH) 0.9 %
5-40 SYRINGE (ML) INJECTION PRN
Status: CANCELLED | OUTPATIENT
Start: 2021-12-02

## 2021-12-02 ASSESSMENT — ENCOUNTER SYMPTOMS
ABDOMINAL PAIN: 1
VOMITING: 1
NAUSEA: 1
EYES NEGATIVE: 1
BACK PAIN: 1
RESPIRATORY NEGATIVE: 1
DIARRHEA: 1

## 2021-12-03 ENCOUNTER — TELEPHONE (OUTPATIENT)
Dept: WOMENS IMAGING | Age: 61
End: 2021-12-03

## 2021-12-03 DIAGNOSIS — R92.8 ABNORMAL FINDING ON BREAST IMAGING: ICD-10-CM

## 2021-12-03 DIAGNOSIS — N60.92 ATYPICAL DUCTAL HYPERPLASIA OF LEFT BREAST: Primary | ICD-10-CM

## 2021-12-03 NOTE — TELEPHONE ENCOUNTER
Left message for patient on VM requesting return call. Reaching out to schedule biopsy and RFID Tag placement and perform pre-biopsy patient education.

## 2021-12-06 ENCOUNTER — TELEPHONE (OUTPATIENT)
Dept: WOMENS IMAGING | Age: 61
End: 2021-12-06

## 2021-12-06 NOTE — TELEPHONE ENCOUNTER
Imaging Navigator reviewed pre-procedure stereo breast biopsy and RFID Tag placement patient education information in person and gave verbal instructions. Reviewed medications and need to hold all blood thinners per instructions. Patient should take all other medications as prescribed. Patient to eat and drink as normal prior to the procedure. Wear a bra with good support and a two piece outfit for comfort. Patient can bring someone with you but you can also drive yourself. Plan on being at the breast center for 2-2 and a half hours. Reviewed the process of a biopsy - sitting in a chair with your breast compressed similar to a mammogram with frequent images taken throughout the procedure. The skin is cleaned and a local anesthetic is given to numb the area. A small skin nick is made for the biopsy needle and once in place, samples are taken and then xrayed for confirmation. A tiny tissue marker is then placed inside your breast at the site of the biopsy for future reference. Then pressure is hold on the biopsy site to stop the bleeding and a bandage and waterproof dressing is applied. A mammogram is then done to validate the tissue marker. The tissue sample is sent to pathology. Results will come back in 2-3 business days and sent to your referring physician. Either they or the nurse navigator will call you with the results and recommended  follow up needed  Patient verbalizes understanding.

## 2021-12-07 ENCOUNTER — TELEPHONE (OUTPATIENT)
Dept: INTERNAL MEDICINE CLINIC | Age: 61
End: 2021-12-07

## 2021-12-07 RX ORDER — LEVOTHYROXINE SODIUM 0.07 MG/1
TABLET ORAL
Qty: 90 TABLET | Refills: 0 | Status: SHIPPED | OUTPATIENT
Start: 2021-12-07 | End: 2022-11-02 | Stop reason: SDUPTHER

## 2021-12-07 RX ORDER — AMLODIPINE BESYLATE 10 MG/1
10 TABLET ORAL DAILY
Qty: 90 TABLET | Refills: 0 | Status: SHIPPED | OUTPATIENT
Start: 2021-12-07 | End: 2022-11-02 | Stop reason: SDUPTHER

## 2021-12-07 NOTE — TELEPHONE ENCOUNTER
----- Message from Rosette Wolff sent at 12/7/2021 10:10 AM EST -----  Subject: Refill Request    QUESTIONS  Name of Medication? amLODIPine (NORVASC) 10 MG tablet  Patient-reported dosage and instructions? 10 mg   How many days do you have left? 0  Preferred Pharmacy? [facility] phone number (if available)? [phone]  Additional Information for Provider? Patient states she will not count her   pills to tell me how many days she have of each when asking.   ---------------------------------------------------------------------------  --------------,  Name of Medication? sertraline (ZOLOFT) 50 MG tablet  Patient-reported dosage and instructions? 50 mg   How many days do you have left? 0  Preferred Pharmacy? [facility] phone number (if available)? [phone]  Additional Information for Provider? Patient states she will not count her   pills to tell me how many days she have of each when asking.  ---------------------------------------------------------------------------  --------------,  Name of Medication? levothyroxine (SYNTHROID) 75 MCG tablet  Patient-reported dosage and instructions? 75 mg   How many days do you have left? 0  Preferred Pharmacy? [facility] phone number (if available)? [phone]  Additional Information for Provider? Patient states she will not count her   pills to tell me how many days she have of each when asking.  ---------------------------------------------------------------------------  --------------  CALL BACK INFO  What is the best way for the office to contact you? OK to leave message on   voicemail  Preferred Call Back Phone Number?  [phone]

## 2021-12-07 NOTE — TELEPHONE ENCOUNTER
----- Message from Yessenia Frankel sent at 12/7/2021 10:13 AM EST -----  Subject: Message to Provider    QUESTIONS  Information for Provider? Patient wants her labs updated will be coming in   prior to pre op. Patient states she was not given labs for pre op just   want them updated to what the doctor believes she needs. ---------------------------------------------------------------------------  --------------  Mary CLARK  What is the best way for the office to contact you? OK to leave message on   voicemail  Preferred Call Back Phone Number? [phone]  ---------------------------------------------------------------------------  --------------  SCRIPT ANSWERS  Relationship to Patient?  Self

## 2022-01-05 ENCOUNTER — OFFICE VISIT (OUTPATIENT)
Dept: INTERNAL MEDICINE CLINIC | Age: 62
End: 2022-01-05
Payer: COMMERCIAL

## 2022-01-05 VITALS
WEIGHT: 212 LBS | DIASTOLIC BLOOD PRESSURE: 78 MMHG | HEART RATE: 93 BPM | OXYGEN SATURATION: 98 % | BODY MASS INDEX: 34.07 KG/M2 | RESPIRATION RATE: 20 BRPM | SYSTOLIC BLOOD PRESSURE: 132 MMHG | HEIGHT: 66 IN

## 2022-01-05 DIAGNOSIS — I10 ESSENTIAL HYPERTENSION: ICD-10-CM

## 2022-01-05 DIAGNOSIS — N60.92 ATYPICAL DUCTAL HYPERPLASIA OF LEFT BREAST: ICD-10-CM

## 2022-01-05 DIAGNOSIS — E03.9 ACQUIRED HYPOTHYROIDISM: ICD-10-CM

## 2022-01-05 DIAGNOSIS — Z01.818 PRE-OP EVALUATION: Primary | ICD-10-CM

## 2022-01-05 PROCEDURE — 93000 ELECTROCARDIOGRAM COMPLETE: CPT | Performed by: INTERNAL MEDICINE

## 2022-01-05 PROCEDURE — 99214 OFFICE O/P EST MOD 30 MIN: CPT | Performed by: INTERNAL MEDICINE

## 2022-01-05 SDOH — ECONOMIC STABILITY: FOOD INSECURITY: WITHIN THE PAST 12 MONTHS, YOU WORRIED THAT YOUR FOOD WOULD RUN OUT BEFORE YOU GOT MONEY TO BUY MORE.: NEVER TRUE

## 2022-01-05 SDOH — ECONOMIC STABILITY: FOOD INSECURITY: WITHIN THE PAST 12 MONTHS, THE FOOD YOU BOUGHT JUST DIDN'T LAST AND YOU DIDN'T HAVE MONEY TO GET MORE.: NEVER TRUE

## 2022-01-05 ASSESSMENT — PATIENT HEALTH QUESTIONNAIRE - PHQ9
SUM OF ALL RESPONSES TO PHQ9 QUESTIONS 1 & 2: 0
2. FEELING DOWN, DEPRESSED OR HOPELESS: 0
SUM OF ALL RESPONSES TO PHQ QUESTIONS 1-9: 0
1. LITTLE INTEREST OR PLEASURE IN DOING THINGS: 0
SUM OF ALL RESPONSES TO PHQ QUESTIONS 1-9: 0

## 2022-01-05 ASSESSMENT — ENCOUNTER SYMPTOMS
COUGH: 0
SHORTNESS OF BREATH: 0

## 2022-01-05 ASSESSMENT — SOCIAL DETERMINANTS OF HEALTH (SDOH): HOW HARD IS IT FOR YOU TO PAY FOR THE VERY BASICS LIKE FOOD, HOUSING, MEDICAL CARE, AND HEATING?: SOMEWHAT HARD

## 2022-01-05 NOTE — PROGRESS NOTES
[facility]- Internal Medicine  Preoperative Consultation      2022    Janette Ying  :  1960    Chief Complaint   Patient presents with    Pre-op Exam     22 removing tissue from breast by Dr. Vicky Abraham at [facility]       HPI:  This patient presents to the office today for a preoperative consultation at the request of Dr. Vicky Abraham to assess stability of patient's medical condition(s) listed below. She will be having left excisional biopsy. Atypical ductal hyperplasia. On biopsy. Review of Systems   Constitutional: Negative for chills and fever. Respiratory: Negative for cough and shortness of breath. Cardiovascular: Negative for chest pain and palpitations. Skin: Negative for rash. Hematological: Does not bruise/bleed easily. Known anesthesia problems: nausea, post anesthesia, but no allergic reaction. Bleeding risk: No recent or remote history of abnormal bleeding  Personal or FH of DVT/PE: Yes - s/p knee arthroscopy    Recent steroid use: no  Exercise tolerance: >4 METS    Past Medical History:   Diagnosis Date    Abnormal finding on EKG 3/21/2013    Normal stress myoview 3/21/13     Arthritis     widespread, former ballerina    Degenerative disc disease, cervical     cervical  disc fusion C4-C7; Dr. Isrrael Reyna    History of depression     History of DVT (deep vein thrombosis)     s/p arthroscopy, left    Migraines     Parotid mass 2017    S/p excision 2018. Pleomorphic adenoma. Dr. Analia Landrum Sleep apnea     no cpap.  [facility]    Thyroid disease      Past Surgical History:   Procedure Laterality Date    CERVICAL FUSION      C4-7, decompressive anterior discectomies and foraminotomiesC4-7;Alexinski    HIP ARTHROPLASTY Right [id number]    RIGHT TOTAL HIP ARTHROPLASTY ANTERIOR APPROACH    HIP ARTHROPLASTY Left 2017    JOINT REPLACEMENT Left 2017    Dr. Maikel Frederick ARTHROSCOPY      bilateral, multiple    GIANNA STEROTACTIC LOC BREAST BIOPSY LEFT Left 10/25/2021    GIANNA STEROTACTIC LOC BREAST BIOPSY LEFT 10/25/2021 [facility] [phone]    NASAL SEPTUM SURGERY      PLANTAR FASCIA SURGERY  2006    left; Dr. Mellisa Street      left superficial parotidectomy, pleomorphic adenoma    TOTAL KNEE ARTHROPLASTY  2009    left; Dr. Sharda Tapia Right 12    Dr. Saeed Bob     Family History   Problem Relation Age of Onset    Heart Attack Mother         68    Heart Failure Mother    [address] Mother     Hypertension Father     Cancer Father         fibrohistiosarcoma    Deep Vein Thrombosis Father     Atrial Fibrillation Sister     Hypertension Sister     Breast Cancer Sister     BRCA 2 Negative Sister     BRCA 1 Negative Sister     Sleep Apnea Sister     Deep Vein Thrombosis Sister     Breast Cancer Maternal Grandmother      Social History     Tobacco Use    Smoking status: Former Smoker     Packs/day: 0.50     Years: 3.00     Pack years: 1.50     Quit date: 1991     Years since quittin.1    Smokeless tobacco: Never Used   Vaping Use    Vaping Use: Not on file   Substance Use Topics    Alcohol use: Yes     Alcohol/week: 0.8 - 1.7 standard drinks     Types: 1 - 2 Standard drinks or equivalent per week     Comment: 6/week    Drug use: No     No Known Allergies    Outpatient Medications Marked as Taking for the 22 encounter (Office Visit) with Salomón Desouza MD   Medication Sig Dispense Refill    levothyroxine (SYNTHROID) 75 MCG tablet TAKE ONE TABLET BY MOUTH DAILY ON AN EMPTY STOMACH TAKE WITH WATER AND WAIT 30 MINUTES BEFORE EATING OR TAKING OTHER MEDS, 90 tablet 0    amLODIPine (NORVASC) 10 MG tablet Take 1 tablet by mouth daily 90 tablet 0       Physical Exam:  /78   Pulse 93   Resp 20   Ht 5' 6\" (1.676 m)   Wt 212 lb (96.2 kg)   SpO2 98%   BMI 34.22 kg/m²   Physical Exam  Constitutional:       General: She is not in acute distress.   Eyes:      Pupils: Pupils are equal, round, and reactive to light. Neck:      Vascular: No carotid bruit. Cardiovascular:      Rate and Rhythm: Normal rate and regular rhythm. Heart sounds: No murmur heard. Pulmonary:      Effort: Pulmonary effort is normal.      Breath sounds: Normal breath sounds. Abdominal:      General: Bowel sounds are normal.      Palpations: Abdomen is soft. Tenderness: There is no abdominal tenderness. Musculoskeletal:      Right lower leg: No edema. Left lower leg: No edema. Skin:     Findings: No rash. Neurological:      General: No focal deficit present. Mental Status: She is alert and oriented to person, place, and time. EKG Interpretation:  normal sinus rhythm, poor R wave progression. Unchanged from prior EKGs. Lab Review: Renal and TSH to be drawn today     ASSESSMENT/PLAN:   Pre-op evaluation  -     EKG 12 Lead  Atypical ductal hyperplasia of left breast  Comments:  Medically stable with no contraindication to the planned procedure. Essential hypertension  Assessment & Plan:   Well-controlled, Morning of surgery should take amlodipine with a sip of water    Acquired hypothyroidism  Assessment & Plan:   Asymptomatic, continue current medications pending work up below    Pt advised to avoid NSAID's, OTC vitamin supplements and fish oil 1 week prior to procedure. Zaira Navarrete MD    Return in about 6 months (around 7/5/2022).

## 2022-01-06 ENCOUNTER — TELEPHONE (OUTPATIENT)
Dept: SURGERY | Age: 62
End: 2022-01-06

## 2022-01-06 ENCOUNTER — HOSPITAL ENCOUNTER (OUTPATIENT)
Dept: WOMENS IMAGING | Age: 62
End: 2022-01-06
Payer: COMMERCIAL

## 2022-01-06 ENCOUNTER — HOSPITAL ENCOUNTER (OUTPATIENT)
Dept: WOMENS IMAGING | Age: 62
Discharge: HOME | End: 2022-01-06
Payer: COMMERCIAL

## 2022-01-06 DIAGNOSIS — R92.8 ABNORMAL FINDING ON BREAST IMAGING: ICD-10-CM

## 2022-01-06 DIAGNOSIS — N60.92 ATYPICAL DUCTAL HYPERPLASIA OF LEFT BREAST: ICD-10-CM

## 2022-01-06 PROCEDURE — 2500000003 HC RX 250 WO HCPCS: Performed by: SURGERY

## 2022-01-06 PROCEDURE — A4648 IMPLANTABLE TISSUE MARKER: HCPCS

## 2022-01-06 PROCEDURE — 19282 PERQ DEVICE BREAST EA IMAG: CPT

## 2022-01-06 RX ORDER — LIDOCAINE HYDROCHLORIDE AND EPINEPHRINE 10; 10 MG/ML; UG/ML
20 INJECTION, SOLUTION INFILTRATION; PERINEURAL ONCE
Status: COMPLETED | OUTPATIENT
Start: 2022-01-06 | End: 2022-01-06

## 2022-01-06 RX ADMIN — LIDOCAINE HYDROCHLORIDE,EPINEPHRINE BITARTRATE 18 ML: 10; .01 INJECTION, SOLUTION INFILTRATION; PERINEURAL at 13:07

## 2022-01-06 NOTE — PROGRESS NOTES
Patient here for breast RFID Tag placement, Dr. Sofía Jameson discussed with the patient the benefit of having a biopsy with a marker placement versus going ahead with the Tag placement. Patient states she prefers to have the two site Tag placement with the larger area of tissue removed during surgery instead of having to go through the biopsy. IN reviewed the health history, allergies and medications. Drove herself. Radiologist reviews procedure with patient, consent signed. Patient tolerates procedure well. Compression held. Site cleansed with chloraprep, steri strips and dry dressing applied. Ice pack in place. Reviewed discharge instructions with patient and signed copy. Patient verbalized understanding and agreed to contact Nurse Navigator with any questions. Patient A&Ox3, steady on feet and discharged home.

## 2022-01-06 NOTE — TELEPHONE ENCOUNTER
Spoke to radiologist regarding imaging and localization for surgery. Patient declines additional biopsy/clip of suspicious appearing calcifications. Less concerning calcifications also noted. Prefers excision of second area with TAG localization regardless of additional surgical extent. This has been discussed in office. Will proceed with two site left breast excision.     javp

## 2022-01-07 LAB — SARS-COV-2, PCR: NOT DETECTED

## 2022-01-07 NOTE — PROGRESS NOTES
Name_______________________________________Printed:____________________  Date and time of surgery__01/12//22____0815__________________Arrival Time:___0645__[facility]___________   1. The instructions given regarding when and if a patient needs to stop oral intake prior to surgery varies. Follow the specific instructions you were given                  _X__Nothing to eat or to drink after Midnight the night before.                   ____Carbo loading or ERAS instructions will be given to select patients-if you have been given those instructions -please do the following                           The evening before your surgery after dinner before midnight drink 40 ounces of gatorade. If you are diabetic use sugar free. The morning of surgery drink 40 ounces of water. This needs to be finished 3 hours prior to your surgery start time. 2. Take the following pills with a small sip of water on the morning of surgery____Amlodine______Synthroid_________________________________________                  Do not take blood pressure medications ending in pril or sartan the kenneth prior to surgery or the morning of surgery_   3. Aspirin, Ibuprofen, Advil, Naproxen, Vitamin E and other Anti-inflammatory products and supplements should be stopped for 5 -7days before surgery or as directed by your physician. 4. Check with your Doctor regarding stopping Plavix, Coumadin,Eliquis, Lovenox,Effient,Pradaxa,Xarelto, Fragmin or other blood thinners and follow their instructions. 5. Do not smoke, and do not drink any alcoholic beverages 24 hours prior to surgery. This includes NA Beer. Refrain from the usage of any recreational drugs. 6. You may brush your teeth and gargle the morning of surgery. DO NOT SWALLOW WATER   7. You MUST make arrangements for a responsible adult to stay on site while you are here and take you home after your surgery. You will not be allowed to leave alone or drive yourself home.   It is strongly suggested someone stay with you the first 24 hrs. Your surgery will be cancelled if you do not have a ride home. 8. A parent/legal guardian must accompany a child scheduled for surgery and plan to stay at the hospital until the child is discharged. Please do not bring other children with you. 9. Please wear simple, loose fitting clothing to the hospital.  David Jose not bring valuables (money, credit cards, checkbooks, etc.) Do not wear any makeup (including no eye makeup) or nail polish on your fingers or toes. 10. DO NOT wear any jewelry or piercings on day of surgery. All body piercing jewelry must be removed. 11. If you have ___dentures, they will be removed before going to the OR; we will provide you a container. If you wear ___contact lenses or ___glasses, they will be removed; please bring a case for them. 12. Please see your family doctor/pediatrician for a history & physical and/or concerning medications. Bring any test results/reports from your physician's office. PCP__________________Phone___________H&P Appt. Date________             13 If you  have a Living Will and Durable Power of  for Healthcare, please bring in a copy. 15. Notify your Surgeon if you develop any illness between now and surgery  time, cough, cold, fever, sore throat, nausea, vomiting, etc.  Please notify your surgeon if you experience dizziness, shortness of breath or blurred vision between now & the time of your surgery             15. DO NOT shave your operative site 96 hours prior to surgery. For face & neck surgery, men may use an electric razor 48 hours prior to surgery. 16. Shower the night before or morning of surgery using an antibacterial soap or as you have been instructed. 17. To provide excellent care visitors will be limited to one in the room at any given time. 18.  Please bring picture ID and insurance card.              19.  Visit our web site for additional information:  [email or web address]              20.During flu season no children under the age of 15 are permitted in the hospital for the safety of all patients. 21. If you take a long acting insulin in the evening only  take half of your usual  dose the night  before your procedure              22. If you use a c-pap please bring DOS if staying overnight,             23.For your convenience [facility] has a pharmacy on site to fill your prescriptions. 24. If you use oxygen and have a portable tank please bring it  with you the DOS             25. Bring a complete list of all your medications with name and dose include any supplements. 26. Other__________________________________________   *Please call pre admission testing if you any further questions   Martínez Dai   [address]    [place] [address] [place]  [phone]   [facility] [address] MAXWELL HEWITT   [phone]           COVID TESTING    _X__ Covid test to be done 3-5 days prior to scheduled surgery -patient aware they are REQUIRED to bring a copy of the negative result DOS-if they receive a positive result to notify their surgeon         If known - indicate where patient is getting covid test done ________[facility] 01/6______negative____Epic_________________________________________    ___ Rapid - DOS    ___ Other___________________________________      Laura Scales POLICY(subject to change)    There is a one visitor policy at [facility] for all surgeries and endoscopies. Whether the visitor can stay or will be asked to wait in the car will depend on the current policy and if social distancing can be maintained. The policy is subject to change at any time. Please make sure the visitor has a cell phone that is on,charged and able to accept calls, as this may be the way that the staff communicates with them. Pain management is NO VISITOR policyThe patients ride is expected to remain in the car with a cell phone for communication. If the ride is leaving the hospital grounds please make sure they are back in time for pickup. Have the patient inform the staff on arrival what their rides plans are while the patient is in the facility. At the MAIN there is one visitor allowed. Please note that the visitor policy is subject to change. All above information reviewed with patient in person or by phone. Patient verbalizes understanding. All questions and concerns addressed.                                                                                                  Patient/Rep___Patient_________________                                                                                                                                    PRE OP INSTRUCTIONS

## 2022-01-10 ENCOUNTER — TELEPHONE (OUTPATIENT)
Dept: SURGERY | Age: 62
End: 2022-01-10

## 2022-01-10 NOTE — TELEPHONE ENCOUNTER
I called patient back , I left a detailed message her surgery date and time has note changed. Her surgery is scheduled for 1/12/2022 @0815 arriving @ 0615. I encouraged her to call me back with any other questions or concerns.        Thanks, Valentin Rodarte

## 2022-01-12 ENCOUNTER — HOSPITAL ENCOUNTER (OUTPATIENT)
Age: 62
Setting detail: OUTPATIENT SURGERY
Discharge: HOME | End: 2022-01-12
Attending: SURGERY | Admitting: SURGERY
Payer: COMMERCIAL

## 2022-01-12 ENCOUNTER — ANESTHESIA (OUTPATIENT)
Dept: OPERATING ROOM | Age: 62
End: 2022-01-12
Payer: COMMERCIAL

## 2022-01-12 ENCOUNTER — HOSPITAL ENCOUNTER (OUTPATIENT)
Dept: WOMENS IMAGING | Age: 62
Setting detail: OUTPATIENT SURGERY
Discharge: HOME | End: 2022-01-12
Attending: SURGERY
Payer: COMMERCIAL

## 2022-01-12 ENCOUNTER — ANESTHESIA EVENT (OUTPATIENT)
Dept: OPERATING ROOM | Age: 62
End: 2022-01-12
Payer: COMMERCIAL

## 2022-01-12 VITALS
WEIGHT: 212 LBS | HEIGHT: 66 IN | TEMPERATURE: 98.1 F | BODY MASS INDEX: 34.07 KG/M2 | SYSTOLIC BLOOD PRESSURE: 128 MMHG | RESPIRATION RATE: 16 BRPM | OXYGEN SATURATION: 96 % | HEART RATE: 80 BPM | DIASTOLIC BLOOD PRESSURE: 88 MMHG

## 2022-01-12 VITALS
TEMPERATURE: 97.5 F | OXYGEN SATURATION: 94 % | RESPIRATION RATE: 7 BRPM | DIASTOLIC BLOOD PRESSURE: 58 MMHG | SYSTOLIC BLOOD PRESSURE: 146 MMHG

## 2022-01-12 DIAGNOSIS — G89.18 POSTOPERATIVE PAIN: Primary | ICD-10-CM

## 2022-01-12 LAB
ABO/RH: NORMAL
ANTIBODY SCREEN: NORMAL

## 2022-01-12 PROCEDURE — 3700000000 HC ANESTHESIA ATTENDED CARE: Performed by: SURGERY

## 2022-01-12 PROCEDURE — 14001 TIS TRNFR TRUNK 10.1-30SQCM: CPT | Performed by: SURGERY

## 2022-01-12 PROCEDURE — 88341 IMHCHEM/IMCYTCHM EA ADD ANTB: CPT

## 2022-01-12 PROCEDURE — 6360000002 HC RX W HCPCS

## 2022-01-12 PROCEDURE — 76098 X-RAY EXAM SURGICAL SPECIMEN: CPT

## 2022-01-12 PROCEDURE — 6360000002 HC RX W HCPCS: Performed by: NURSE ANESTHETIST, CERTIFIED REGISTERED

## 2022-01-12 PROCEDURE — 2500000003 HC RX 250 WO HCPCS: Performed by: NURSE ANESTHETIST, CERTIFIED REGISTERED

## 2022-01-12 PROCEDURE — 2709999900 HC NON-CHARGEABLE SUPPLY: Performed by: SURGERY

## 2022-01-12 PROCEDURE — 3600000004 HC SURGERY LEVEL 4 BASE: Performed by: SURGERY

## 2022-01-12 PROCEDURE — 7100000010 HC PHASE II RECOVERY - FIRST 15 MIN: Performed by: SURGERY

## 2022-01-12 PROCEDURE — 2500000003 HC RX 250 WO HCPCS: Performed by: SURGERY

## 2022-01-12 PROCEDURE — 86850 RBC ANTIBODY SCREEN: CPT

## 2022-01-12 PROCEDURE — 3700000001 HC ADD 15 MINUTES (ANESTHESIA): Performed by: SURGERY

## 2022-01-12 PROCEDURE — 86900 BLOOD TYPING SEROLOGIC ABO: CPT

## 2022-01-12 PROCEDURE — 88342 IMHCHEM/IMCYTCHM 1ST ANTB: CPT

## 2022-01-12 PROCEDURE — 88360 TUMOR IMMUNOHISTOCHEM/MANUAL: CPT

## 2022-01-12 PROCEDURE — 6370000000 HC RX 637 (ALT 250 FOR IP): Performed by: ANESTHESIOLOGY

## 2022-01-12 PROCEDURE — 2580000003 HC RX 258: Performed by: NURSE ANESTHETIST, CERTIFIED REGISTERED

## 2022-01-12 PROCEDURE — 86901 BLOOD TYPING SEROLOGIC RH(D): CPT

## 2022-01-12 PROCEDURE — 7100000011 HC PHASE II RECOVERY - ADDTL 15 MIN: Performed by: SURGERY

## 2022-01-12 PROCEDURE — 2720000010 HC SURG SUPPLY STERILE: Performed by: SURGERY

## 2022-01-12 PROCEDURE — 19125 EXCISION BREAST LESION: CPT | Performed by: SURGERY

## 2022-01-12 PROCEDURE — 88307 TISSUE EXAM BY PATHOLOGIST: CPT

## 2022-01-12 PROCEDURE — 7100000001 HC PACU RECOVERY - ADDTL 15 MIN: Performed by: SURGERY

## 2022-01-12 PROCEDURE — 6360000002 HC RX W HCPCS: Performed by: ANESTHESIOLOGY

## 2022-01-12 PROCEDURE — 6360000002 HC RX W HCPCS: Performed by: SURGERY

## 2022-01-12 PROCEDURE — 7100000000 HC PACU RECOVERY - FIRST 15 MIN: Performed by: SURGERY

## 2022-01-12 PROCEDURE — 3600000014 HC SURGERY LEVEL 4 ADDTL 15MIN: Performed by: SURGERY

## 2022-01-12 PROCEDURE — 2580000003 HC RX 258: Performed by: SURGERY

## 2022-01-12 RX ORDER — PHENYLEPHRINE HCL IN 0.9% NACL 1 MG/10 ML
SYRINGE (ML) INTRAVENOUS PRN
Status: DISCONTINUED | OUTPATIENT
Start: 2022-01-12 | End: 2022-01-12 | Stop reason: SDUPTHER

## 2022-01-12 RX ORDER — SCOLOPAMINE TRANSDERMAL SYSTEM 1 MG/1
1 PATCH, EXTENDED RELEASE TRANSDERMAL
Status: DISCONTINUED | OUTPATIENT
Start: 2022-01-12 | End: 2022-01-12 | Stop reason: HOSPADM

## 2022-01-12 RX ORDER — FENTANYL CITRATE 50 UG/ML
INJECTION, SOLUTION INTRAMUSCULAR; INTRAVENOUS PRN
Status: DISCONTINUED | OUTPATIENT
Start: 2022-01-12 | End: 2022-01-12 | Stop reason: SDUPTHER

## 2022-01-12 RX ORDER — SODIUM CHLORIDE 9 MG/ML
INJECTION, SOLUTION INTRAVENOUS CONTINUOUS PRN
Status: DISCONTINUED | OUTPATIENT
Start: 2022-01-12 | End: 2022-01-12

## 2022-01-12 RX ORDER — PROMETHAZINE HYDROCHLORIDE 12.5 MG/1
12.5 TABLET ORAL 4 TIMES DAILY PRN
Qty: 20 TABLET | Refills: 0 | Status: SHIPPED | OUTPATIENT
Start: 2022-01-12 | End: 2022-01-19

## 2022-01-12 RX ORDER — SODIUM CHLORIDE, SODIUM LACTATE, POTASSIUM CHLORIDE, CALCIUM CHLORIDE 600; 310; 30; 20 MG/100ML; MG/100ML; MG/100ML; MG/100ML
INJECTION, SOLUTION INTRAVENOUS CONTINUOUS PRN
Status: DISCONTINUED | OUTPATIENT
Start: 2022-01-12 | End: 2022-01-12 | Stop reason: SDUPTHER

## 2022-01-12 RX ORDER — SODIUM CHLORIDE 0.9 % (FLUSH) 0.9 %
5-40 SYRINGE (ML) INJECTION PRN
Status: DISCONTINUED | OUTPATIENT
Start: 2022-01-12 | End: 2022-01-12 | Stop reason: HOSPADM

## 2022-01-12 RX ORDER — OXYCODONE HYDROCHLORIDE 5 MG/1
5 TABLET ORAL PRN
Status: DISCONTINUED | OUTPATIENT
Start: 2022-01-12 | End: 2022-01-12 | Stop reason: HOSPADM

## 2022-01-12 RX ORDER — 0.9 % SODIUM CHLORIDE 0.9 %
500 INTRAVENOUS SOLUTION INTRAVENOUS
Status: DISCONTINUED | OUTPATIENT
Start: 2022-01-12 | End: 2022-01-12 | Stop reason: HOSPADM

## 2022-01-12 RX ORDER — FENTANYL CITRATE 50 UG/ML
50 INJECTION, SOLUTION INTRAMUSCULAR; INTRAVENOUS EVERY 5 MIN PRN
Status: DISCONTINUED | OUTPATIENT
Start: 2022-01-12 | End: 2022-01-12 | Stop reason: HOSPADM

## 2022-01-12 RX ORDER — FENTANYL CITRATE 50 UG/ML
25 INJECTION, SOLUTION INTRAMUSCULAR; INTRAVENOUS EVERY 5 MIN PRN
Status: DISCONTINUED | OUTPATIENT
Start: 2022-01-12 | End: 2022-01-12 | Stop reason: HOSPADM

## 2022-01-12 RX ORDER — HYDROCODONE BITARTRATE AND ACETAMINOPHEN 5; 325 MG/1; MG/1
1 TABLET ORAL EVERY 4 HOURS PRN
Qty: 42 TABLET | Refills: 0 | Status: SHIPPED | OUTPATIENT
Start: 2022-01-12 | End: 2022-01-19

## 2022-01-12 RX ORDER — PROPOFOL 10 MG/ML
INJECTION, EMULSION INTRAVENOUS PRN
Status: DISCONTINUED | OUTPATIENT
Start: 2022-01-12 | End: 2022-01-12 | Stop reason: SDUPTHER

## 2022-01-12 RX ORDER — SODIUM CHLORIDE 0.9 % (FLUSH) 0.9 %
5-40 SYRINGE (ML) INJECTION EVERY 12 HOURS SCHEDULED
Status: DISCONTINUED | OUTPATIENT
Start: 2022-01-12 | End: 2022-01-12 | Stop reason: HOSPADM

## 2022-01-12 RX ORDER — MIDAZOLAM HYDROCHLORIDE 1 MG/ML
INJECTION INTRAMUSCULAR; INTRAVENOUS PRN
Status: DISCONTINUED | OUTPATIENT
Start: 2022-01-12 | End: 2022-01-12 | Stop reason: SDUPTHER

## 2022-01-12 RX ORDER — SODIUM CHLORIDE 9 MG/ML
25 INJECTION, SOLUTION INTRAVENOUS PRN
Status: DISCONTINUED | OUTPATIENT
Start: 2022-01-12 | End: 2022-01-12 | Stop reason: HOSPADM

## 2022-01-12 RX ORDER — APREPITANT 40 MG/1
40 CAPSULE ORAL ONCE
Status: COMPLETED | OUTPATIENT
Start: 2022-01-12 | End: 2022-01-12

## 2022-01-12 RX ORDER — BUPIVACAINE HYDROCHLORIDE AND EPINEPHRINE 2.5; 5 MG/ML; UG/ML
INJECTION, SOLUTION EPIDURAL; INFILTRATION; INTRACAUDAL; PERINEURAL
Status: COMPLETED | OUTPATIENT
Start: 2022-01-12 | End: 2022-01-12

## 2022-01-12 RX ORDER — GLYCOPYRROLATE 1 MG/5 ML
SYRINGE (ML) INTRAVENOUS PRN
Status: DISCONTINUED | OUTPATIENT
Start: 2022-01-12 | End: 2022-01-12 | Stop reason: SDUPTHER

## 2022-01-12 RX ORDER — LABETALOL HYDROCHLORIDE 5 MG/ML
5 INJECTION, SOLUTION INTRAVENOUS EVERY 10 MIN PRN
Status: DISCONTINUED | OUTPATIENT
Start: 2022-01-12 | End: 2022-01-12 | Stop reason: HOSPADM

## 2022-01-12 RX ORDER — ONDANSETRON 2 MG/ML
4 INJECTION INTRAMUSCULAR; INTRAVENOUS
Status: DISCONTINUED | OUTPATIENT
Start: 2022-01-12 | End: 2022-01-12 | Stop reason: HOSPADM

## 2022-01-12 RX ORDER — ONDANSETRON 2 MG/ML
INJECTION INTRAMUSCULAR; INTRAVENOUS PRN
Status: DISCONTINUED | OUTPATIENT
Start: 2022-01-12 | End: 2022-01-12 | Stop reason: SDUPTHER

## 2022-01-12 RX ORDER — FENTANYL CITRATE 50 UG/ML
INJECTION, SOLUTION INTRAMUSCULAR; INTRAVENOUS
Status: COMPLETED
Start: 2022-01-12 | End: 2022-01-12

## 2022-01-12 RX ORDER — ACETAMINOPHEN 500 MG
1000 TABLET ORAL ONCE
Status: COMPLETED | OUTPATIENT
Start: 2022-01-12 | End: 2022-01-12

## 2022-01-12 RX ORDER — SUCCINYLCHOLINE/SOD CL,ISO/PF 200MG/10ML
SYRINGE (ML) INTRAVENOUS PRN
Status: DISCONTINUED | OUTPATIENT
Start: 2022-01-12 | End: 2022-01-12 | Stop reason: SDUPTHER

## 2022-01-12 RX ORDER — HYDRALAZINE HYDROCHLORIDE 20 MG/ML
5 INJECTION INTRAMUSCULAR; INTRAVENOUS EVERY 10 MIN PRN
Status: DISCONTINUED | OUTPATIENT
Start: 2022-01-12 | End: 2022-01-12 | Stop reason: HOSPADM

## 2022-01-12 RX ORDER — OXYCODONE HYDROCHLORIDE 5 MG/1
10 TABLET ORAL PRN
Status: DISCONTINUED | OUTPATIENT
Start: 2022-01-12 | End: 2022-01-12 | Stop reason: HOSPADM

## 2022-01-12 RX ORDER — HYDROMORPHONE HCL 110MG/55ML
PATIENT CONTROLLED ANALGESIA SYRINGE INTRAVENOUS PRN
Status: DISCONTINUED | OUTPATIENT
Start: 2022-01-12 | End: 2022-01-12 | Stop reason: SDUPTHER

## 2022-01-12 RX ORDER — LIDOCAINE HYDROCHLORIDE 20 MG/ML
INJECTION, SOLUTION EPIDURAL; INFILTRATION; INTRACAUDAL; PERINEURAL PRN
Status: DISCONTINUED | OUTPATIENT
Start: 2022-01-12 | End: 2022-01-12 | Stop reason: SDUPTHER

## 2022-01-12 RX ORDER — DEXAMETHASONE SODIUM PHOSPHATE 4 MG/ML
4 INJECTION, SOLUTION INTRA-ARTICULAR; INTRALESIONAL; INTRAMUSCULAR; INTRAVENOUS; SOFT TISSUE
Status: DISCONTINUED | OUTPATIENT
Start: 2022-01-12 | End: 2022-01-12 | Stop reason: HOSPADM

## 2022-01-12 RX ORDER — SODIUM CHLORIDE, SODIUM LACTATE, POTASSIUM CHLORIDE, CALCIUM CHLORIDE 600; 310; 30; 20 MG/100ML; MG/100ML; MG/100ML; MG/100ML
INJECTION, SOLUTION INTRAVENOUS CONTINUOUS
Status: DISCONTINUED | OUTPATIENT
Start: 2022-01-12 | End: 2022-01-12 | Stop reason: HOSPADM

## 2022-01-12 RX ORDER — DEXAMETHASONE SODIUM PHOSPHATE 4 MG/ML
INJECTION, SOLUTION INTRA-ARTICULAR; INTRALESIONAL; INTRAMUSCULAR; INTRAVENOUS; SOFT TISSUE PRN
Status: DISCONTINUED | OUTPATIENT
Start: 2022-01-12 | End: 2022-01-12 | Stop reason: SDUPTHER

## 2022-01-12 RX ORDER — MEPERIDINE HYDROCHLORIDE 25 MG/ML
12.5 INJECTION INTRAMUSCULAR; INTRAVENOUS; SUBCUTANEOUS EVERY 5 MIN PRN
Status: DISCONTINUED | OUTPATIENT
Start: 2022-01-12 | End: 2022-01-12 | Stop reason: HOSPADM

## 2022-01-12 RX ADMIN — FENTANYL CITRATE 25 MCG: 50 INJECTION, SOLUTION INTRAMUSCULAR; INTRAVENOUS at 10:15

## 2022-01-12 RX ADMIN — FENTANYL CITRATE 50 MCG: 50 INJECTION, SOLUTION INTRAMUSCULAR; INTRAVENOUS at 08:47

## 2022-01-12 RX ADMIN — HYDROMORPHONE HYDROCHLORIDE 0.5 MG: 2 INJECTION, SOLUTION INTRAMUSCULAR; INTRAVENOUS; SUBCUTANEOUS at 09:48

## 2022-01-12 RX ADMIN — SODIUM CHLORIDE, POTASSIUM CHLORIDE, SODIUM LACTATE AND CALCIUM CHLORIDE: 600; 310; 30; 20 INJECTION, SOLUTION INTRAVENOUS at 08:31

## 2022-01-12 RX ADMIN — ONDANSETRON 4 MG: 2 INJECTION INTRAMUSCULAR; INTRAVENOUS at 08:45

## 2022-01-12 RX ADMIN — SODIUM CHLORIDE, POTASSIUM CHLORIDE, SODIUM LACTATE AND CALCIUM CHLORIDE: 600; 310; 30; 20 INJECTION, SOLUTION INTRAVENOUS at 07:21

## 2022-01-12 RX ADMIN — Medication 0.2 MG: at 08:55

## 2022-01-12 RX ADMIN — Medication 100 MCG: at 09:13

## 2022-01-12 RX ADMIN — ACETAMINOPHEN 1000 MG: 500 TABLET ORAL at 07:22

## 2022-01-12 RX ADMIN — Medication 200 MCG: at 09:00

## 2022-01-12 RX ADMIN — APREPITANT 40 MG: 40 CAPSULE ORAL at 07:22

## 2022-01-12 RX ADMIN — MIDAZOLAM 2 MG: 1 INJECTION INTRAMUSCULAR; INTRAVENOUS at 08:29

## 2022-01-12 RX ADMIN — LIDOCAINE HYDROCHLORIDE 100 MG: 20 INJECTION, SOLUTION EPIDURAL; INFILTRATION; INTRACAUDAL; PERINEURAL at 08:38

## 2022-01-12 RX ADMIN — Medication 160 MG: at 08:38

## 2022-01-12 RX ADMIN — DEXAMETHASONE SODIUM PHOSPHATE 12 MG: 4 INJECTION, SOLUTION INTRAMUSCULAR; INTRAVENOUS at 08:45

## 2022-01-12 RX ADMIN — PROPOFOL 200 MG: 10 INJECTION, EMULSION INTRAVENOUS at 08:38

## 2022-01-12 RX ADMIN — PHENYLEPHRINE HYDROCHLORIDE 20 MCG/MIN: 10 INJECTION INTRAVENOUS at 09:19

## 2022-01-12 RX ADMIN — FENTANYL CITRATE 25 MCG: 50 INJECTION INTRAMUSCULAR; INTRAVENOUS at 10:15

## 2022-01-12 RX ADMIN — HYDROMORPHONE HYDROCHLORIDE 0.5 MG: 2 INJECTION, SOLUTION INTRAMUSCULAR; INTRAVENOUS; SUBCUTANEOUS at 09:56

## 2022-01-12 RX ADMIN — FENTANYL CITRATE 50 MCG: 50 INJECTION, SOLUTION INTRAMUSCULAR; INTRAVENOUS at 08:38

## 2022-01-12 RX ADMIN — CEFAZOLIN 2000 MG: 10 INJECTION, POWDER, FOR SOLUTION INTRAVENOUS; PARENTERAL at 08:30

## 2022-01-12 RX ADMIN — Medication 200 MCG: at 08:55

## 2022-01-12 ASSESSMENT — PULMONARY FUNCTION TESTS
PIF_VALUE: 17
PIF_VALUE: 17
PIF_VALUE: 19
PIF_VALUE: 17
PIF_VALUE: 22
PIF_VALUE: 17
PIF_VALUE: 17
PIF_VALUE: 16
PIF_VALUE: 15
PIF_VALUE: 17
PIF_VALUE: 17
PIF_VALUE: 1
PIF_VALUE: 17
PIF_VALUE: 17
PIF_VALUE: 30
PIF_VALUE: 17
PIF_VALUE: 17
PIF_VALUE: 1
PIF_VALUE: 1
PIF_VALUE: 41
PIF_VALUE: 17
PIF_VALUE: 15
PIF_VALUE: 20
PIF_VALUE: 17
PIF_VALUE: 17
PIF_VALUE: 23
PIF_VALUE: 16
PIF_VALUE: 16
PIF_VALUE: 17
PIF_VALUE: 0
PIF_VALUE: 18
PIF_VALUE: 17
PIF_VALUE: 1
PIF_VALUE: 17
PIF_VALUE: 19
PIF_VALUE: 17
PIF_VALUE: 17
PIF_VALUE: 16
PIF_VALUE: 16
PIF_VALUE: 17
PIF_VALUE: 1
PIF_VALUE: 17
PIF_VALUE: 3
PIF_VALUE: 17
PIF_VALUE: 16
PIF_VALUE: 17
PIF_VALUE: 2
PIF_VALUE: 2
PIF_VALUE: 17
PIF_VALUE: 14
PIF_VALUE: 21
PIF_VALUE: 17
PIF_VALUE: 0
PIF_VALUE: 14
PIF_VALUE: 1
PIF_VALUE: 1
PIF_VALUE: 16
PIF_VALUE: 17
PIF_VALUE: 14
PIF_VALUE: 16
PIF_VALUE: 17
PIF_VALUE: 1
PIF_VALUE: 18
PIF_VALUE: 16
PIF_VALUE: 0
PIF_VALUE: 16
PIF_VALUE: 17
PIF_VALUE: 1
PIF_VALUE: 17
PIF_VALUE: 14
PIF_VALUE: 17
PIF_VALUE: 1

## 2022-01-12 ASSESSMENT — PAIN SCALES - GENERAL
PAINLEVEL_OUTOF10: 5
PAINLEVEL_OUTOF10: 8
PAINLEVEL_OUTOF10: 3
PAINLEVEL_OUTOF10: 8

## 2022-01-12 ASSESSMENT — LIFESTYLE VARIABLES: SMOKING_STATUS: 0

## 2022-01-12 ASSESSMENT — PAIN - FUNCTIONAL ASSESSMENT: PAIN_FUNCTIONAL_ASSESSMENT: 0-10

## 2022-01-12 ASSESSMENT — PAIN DESCRIPTION - ORIENTATION
ORIENTATION: LEFT
ORIENTATION: LEFT

## 2022-01-12 ASSESSMENT — PAIN DESCRIPTION - PAIN TYPE
TYPE: SURGICAL PAIN
TYPE: SURGICAL PAIN

## 2022-01-12 ASSESSMENT — PAIN DESCRIPTION - LOCATION
LOCATION: BREAST
LOCATION: BREAST

## 2022-01-12 NOTE — H&P (VIEW-ONLY)
The most recent H&P, breast imaging and documentation has been reviewed. There are no changes. All questions have been answered. Risks associated with coronavirus have been reviewed. We will proceed with a left breast two site excisional biopsy.

## 2022-01-12 NOTE — ANESTHESIA POSTPROCEDURE EVALUATION
Department of Anesthesiology  Postprocedure Note    Patient: Jenn Fregoso  MRN: [id number]  YOB: 1960  Date of evaluation: 1/12/2022  Time:  10:52 AM     Procedure Summary     Date: 01/12/22 Room / Location: [address]    Anesthesia Start: 0831 Anesthesia Stop: 1004    Procedure: LEFT LOCALIZED TWO - SITE EXCISIONAL BREAST BIOPSY (Left Breast) Diagnosis: (N60.92  ATYPICAL DUCTAL HYPERPLASIA OF LEFT BREAST - PRIMARY)    Surgeons: Nivia Aguilar MD Responsible Provider: Daniel Bishop MD    Anesthesia Type: general ASA Status: 2          Anesthesia Type: general    Joel Phase I: Joel Score: 8    Joel Phase II:      Last vitals: Reviewed and per EMR flowsheets.        Anesthesia Post Evaluation    Patient location during evaluation: PACU  Patient participation: complete - patient participated  Level of consciousness: awake and alert  Pain score: 2  Airway patency: patent  Nausea & Vomiting: no nausea and no vomiting  Complications: no  Cardiovascular status: blood pressure returned to baseline  Respiratory status: acceptable  Hydration status: euvolemic  Comments: Sore throat  Multimodal analgesia pain management approach

## 2022-01-12 NOTE — PROGRESS NOTES
Adm to pacu from or per cart awakening. Respirations easy & symmetrical. Left breast incision clean & well-approximated with surgical glue. Fluffs & surgical bra in place. C/O operative pain. Will medicate. See mar. VSS. Report received from or staff.

## 2022-01-12 NOTE — PROGRESS NOTES
Awake alert & oriented. Respirations easy & symmetrical. Lt breast incision clean & well-approximated with surgical glue. Fluffs & surgical bra in place. States operative pain is tolerable. Patient discharged per wheelchair to the care of responsible party. No additional questions voiced related to discharge information. Patient discharged with all personal items.

## 2022-01-12 NOTE — OP NOTE
Operative Note    Postoperative Note    Gage Mercedes  YOB: 1960  [id number]    Pre-operative Diagnosis: left breast atypical ductal hyperplasia    Post-operative Diagnosis: Same    Procedure: left localized 2 site excisional breast biopsy, left tissue rearrangement procedure for area of 25 sq. cm. Anesthesia: General     Surgeons/Assistants: Jyotsna Moore  Assistant: Darius Calvillo    Estimated Blood Loss: less than 50     Drains: none    Complications: none apparent by completion of procedure    Specimens: left breast tissue    Findings: specimen radiograph shows capture of lesion in question    Post-Op Condition: Stable    Disposition: to recovery room    Description of Procedure:   Ms. Hayley Schrader is a 57 y.o. woman with left breast atypical ductal hyperplasia. She has elected to proceed with left excisional breast biopsy to assess possibility of upgrade diagnosis. The indications for the planned procedure, along with the potential benefits and risks which include but are not limited to the risk of anesthesia, bleeding, infection, possible failed operation, possible need for additional surgery pending final pathologic assessment, sensation changes, and unappealing cosmetics were reviewed. All questions were answered and she agrees to proceed. Ms. Hayley Schrader was met by me in the preoperative area. The surgical site was identified. The patient's surgical site was marked. Consent was obtained. The appropriate breast imaging was reviewed. She underwent an image guided localization procedure preoperatively. The left breast lesion was again noted with the biopsy clip. A localizer marks the second target area. The localizers are in good position. She was brought to the operating room and placed supine with her arms extended on boards. She was appropriately positioned and padded. Compression stockings were placed.   Appropriate antibiotics were administered within 60 minutes of the incision. Breast imaging was available in the room. After induction of general anesthesia, the appropriate World Health Organization timeout procedure was performed. The left breast and axillary region, upper arm, and chest wall were prepped and draped in the normal sterile fashion. There were two localizers placed by the mammographer marking the left breast targets. The skin and soft tissues over the proposed incision were infiltrated with local. A radial was made at the 3-4:00 location. Flaps were raised and dissection was carried out in a rectangular fashion around the targets with localizers. No skin was removed. Dissection was not carried to the chest wall. Dissection was carried out carefully to try and maintain the localizer centrally within the specimen. The specimen was excised in toto. The specimen was oriented with a margin map. It was submitted for specimen radiography which revealed the lesion in question with close cranial but otherwise adequate radiographic margins. An additional margin was obtained cranially with a stitch on the new margin. The wound was irrigated and hemostasis was obtained. In order to obtain the best cosmesis while closing the surgical cavity, I performed a tissue rearrangement. A separate tissue incision was made superiorly and inferiorly. Flaps were elevated and advanced for a total area of 25 square cm. Hemostasis was reassessed. The incision was closed in layers using a 3-0 monocryl stitch followed by a 4-0 monocryl subcuticular approximation. Skin glue was applied. The instrument, sponge, and needle counts were correct. Ms. Madai Polo was awakened and placed into a surgical bra. She was taken to the recovery room in stable condition. Her family was notified of intraoperative findings.       Electronically signed by Sammy Bui MD on 1/12/22 at 8:29 AM EST

## 2022-01-12 NOTE — PROGRESS NOTES
ALL DISCHARGE INFORMATION EXPLAINED TO PT AT BEDSIDE AND RESPONSIBLE PARTY  IAN TO INCLUDE PAIN MANAGEMENT, DIET, ACTIVITY, WOUND CARE ET UNDERSTANDING VOICED. PT HAS CLEAR UNDERSTANDING OF THEIR HOME MEDS. EDUCATIONAL PIECE COMPLETED RELATED TO NEW MEDICATIONS ORDERED TO INCLUDE WRITTEN MATERIAL. PT HAS BEEN ASSESSED TO BE AT POTENTIAL RISK FOR FALLS RELATED TO RECEIVING ANESTHESIA/MEDICATIONS IN SURGERY. PT AND FAMILY GIVEN INFORMATION ON ASSISTED AMBULATION POST OP.  PAPERS SIGNED AND COPIES GIVEN

## 2022-01-12 NOTE — PROGRESS NOTES
Awake & alert but drowsy. Maintaining oxygen saturation above 93% on room air. States pain is relieved to tolerable level. Tolerating sips of soda. Meets criteria for phase 2.

## 2022-01-12 NOTE — ANESTHESIA PRE PROCEDURE
Department of Anesthesiology  Preprocedure Note       Name:  Madhavi Tapia   Age:  57 y.o.  :  1960                                          MRN:  [id number]         Date:  2022      Surgeon: Aleena García):  Brad Pimentel MD    Procedure: Procedure(s):  LEFT LOCALIZED TWO - SITE EXCISIONAL BREAST BIOPSY    Medications prior to admission:   Prior to Admission medications    Medication Sig Start Date End Date Taking? Authorizing Provider   levothyroxine (SYNTHROID) 75 MCG tablet TAKE ONE TABLET BY MOUTH DAILY ON AN EMPTY STOMACH TAKE WITH WATER AND WAIT 30 MINUTES BEFORE EATING OR TAKING OTHER MEDS, 21  Yes Gayle Davis MD   amLODIPine (NORVASC) 10 MG tablet Take 1 tablet by mouth daily 21  Yes Gayle Davis MD       Current medications:    Current Facility-Administered Medications   Medication Dose Route Frequency Provider Last Rate Last Admin    0.9 % sodium chloride infusion  25 mL IntraVENous PRN Brad Pimentel MD        ceFAZolin (ANCEF) 2000 mg in dextrose 5 % 50 mL IVPB  2,000 mg IntraVENous On Call to [address] [place] [address], MD        lactated ringers infusion   IntraVENous Continuous Brad Pimentel MD        sodium chloride flush 0.9 % injection 5-40 mL  5-40 mL IntraVENous 2 times per day Brad Pimentel MD        sodium chloride flush 0.9 % injection 5-40 mL  5-40 mL IntraVENous PRN Brad Pimentel MD           Allergies:  No Known Allergies    Problem List:    Patient Active Problem List   Diagnosis Code    Fatigue R53.83    Migraines G43.909    Arthritis M19.90    Acquired hypothyroidism E03.9    Vitamin D deficiency E55.9    Abnormal finding on EKG R94.31    History of DVT (deep vein thrombosis) Z86.718    Obesity (BMI 30.0-34. 9) E66.9    Essential hypertension I10    S/P total knee replacement, left Z96.652    Insomnia G47.00    Obstructive sleep apnea syndrome G47.33    Depression F32. A    Hypercholesterolemia E78.00 Past Medical History:        Diagnosis Date    Abnormal finding on EKG 3/21/2013    Normal stress myoview 3/21/13     Arthritis     widespread, former ballerina    Degenerative disc disease, cervical     cervical  disc fusion C4-C7; Dr. Roshni Harris    History of depression     History of DVT (deep vein thrombosis)     s/p arthroscopy, left    Migraines     Parotid mass 2017    S/p excision 2018. Pleomorphic adenoma. Dr. Rigoberto Amador Sleep apnea     no cpap. [facility]    Thyroid disease        Past Surgical History:        Procedure Laterality Date    CERVICAL FUSION  2005    C4-7, decompressive anterior discectomies and foraminotomiesC4-7;Bohinski    HIP ARTHROPLASTY Right [id number]    RIGHT TOTAL HIP ARTHROPLASTY ANTERIOR APPROACH    HIP ARTHROPLASTY Left 2017    JOINT REPLACEMENT Left 2017    Dr. Olga Gasca ARTHROSCOPY      bilateral, multiple    GIANNA BREAST LOC ADDITIONAL LESION LEFT Left 2022    GIANNA BREAST LOC ADDITIONAL LESION LEFT 2022 [facility]    GIANNA STEROTACTIC LOC BREAST BIOPSY LEFT Left 10/25/2021    GIANNA STEROTACTIC LOC BREAST BIOPSY LEFT 10/25/2021 [facility] [address]    NASAL SEPTUM SURGERY      PLANTAR FASCIA SURGERY  2006    left; Dr. Andrea Rushing      left superficial parotidectomy, pleomorphic adenoma    TOTAL KNEE ARTHROPLASTY      left; Dr. Lesley Aldridge Right 12    Dr. Hola Golden       Social History:    Social History     Tobacco Use    Smoking status: Former Smoker     Packs/day: 0.50     Years: 3.00     Pack years: 1.50     Quit date: 1991     Years since quittin.2    Smokeless tobacco: Never Used   Substance Use Topics    Alcohol use:  Yes     Alcohol/week: 0.8 - 1.7 standard drinks     Types: 1 - 2 Standard drinks or equivalent per week     Comment: 6/week                                Counseling given: Not Answered      Vital Signs (Current):   Vitals:    22 1626 01/12/22 0653   BP:  (!) 147/93   Pulse:  92   Resp:  16   Temp:  97.8 °F (36.6 °C)   TempSrc:  Temporal   SpO2:  97%   Weight: 212 lb (96.2 kg) 212 lb (96.2 kg)   Height: 5' 6\" (1.676 m) 5' 6\" (1.676 m)                                              BP Readings from Last 3 Encounters:   01/12/22 (!) 147/93   01/05/22 132/78   12/02/21 (!) 156/90       NPO Status: Time of last liquid consumption: 0000                        Time of last solid consumption: 0000                        Date of last liquid consumption: 01/12/22                        Date of last solid food consumption: 01/12/22    BMI:   Wt Readings from Last 3 Encounters:   01/12/22 212 lb (96.2 kg)   01/05/22 212 lb (96.2 kg)   12/02/21 216 lb (98 kg)     Body mass index is 34.22 kg/m². CBC:   Lab Results   Component Value Date    WBC 9.6 08/20/2019    RBC 4.54 08/20/2019    HGB 13.2 08/20/2019    HCT 39.3 08/20/2019    MCV 86.6 08/20/2019    RDW 13.4 08/20/2019     08/20/2019       CMP:   Lab Results   Component Value Date     01/05/2022    K 3.7 01/05/2022     01/05/2022    CO2 23 01/05/2022    BUN 15 01/05/2022    CREATININE 0.7 01/05/2022    GFRAA >60 01/05/2022    GFRAA 104 03/15/2013    AGRATIO 2.0 08/20/2019    LABGLOM >60 01/05/2022    GLUCOSE 97 01/05/2022    PROT 6.8 08/20/2019    PROT 7.5 11/08/2011    CALCIUM 9.3 01/05/2022    BILITOT 0.7 08/20/2019    ALKPHOS 55 08/20/2019    AST 21 08/20/2019    ALT 26 08/20/2019       POC Tests: No results for input(s): POCGLU, POCNA, POCK, POCCL, POCBUN, POCHEMO, POCHCT in the last 72 hours.     Coags:   Lab Results   Component Value Date    PROTIME 10.8 06/13/2017    INR 0.96 06/13/2017    APTT 25.9 03/15/2013       HCG (If Applicable): No results found for: PREGTESTUR, PREGSERUM, HCG, HCGQUANT     ABGs: No results found for: PHART, PO2ART, TLH0SAB, JEV2EIM, BEART, M5GUWPUU     Type & Screen (If Applicable):  No results found for: LABABO, LABRH    Drug/Infectious Status (If Applicable):  No results found for: HIV, HEPCAB    COVID-19 Screening (If Applicable):   Lab Results   Component Value Date    COVID19 Not Detected 01/06/2022           Anesthesia Evaluation  Patient summary reviewed   history of anesthetic complications: PONV. Airway: Mallampati: II  TM distance: <3 FB   Neck ROM: limited  Mouth opening: > = 3 FB Dental: normal exam         Pulmonary:   (+) sleep apnea:      (-) not a current smoker                           Cardiovascular:    (+) hypertension:,                   Neuro/Psych:   (+) psychiatric history: stable with treatment            GI/Hepatic/Renal:             Endo/Other:    (+) hypothyroidism::., .                 Abdominal:             Vascular: Other Findings:             Anesthesia Plan      general     ASA 2     (OK for LMA. Scopolamine/emend/tylenol ordered pre-op.)  Induction: intravenous. MIPS: Postoperative opioids intended and Prophylactic antiemetics administered.                       Jair Szymanski MD   1/12/2022

## 2022-01-18 DIAGNOSIS — D05.12 DUCTAL CARCINOMA IN SITU OF LEFT BREAST: Primary | ICD-10-CM

## 2022-01-18 DIAGNOSIS — C50.912 PRIMARY CANCER OF LEFT FEMALE BREAST (HCC): ICD-10-CM

## 2022-01-18 NOTE — PROGRESS NOTES
Name_______________________________________Printed:____________________  Date and time of surgery__01/19/22_____1400_________________Arrival Time:___1230___[facility]__________   1. The instructions given regarding when and if a patient needs to stop oral intake prior to surgery varies. Follow the specific instructions you were given                  _X__Nothing to eat or to drink after Midnight the night before.                   ____Carbo loading or ERAS instructions will be given to select patients-if you have been given those instructions -please do the following                           The evening before your surgery after dinner before midnight drink 40 ounces of gatorade. If you are diabetic use sugar free. The morning of surgery drink 40 ounces of water. This needs to be finished 3 hours prior to your surgery start time. 2. Take the following pills with a small sip of water on the morning of surgery___All AM Meds with Sip of H2O________________________________________________                  Do not take blood pressure medications ending in pril or sartan the kenneth prior to surgery or the morning of surgery_   3. Aspirin, Ibuprofen, Advil, Naproxen, Vitamin E and other Anti-inflammatory products and supplements should be stopped for 5 -7days before surgery or as directed by your physician. 4. Check with your Doctor regarding stopping Plavix, Coumadin,Eliquis, Lovenox,Effient,Pradaxa,Xarelto, Fragmin or other blood thinners and follow their instructions. 5. Do not smoke, and do not drink any alcoholic beverages 24 hours prior to surgery. This includes NA Beer. Refrain from the usage of any recreational drugs. 6. You may brush your teeth and gargle the morning of surgery. DO NOT SWALLOW WATER   7. You MUST make arrangements for a responsible adult to stay on site while you are here and take you home after your surgery. You will not be allowed to leave alone or drive yourself home.   It is strongly suggested someone stay with you the first 24 hrs. Your surgery will be cancelled if you do not have a ride home. 8. A parent/legal guardian must accompany a child scheduled for surgery and plan to stay at the hospital until the child is discharged. Please do not bring other children with you. 9. Please wear simple, loose fitting clothing to the hospital.  [place] not bring valuables (money, credit cards, checkbooks, etc.) Do not wear any makeup (including no eye makeup) or nail polish on your fingers or toes. 10. DO NOT wear any jewelry or piercings on day of surgery. All body piercing jewelry must be removed. 11. If you have ___dentures, they will be removed before going to the OR; we will provide you a container. If you wear ___contact lenses or ___glasses, they will be removed; please bring a case for them. 12. Please see your family doctor/pediatrician for a history & physical and/or concerning medications. Bring any test results/reports from your physician's office. PCP__________________Phone___________H&P Appt. Date________             13 If you  have a Living Will and Durable Power of  for Healthcare, please bring in a copy. 15. Notify your Surgeon if you develop any illness between now and surgery  time, cough, cold, fever, sore throat, nausea, vomiting, etc.  Please notify your surgeon if you experience dizziness, shortness of breath or blurred vision between now & the time of your surgery             15. DO NOT shave your operative site 96 hours prior to surgery. For face & neck surgery, men may use an electric razor 48 hours prior to surgery. 16. Shower the night before or morning of surgery using an antibacterial soap or as you have been instructed. 17. To provide excellent care visitors will be limited to one in the room at any given time. 18.  Please bring picture ID and insurance card.              19.  Visit our web site for additional information:  [email or web address]              20.During flu season no children under the age of 15 are permitted in the hospital for the safety of all patients. 21. If you take a long acting insulin in the evening only  take half of your usual  dose the night  before your procedure              22. If you use a c-pap please bring DOS if staying overnight,             23.For your convenience [facility] has a pharmacy on site to fill your prescriptions. 24. If you use oxygen and have a portable tank please bring it  with you the DOS             25. Bring a complete list of all your medications with name and dose include any supplements. 26. Other__________________________________________   *Please call pre admission testing if you any further questions   Manuel Dai   [address] [place] [address]. [place]  [phone]   [facility]    ___ Covid test to be done 3-5 days prior to scheduled surgery -patient aware they are REQUIRED to bring a copy of the negative result DOS-if they receive a positive result to notify their surgeon         If known - indicate where patient is getting covid test done ___________________________________________________________    _X__ Rapid - DOS    ___ Other___________________________________      Greta Vega POLICY(subject to change)    There is a one visitor policy at [facility] for all surgeries and endoscopies. Whether the visitor can stay or will be asked to wait in the car will depend on the current policy and if social distancing can be maintained. The policy is subject to change at any time. Please make sure the visitor has a cell phone that is on,charged and able to accept calls, as this may be the way that the staff communicates with them. Pain management is NO VISITOR policyThe patients ride is expected to remain in the car with a cell phone for communication. If the ride is leaving the hospital grounds please make sure they are back in time for pickup. Have the patient inform the staff on arrival what their rides plans are while the patient is in the facility. At the MAIN there is one visitor allowed. Please note that the visitor policy is subject to change. All above information reviewed with patient in person or by phone. Patient verbalizes understanding. All questions and concerns addressed.                                                                                                  Patient/Rep___Patient_________________                                                                                                                                    PRE OP INSTRUCTIONS

## 2022-01-19 ENCOUNTER — ANESTHESIA (OUTPATIENT)
Dept: OPERATING ROOM | Age: 62
End: 2022-01-19
Payer: COMMERCIAL

## 2022-01-19 ENCOUNTER — HOSPITAL ENCOUNTER (OUTPATIENT)
Age: 62
Setting detail: OUTPATIENT SURGERY
Discharge: HOME | End: 2022-01-19
Attending: SURGERY | Admitting: SURGERY
Payer: COMMERCIAL

## 2022-01-19 ENCOUNTER — ANESTHESIA EVENT (OUTPATIENT)
Dept: OPERATING ROOM | Age: 62
End: 2022-01-19
Payer: COMMERCIAL

## 2022-01-19 VITALS
TEMPERATURE: 97.2 F | OXYGEN SATURATION: 97 % | DIASTOLIC BLOOD PRESSURE: 62 MMHG | RESPIRATION RATE: 7 BRPM | SYSTOLIC BLOOD PRESSURE: 102 MMHG

## 2022-01-19 VITALS
OXYGEN SATURATION: 99 % | BODY MASS INDEX: 33.59 KG/M2 | SYSTOLIC BLOOD PRESSURE: 139 MMHG | RESPIRATION RATE: 14 BRPM | WEIGHT: 209 LBS | HEIGHT: 66 IN | DIASTOLIC BLOOD PRESSURE: 93 MMHG | HEART RATE: 79 BPM | TEMPERATURE: 97.4 F

## 2022-01-19 DIAGNOSIS — G89.18 POSTOPERATIVE PAIN: Primary | ICD-10-CM

## 2022-01-19 DIAGNOSIS — R11.0 NAUSEA: ICD-10-CM

## 2022-01-19 LAB — SARS-COV-2, NAAT: NOT DETECTED

## 2022-01-19 PROCEDURE — 6360000002 HC RX W HCPCS: Performed by: NURSE ANESTHETIST, CERTIFIED REGISTERED

## 2022-01-19 PROCEDURE — 7100000011 HC PHASE II RECOVERY - ADDTL 15 MIN: Performed by: SURGERY

## 2022-01-19 PROCEDURE — 3700000000 HC ANESTHESIA ATTENDED CARE: Performed by: SURGERY

## 2022-01-19 PROCEDURE — 2500000003 HC RX 250 WO HCPCS: Performed by: SURGERY

## 2022-01-19 PROCEDURE — 6360000002 HC RX W HCPCS: Performed by: ANESTHESIOLOGY

## 2022-01-19 PROCEDURE — 7100000001 HC PACU RECOVERY - ADDTL 15 MIN: Performed by: SURGERY

## 2022-01-19 PROCEDURE — 19301 PARTIAL MASTECTOMY: CPT | Performed by: SURGERY

## 2022-01-19 PROCEDURE — 14001 TIS TRNFR TRUNK 10.1-30SQCM: CPT | Performed by: SURGERY

## 2022-01-19 PROCEDURE — 6360000002 HC RX W HCPCS: Performed by: SURGERY

## 2022-01-19 PROCEDURE — 88307 TISSUE EXAM BY PATHOLOGIST: CPT

## 2022-01-19 PROCEDURE — 2580000003 HC RX 258: Performed by: SURGERY

## 2022-01-19 PROCEDURE — 2709999900 HC NON-CHARGEABLE SUPPLY: Performed by: SURGERY

## 2022-01-19 PROCEDURE — 2500000003 HC RX 250 WO HCPCS: Performed by: NURSE ANESTHETIST, CERTIFIED REGISTERED

## 2022-01-19 PROCEDURE — 7100000000 HC PACU RECOVERY - FIRST 15 MIN: Performed by: SURGERY

## 2022-01-19 PROCEDURE — 7100000010 HC PHASE II RECOVERY - FIRST 15 MIN: Performed by: SURGERY

## 2022-01-19 PROCEDURE — 6370000000 HC RX 637 (ALT 250 FOR IP): Performed by: ANESTHESIOLOGY

## 2022-01-19 PROCEDURE — A4217 STERILE WATER/SALINE, 500 ML: HCPCS | Performed by: SURGERY

## 2022-01-19 PROCEDURE — 3600000004 HC SURGERY LEVEL 4 BASE: Performed by: SURGERY

## 2022-01-19 PROCEDURE — 3600000014 HC SURGERY LEVEL 4 ADDTL 15MIN: Performed by: SURGERY

## 2022-01-19 PROCEDURE — 3700000001 HC ADD 15 MINUTES (ANESTHESIA): Performed by: SURGERY

## 2022-01-19 PROCEDURE — 2580000003 HC RX 258: Performed by: ANESTHESIOLOGY

## 2022-01-19 PROCEDURE — 87635 SARS-COV-2 COVID-19 AMP PRB: CPT

## 2022-01-19 RX ORDER — EPHEDRINE SULFATE/0.9% NACL/PF 50 MG/5 ML
SYRINGE (ML) INTRAVENOUS PRN
Status: DISCONTINUED | OUTPATIENT
Start: 2022-01-19 | End: 2022-01-19 | Stop reason: SDUPTHER

## 2022-01-19 RX ORDER — SODIUM CHLORIDE 0.9 % (FLUSH) 0.9 %
10 SYRINGE (ML) INJECTION EVERY 12 HOURS SCHEDULED
Status: DISCONTINUED | OUTPATIENT
Start: 2022-01-19 | End: 2022-01-19 | Stop reason: HOSPADM

## 2022-01-19 RX ORDER — LIDOCAINE HYDROCHLORIDE 20 MG/ML
INJECTION, SOLUTION EPIDURAL; INFILTRATION; INTRACAUDAL; PERINEURAL PRN
Status: DISCONTINUED | OUTPATIENT
Start: 2022-01-19 | End: 2022-01-19 | Stop reason: SDUPTHER

## 2022-01-19 RX ORDER — BUPIVACAINE HYDROCHLORIDE 2.5 MG/ML
INJECTION, SOLUTION EPIDURAL; INFILTRATION; INTRACAUDAL
Status: COMPLETED | OUTPATIENT
Start: 2022-01-19 | End: 2022-01-19

## 2022-01-19 RX ORDER — PROCHLORPERAZINE EDISYLATE 5 MG/ML
5 INJECTION INTRAMUSCULAR; INTRAVENOUS
Status: DISCONTINUED | OUTPATIENT
Start: 2022-01-19 | End: 2022-01-19 | Stop reason: HOSPADM

## 2022-01-19 RX ORDER — KETAMINE HCL IN NACL, ISO-OSM 100MG/10ML
SYRINGE (ML) INJECTION PRN
Status: DISCONTINUED | OUTPATIENT
Start: 2022-01-19 | End: 2022-01-19 | Stop reason: SDUPTHER

## 2022-01-19 RX ORDER — SODIUM CHLORIDE 9 MG/ML
25 INJECTION, SOLUTION INTRAVENOUS PRN
Status: DISCONTINUED | OUTPATIENT
Start: 2022-01-19 | End: 2022-01-19 | Stop reason: HOSPADM

## 2022-01-19 RX ORDER — HYDROMORPHONE HCL 110MG/55ML
0.5 PATIENT CONTROLLED ANALGESIA SYRINGE INTRAVENOUS EVERY 5 MIN PRN
Status: DISCONTINUED | OUTPATIENT
Start: 2022-01-19 | End: 2022-01-19 | Stop reason: HOSPADM

## 2022-01-19 RX ORDER — HYDRALAZINE HYDROCHLORIDE 20 MG/ML
5 INJECTION INTRAMUSCULAR; INTRAVENOUS EVERY 10 MIN PRN
Status: DISCONTINUED | OUTPATIENT
Start: 2022-01-19 | End: 2022-01-19 | Stop reason: HOSPADM

## 2022-01-19 RX ORDER — APREPITANT 40 MG/1
40 CAPSULE ORAL ONCE
Status: COMPLETED | OUTPATIENT
Start: 2022-01-19 | End: 2022-01-19

## 2022-01-19 RX ORDER — SODIUM CHLORIDE, SODIUM LACTATE, POTASSIUM CHLORIDE, CALCIUM CHLORIDE 600; 310; 30; 20 MG/100ML; MG/100ML; MG/100ML; MG/100ML
INJECTION, SOLUTION INTRAVENOUS CONTINUOUS
Status: DISCONTINUED | OUTPATIENT
Start: 2022-01-19 | End: 2022-01-19 | Stop reason: HOSPADM

## 2022-01-19 RX ORDER — CEPHALEXIN 500 MG/1
500 CAPSULE ORAL 4 TIMES DAILY
Qty: 20 CAPSULE | Refills: 0 | Status: SHIPPED | OUTPATIENT
Start: 2022-01-19 | End: 2022-01-24

## 2022-01-19 RX ORDER — SODIUM CHLORIDE 0.9 % (FLUSH) 0.9 %
10 SYRINGE (ML) INJECTION PRN
Status: DISCONTINUED | OUTPATIENT
Start: 2022-01-19 | End: 2022-01-19 | Stop reason: HOSPADM

## 2022-01-19 RX ORDER — DEXAMETHASONE SODIUM PHOSPHATE 4 MG/ML
INJECTION, SOLUTION INTRA-ARTICULAR; INTRALESIONAL; INTRAMUSCULAR; INTRAVENOUS; SOFT TISSUE PRN
Status: DISCONTINUED | OUTPATIENT
Start: 2022-01-19 | End: 2022-01-19 | Stop reason: SDUPTHER

## 2022-01-19 RX ORDER — PROMETHAZINE HYDROCHLORIDE 12.5 MG/1
12.5 TABLET ORAL 4 TIMES DAILY PRN
Qty: 20 TABLET | Refills: 0 | Status: SHIPPED | OUTPATIENT
Start: 2022-01-19 | End: 2022-01-26

## 2022-01-19 RX ORDER — MIDAZOLAM HYDROCHLORIDE 1 MG/ML
INJECTION INTRAMUSCULAR; INTRAVENOUS PRN
Status: DISCONTINUED | OUTPATIENT
Start: 2022-01-19 | End: 2022-01-19 | Stop reason: SDUPTHER

## 2022-01-19 RX ORDER — ONDANSETRON 2 MG/ML
INJECTION INTRAMUSCULAR; INTRAVENOUS PRN
Status: DISCONTINUED | OUTPATIENT
Start: 2022-01-19 | End: 2022-01-19 | Stop reason: SDUPTHER

## 2022-01-19 RX ORDER — LIDOCAINE HYDROCHLORIDE 10 MG/ML
1 INJECTION, SOLUTION EPIDURAL; INFILTRATION; INTRACAUDAL; PERINEURAL
Status: DISCONTINUED | OUTPATIENT
Start: 2022-01-19 | End: 2022-01-19 | Stop reason: HOSPADM

## 2022-01-19 RX ORDER — SCOLOPAMINE TRANSDERMAL SYSTEM 1 MG/1
1 PATCH, EXTENDED RELEASE TRANSDERMAL ONCE
Status: DISCONTINUED | OUTPATIENT
Start: 2022-01-19 | End: 2022-01-19 | Stop reason: HOSPADM

## 2022-01-19 RX ORDER — OXYCODONE HYDROCHLORIDE 5 MG/1
5 TABLET ORAL
Status: COMPLETED | OUTPATIENT
Start: 2022-01-19 | End: 2022-01-19

## 2022-01-19 RX ORDER — OXYCODONE HYDROCHLORIDE AND ACETAMINOPHEN 5; 325 MG/1; MG/1
1 TABLET ORAL EVERY 6 HOURS PRN
Qty: 28 TABLET | Refills: 0 | Status: SHIPPED | OUTPATIENT
Start: 2022-01-19 | End: 2022-01-26

## 2022-01-19 RX ORDER — ACETAMINOPHEN 500 MG
1000 TABLET ORAL ONCE
Status: COMPLETED | OUTPATIENT
Start: 2022-01-19 | End: 2022-01-19

## 2022-01-19 RX ORDER — FENTANYL CITRATE 50 UG/ML
25 INJECTION, SOLUTION INTRAMUSCULAR; INTRAVENOUS ONCE
Status: COMPLETED | OUTPATIENT
Start: 2022-01-19 | End: 2022-01-19

## 2022-01-19 RX ORDER — FENTANYL CITRATE 50 UG/ML
25 INJECTION, SOLUTION INTRAMUSCULAR; INTRAVENOUS EVERY 5 MIN PRN
Status: DISCONTINUED | OUTPATIENT
Start: 2022-01-19 | End: 2022-01-19 | Stop reason: HOSPADM

## 2022-01-19 RX ORDER — LABETALOL HYDROCHLORIDE 5 MG/ML
5 INJECTION, SOLUTION INTRAVENOUS EVERY 10 MIN PRN
Status: DISCONTINUED | OUTPATIENT
Start: 2022-01-19 | End: 2022-01-19 | Stop reason: HOSPADM

## 2022-01-19 RX ORDER — FENTANYL CITRATE 50 UG/ML
INJECTION, SOLUTION INTRAMUSCULAR; INTRAVENOUS PRN
Status: DISCONTINUED | OUTPATIENT
Start: 2022-01-19 | End: 2022-01-19 | Stop reason: SDUPTHER

## 2022-01-19 RX ORDER — ONDANSETRON 2 MG/ML
4 INJECTION INTRAMUSCULAR; INTRAVENOUS
Status: DISCONTINUED | OUTPATIENT
Start: 2022-01-19 | End: 2022-01-19 | Stop reason: HOSPADM

## 2022-01-19 RX ORDER — PROPOFOL 10 MG/ML
INJECTION, EMULSION INTRAVENOUS PRN
Status: DISCONTINUED | OUTPATIENT
Start: 2022-01-19 | End: 2022-01-19 | Stop reason: SDUPTHER

## 2022-01-19 RX ADMIN — Medication 10 MG: at 16:19

## 2022-01-19 RX ADMIN — APREPITANT 40 MG: 40 CAPSULE ORAL at 13:47

## 2022-01-19 RX ADMIN — ONDANSETRON 4 MG: 2 INJECTION INTRAMUSCULAR; INTRAVENOUS at 16:19

## 2022-01-19 RX ADMIN — SODIUM CHLORIDE, POTASSIUM CHLORIDE, SODIUM LACTATE AND CALCIUM CHLORIDE: 600; 310; 30; 20 INJECTION, SOLUTION INTRAVENOUS at 13:38

## 2022-01-19 RX ADMIN — CEFAZOLIN 2000 MG: 10 INJECTION, POWDER, FOR SOLUTION INTRAVENOUS at 15:58

## 2022-01-19 RX ADMIN — Medication 10 MG: at 16:41

## 2022-01-19 RX ADMIN — SODIUM CHLORIDE, POTASSIUM CHLORIDE, SODIUM LACTATE AND CALCIUM CHLORIDE: 600; 310; 30; 20 INJECTION, SOLUTION INTRAVENOUS at 16:54

## 2022-01-19 RX ADMIN — Medication 20 MG: at 16:09

## 2022-01-19 RX ADMIN — FENTANYL CITRATE 50 MCG: 50 INJECTION, SOLUTION INTRAMUSCULAR; INTRAVENOUS at 16:12

## 2022-01-19 RX ADMIN — Medication 10 MG: at 16:22

## 2022-01-19 RX ADMIN — Medication 10 MG: at 16:28

## 2022-01-19 RX ADMIN — DEXAMETHASONE SODIUM PHOSPHATE 8 MG: 4 INJECTION, SOLUTION INTRAMUSCULAR; INTRAVENOUS at 16:19

## 2022-01-19 RX ADMIN — FENTANYL CITRATE 25 MCG: 50 INJECTION, SOLUTION INTRAMUSCULAR; INTRAVENOUS at 16:49

## 2022-01-19 RX ADMIN — HYDROMORPHONE HYDROCHLORIDE 0.5 MG: 2 INJECTION, SOLUTION INTRAMUSCULAR; INTRAVENOUS; SUBCUTANEOUS at 17:19

## 2022-01-19 RX ADMIN — MIDAZOLAM 2 MG: 1 INJECTION INTRAMUSCULAR; INTRAVENOUS at 16:05

## 2022-01-19 RX ADMIN — LIDOCAINE HYDROCHLORIDE 60 MG: 20 INJECTION, SOLUTION EPIDURAL; INFILTRATION; INTRACAUDAL; PERINEURAL at 16:12

## 2022-01-19 RX ADMIN — ACETAMINOPHEN 1000 MG: 500 TABLET ORAL at 13:45

## 2022-01-19 RX ADMIN — OXYCODONE 5 MG: 5 TABLET ORAL at 17:38

## 2022-01-19 RX ADMIN — PROPOFOL 180 MG: 10 INJECTION, EMULSION INTRAVENOUS at 16:12

## 2022-01-19 RX ADMIN — FENTANYL CITRATE 25 MCG: 50 INJECTION, SOLUTION INTRAMUSCULAR; INTRAVENOUS at 13:48

## 2022-01-19 ASSESSMENT — PULMONARY FUNCTION TESTS
PIF_VALUE: 3
PIF_VALUE: 1
PIF_VALUE: 1
PIF_VALUE: 15
PIF_VALUE: 15
PIF_VALUE: 2
PIF_VALUE: 6
PIF_VALUE: 1
PIF_VALUE: 3
PIF_VALUE: 1
PIF_VALUE: 0
PIF_VALUE: 15
PIF_VALUE: 3
PIF_VALUE: 2
PIF_VALUE: 15
PIF_VALUE: 0
PIF_VALUE: 15
PIF_VALUE: 3
PIF_VALUE: 16
PIF_VALUE: 15
PIF_VALUE: 16
PIF_VALUE: 15
PIF_VALUE: 3
PIF_VALUE: 16
PIF_VALUE: 15
PIF_VALUE: 2
PIF_VALUE: 3
PIF_VALUE: 2
PIF_VALUE: 15
PIF_VALUE: 3
PIF_VALUE: 1
PIF_VALUE: 16
PIF_VALUE: 15
PIF_VALUE: 2
PIF_VALUE: 15
PIF_VALUE: 15
PIF_VALUE: 1
PIF_VALUE: 16
PIF_VALUE: 1
PIF_VALUE: 3
PIF_VALUE: 3
PIF_VALUE: 15
PIF_VALUE: 4
PIF_VALUE: 5
PIF_VALUE: 15
PIF_VALUE: 3
PIF_VALUE: 15
PIF_VALUE: 4
PIF_VALUE: 16
PIF_VALUE: 15
PIF_VALUE: 10
PIF_VALUE: 4
PIF_VALUE: 16
PIF_VALUE: 15
PIF_VALUE: 3
PIF_VALUE: 15

## 2022-01-19 ASSESSMENT — PAIN DESCRIPTION - LOCATION
LOCATION: BREAST
LOCATION: BREAST

## 2022-01-19 ASSESSMENT — PAIN - FUNCTIONAL ASSESSMENT: PAIN_FUNCTIONAL_ASSESSMENT: 0-10

## 2022-01-19 ASSESSMENT — PAIN SCALES - GENERAL
PAINLEVEL_OUTOF10: 4
PAINLEVEL_OUTOF10: 5
PAINLEVEL_OUTOF10: 4
PAINLEVEL_OUTOF10: 6
PAINLEVEL_OUTOF10: 8

## 2022-01-19 ASSESSMENT — ENCOUNTER SYMPTOMS: SHORTNESS OF BREATH: 0

## 2022-01-19 ASSESSMENT — LIFESTYLE VARIABLES: SMOKING_STATUS: 0

## 2022-01-19 ASSESSMENT — PAIN DESCRIPTION - ORIENTATION
ORIENTATION: LEFT
ORIENTATION: LEFT

## 2022-01-19 ASSESSMENT — PAIN DESCRIPTION - PAIN TYPE: TYPE: SURGICAL PAIN

## 2022-01-19 NOTE — ANESTHESIA PRE PROCEDURE
Obstructive sleep apnea syndrome G47.33    Depression F32. A    Hypercholesterolemia E78.00    Postoperative pain G89.18       Past Medical History:        Diagnosis Date    Abnormal finding on EKG 3/21/2013    Normal stress myoview 3/21/13     Arthritis     widespread, former ballerina    Degenerative disc disease, cervical     cervical  disc fusion C4-C7; Dr. Jerardo Sharma    History of depression     History of DVT (deep vein thrombosis)     s/p arthroscopy, left    Migraines     Parotid mass 2017    S/p excision 2018. Pleomorphic adenoma. Dr. Sharee Rutherford Sleep apnea     no cpap.  [facility]    Thyroid disease        Past Surgical History:        Procedure Laterality Date    BREAST BIOPSY Left 2022    LEFT LOCALIZED TWO - SITE EXCISIONAL BREAST BIOPSY performed by Nara Meneses MD at [facility]      C4-7, decompressive anterior discectomies and foraminotomiesC4-7;Alexinski    HIP ARTHROPLASTY Right [id number]    RIGHT TOTAL HIP ARTHROPLASTY ANTERIOR APPROACH    HIP ARTHROPLASTY Left 2017    JOINT REPLACEMENT Left 2017    Dr. Shimon Hwang ARTHROSCOPY      bilateral, multiple    GIANNA BREAST LOC ADDITIONAL LESION LEFT Left 2022    GIANNA BREAST LOC ADDITIONAL LESION LEFT 2022 [facility]    GIANNA STEROTACTIC LOC BREAST BIOPSY LEFT Left 10/25/2021    GIANNA STEROTACTIC LOC BREAST BIOPSY LEFT 10/25/2021 [facility] [address]    NASAL SEPTUM SURGERY      PLANTAR FASCIA SURGERY  2006    left; Dr. Esmer Ware      left superficial parotidectomy, pleomorphic adenoma    TOTAL KNEE ARTHROPLASTY      left; Dr. iLnda Villalpando Right 12    Dr. Poonam Reeves       Social History:    Social History     Tobacco Use    Smoking status: Former Smoker     Packs/day: 0.50     Years: 3.00     Pack years: 1.50     Quit date: 1991     Years since quittin.2    Smokeless tobacco: Never Used   Substance Use Topics  Alcohol use: Yes     Alcohol/week: 0.8 - 1.7 standard drinks     Types: 1 - 2 Standard drinks or equivalent per week     Comment: 6/week                                Counseling given: Not Answered      Vital Signs (Current): There were no vitals filed for this visit. BP Readings from Last 3 Encounters:   01/19/22 (!) 137/98   01/12/22 128/88   01/12/22 (!) 146/58       NPO Status:                                                                                 BMI:   Wt Readings from Last 3 Encounters:   01/19/22 209 lb (94.8 kg)   01/12/22 212 lb (96.2 kg)   01/05/22 212 lb (96.2 kg)     There is no height or weight on file to calculate BMI.    CBC:   Lab Results   Component Value Date    WBC 9.6 08/20/2019    RBC 4.54 08/20/2019    HGB 13.2 08/20/2019    HCT 39.3 08/20/2019    MCV 86.6 08/20/2019    RDW 13.4 08/20/2019     08/20/2019       CMP:   Lab Results   Component Value Date     01/05/2022    K 3.7 01/05/2022     01/05/2022    CO2 23 01/05/2022    BUN 15 01/05/2022    CREATININE 0.7 01/05/2022    GFRAA >60 01/05/2022    GFRAA 104 03/15/2013    AGRATIO 2.0 08/20/2019    LABGLOM >60 01/05/2022    GLUCOSE 97 01/05/2022    PROT 6.8 08/20/2019    PROT 7.5 11/08/2011    CALCIUM 9.3 01/05/2022    BILITOT 0.7 08/20/2019    ALKPHOS 55 08/20/2019    AST 21 08/20/2019    ALT 26 08/20/2019       POC Tests: No results for input(s): POCGLU, POCNA, POCK, POCCL, POCBUN, POCHEMO, POCHCT in the last 72 hours.     Coags:   Lab Results   Component Value Date    PROTIME 10.8 06/13/2017    INR 0.96 06/13/2017    APTT 25.9 03/15/2013       HCG (If Applicable): No results found for: PREGTESTUR, PREGSERUM, HCG, HCGQUANT     ABGs: No results found for: PHART, PO2ART, OYQ4YWW, VOM3LCN, BEART, Y3AKBXRM     Type & Screen (If Applicable):  No results found for: LABABO, LABRH    Drug/Infectious Status (If Applicable):  No results found for: HIV, HEPCAB    COVID-19 Screening (If Applicable):   Lab Results   Component Value Date    COVID19 Not Detected 01/19/2022    COVID19 Not Detected 01/06/2022           Anesthesia Evaluation  Patient summary reviewed and Nursing notes reviewed   history of anesthetic complications: PONV. Airway: Mallampati: II  TM distance: <3 FB   Neck ROM: limited  Mouth opening: > = 3 FB Dental: normal exam         Pulmonary:   (+) sleep apnea:      (-) asthma, shortness of breath and not a current smoker                           Cardiovascular:    (+) hypertension:,     (-)  angina                Neuro/Psych:   (+) headaches: migraine headaches, psychiatric history: stable with treatment   (-) CVA           GI/Hepatic/Renal:        (-) GERD and liver disease       Endo/Other:    (+) hypothyroidism::., .    (-) diabetes mellitus               Abdominal:             Vascular:   + DVT, .  - PVD. Other Findings:               Anesthesia Plan      general     ASA 2       Induction: intravenous. MIPS: Postoperative opioids intended and Prophylactic antiemetics administered. Anesthetic plan and risks discussed with patient. Use of blood products discussed with patient whom. Plan discussed with CRNA.                   Stefani Puente MD   1/19/2022

## 2022-01-19 NOTE — PROGRESS NOTES
Awake alert & oriented. Respirations easy & symmetrical. Incision to lt breast clean & well-approximated with surgical glue. Fluffs & bra in place. States pain is relieved to tolerable level 5/10. Patient discharged per wheelchair to the care of responsible party. No additional questions voiced related to discharge information. Patient discharged with all personal items.

## 2022-01-19 NOTE — PROGRESS NOTES
Medicated for C/O headache. Pulse oximeter applied. Lights dimmed.  Pt aware that surgeon is running behind schedule

## 2022-01-19 NOTE — OP NOTE
Operative Note    Postoperative Note    Jayesh Slater  YOB: 1960  [id number]    Pre-operative Diagnosis: T is Nx left breast cancer    Post-operative Diagnosis: Same    Procedure: Re-excision of left breast medial margin(s), left tissue rearrangement procedure for area of 20 sq. cm. Anesthesia: General    Surgeons/Assistants: Douglas Meier  Assistant: Sarai Deshpande    Estimated Blood Loss: less than 50     Drains: none    Complications: None apparent at conclusion of procedure    Specimens: left breast tissue, medial margin    Findings: surgical bed as expected, see details below    Post-Op Condition: Stable    Disposition: to recovery room    Description of Procedure:   Ms. Nia Kemp is a 57 y.o. woman with a Tis Nx left breast cancer. She was originally diagnosed with atypical hyperplasia, but upon excision she was identified/upgraded to have multifocal noninvasive breast cancer, DCIS. At original excision a small foci appeared close to her medial margin however other margins were clear based on specimen pathology at that time. She has elected to proceed with left breast reexcision of margins. The indications for the planned procedure, along with the potential benefits and risks which include but are not limited to the risk of anesthesia, bleeding, infection, possible failed operation, possible need for additional surgery pending final pathologic assessment, lymphedema, sensation changes, and unappealing cosmetics were reviewed. All questions were answered and she agrees to proceed. Ms. Nia Kemp was met by me in the preoperative area. The surgical site was identified. The patient's surgical site was marked. Consent was obtained. The appropriate breast imaging was reviewed. She was brought to the operating room and placed supine with her arms extended on boards. She was appropriately positioned and padded. Compression stockings were placed.   Appropriate antibiotics were administered within 60 minutes of the incision. Breast imaging was available in the room. After induction of general anesthesia, the appropriate World Health Organization timeout procedure was performed. The left breast and axillary region, upper arm, and chest wall were prepped and draped in the normal sterile fashion. Attention was then turned to the breast for the margin reexcision. The skin and soft tissues over the proposed incision were infiltrated with local. A radial was made over the prior incision site. Using the Bovie cautery I took an additional 2 cm of additional breast tissue from the medial aspect of the surgical cavity. The most medial aspect was marked with a suture. No gross tumor was palpated. The wound was irrigated and hemostasis was obtained. The cavity's new margin was marked with surgical clips. In order to obtain the best cosmesis while closing the surgical cavity, I performed a tissue rearrangement. A separate tissue incision was made superiorly and inferiorly. Flaps were elevated and advanced for a total area of 20 square cm. Hemostasis was reassessed. The incision was closed in layers using a 3-0 vicryl stitch followed by a 4-0 monocryl subcuticular approximation. Skin glue was applied. The instrument, sponge, and needle counts were correct. Ms. Madalyn Strange was awakened and placed into a surgical bra. She was taken to the recovery room in stable condition. Her family was notified of intraoperative findings.         Electronically signed by Deana Blanc MD on 1/19/22 at 3:32 PM EST

## 2022-01-19 NOTE — PROGRESS NOTES
Discharge instructions reviewed with pt at bedside &  Nahum Kavya via telephone. Understanding verbalized.

## 2022-01-19 NOTE — INTERVAL H&P NOTE
H&P Update    The patient's most recent H&P, office notes, breast imaging, and pathology were reviewed. Patient examined and laterality marked. There has been no changes. We will plan to proceed with a left breast margin re-excision. The patient was counseled at length about the risks of gustabo Covid-19 during their perioperative period and any recovery window from their procedure. The patient was made aware that gustabo Covid-19  may worsen their prognosis for recovering from their procedure  and lend to a higher morbidity and/or mortality risk. All material risks, benefits, and reasonable alternatives including postponing the procedure were discussed. The patient does wish to proceed with the procedure at this time.         Janeth Martins MD

## 2022-01-20 ENCOUNTER — TELEPHONE (OUTPATIENT)
Dept: SURGERY | Age: 62
End: 2022-01-20

## 2022-01-20 NOTE — TELEPHONE ENCOUNTER
Prior authorization submitted at [facility], may take up to 24 hrs for results.  [id number]    Thanks, Hasmukh Valladares

## 2022-01-20 NOTE — TELEPHONE ENCOUNTER
Left a message to return my call. Need to verify if medication was picked up for oxycodone - Acetaminophen 5-325 mg . Or if she'd like me to do a prior authorization.        Thanks, Destiny Harrison

## 2022-01-20 NOTE — TELEPHONE ENCOUNTER
Patient called back and said she has already picked up all of her medications. Patient can be reached at [phone].

## 2022-01-24 ENCOUNTER — TELEPHONE (OUTPATIENT)
Dept: SURGERY | Age: 62
End: 2022-01-24

## 2022-01-24 NOTE — TELEPHONE ENCOUNTER
Left a message for Becky @ Dr. Lilly Tillman office to set up a consult for bilateral mastectomy with reconstruction.          Thanks, Destiny Harrison

## 2022-01-25 NOTE — TELEPHONE ENCOUNTER
Becky from Dr. Kayla Briseno office called. Patient is scheduled 1/31/22 @1859 with Dr. Renu Randle at the [facility] office for consult. Office notes , pathology and insurance faxed over. Becky will contact patient to let her know she will email her new patient forms and of appt date and time.      Thanks, Camille Peguero

## 2022-01-25 NOTE — TELEPHONE ENCOUNTER
Spoke to patient , her post op appt will be cancelled due to same day as consult with Dr. Millicent Montes. Patient has no concerns about surgery site is healing as expected. If she has any concerns will call our office.        Thanks, Gregorio Raphael

## 2022-01-26 ENCOUNTER — HOSPITAL ENCOUNTER (OUTPATIENT)
Dept: NUCLEAR MEDICINE | Age: 62
Discharge: HOME | End: 2022-01-26
Payer: COMMERCIAL

## 2022-01-26 DIAGNOSIS — Z96.652 TOTAL KNEE REPLACEMENT STATUS, LEFT: ICD-10-CM

## 2022-01-26 PROCEDURE — 3430000000 HC RX DIAGNOSTIC RADIOPHARMACEUTICAL: Performed by: PHYSICIAN ASSISTANT

## 2022-01-26 PROCEDURE — A9503 TC99M MEDRONATE: HCPCS | Performed by: PHYSICIAN ASSISTANT

## 2022-01-26 PROCEDURE — 78315 BONE IMAGING 3 PHASE: CPT

## 2022-01-26 RX ORDER — TC 99M MEDRONATE 20 MG/10ML
25 INJECTION, POWDER, LYOPHILIZED, FOR SOLUTION INTRAVENOUS
Status: COMPLETED | OUTPATIENT
Start: 2022-01-26 | End: 2022-01-26

## 2022-01-26 RX ADMIN — TC 99M MEDRONATE 25 MILLICURIE: 20 INJECTION, POWDER, LYOPHILIZED, FOR SOLUTION INTRAVENOUS at 12:22

## 2022-01-26 NOTE — TELEPHONE ENCOUNTER
[employer] called regarding intermittent  FMLA for upcoming appointments. Approval granted .       Thanks, Arthur Dee

## 2022-02-02 ENCOUNTER — TELEPHONE (OUTPATIENT)
Dept: SURGERY | Age: 62
End: 2022-02-02

## 2022-02-02 NOTE — TELEPHONE ENCOUNTER
Spoke to Becky @ Dr. Rebecca Worthington office. Patient is attentively scheduled bilateral mastectomy with reconstruction on 3/15/2022. Becky will verify time need for Dr. Rebecca Worthington attentive time is 1.5 hr Dr. Shakir Jiménez 3 hrs. Patient was notified surgery will start @7619 , arrive @2047. Encouraged to call me if she has any questions or concerns prior to surgery.

## 2022-03-02 ENCOUNTER — OFFICE VISIT (OUTPATIENT)
Dept: INTERNAL MEDICINE CLINIC | Age: 62
End: 2022-03-02
Payer: COMMERCIAL

## 2022-03-02 VITALS
HEART RATE: 74 BPM | DIASTOLIC BLOOD PRESSURE: 72 MMHG | OXYGEN SATURATION: 97 % | BODY MASS INDEX: 35.83 KG/M2 | WEIGHT: 222 LBS | SYSTOLIC BLOOD PRESSURE: 134 MMHG

## 2022-03-02 DIAGNOSIS — Z01.818 PRE-OP EVALUATION: ICD-10-CM

## 2022-03-02 DIAGNOSIS — D05.12 DUCTAL CARCINOMA IN SITU (DCIS) OF LEFT BREAST: Primary | ICD-10-CM

## 2022-03-02 DIAGNOSIS — I10 ESSENTIAL HYPERTENSION: Chronic | ICD-10-CM

## 2022-03-02 PROCEDURE — 90750 HZV VACC RECOMBINANT IM: CPT | Performed by: INTERNAL MEDICINE

## 2022-03-02 PROCEDURE — 99214 OFFICE O/P EST MOD 30 MIN: CPT | Performed by: INTERNAL MEDICINE

## 2022-03-02 PROCEDURE — 90471 IMMUNIZATION ADMIN: CPT | Performed by: INTERNAL MEDICINE

## 2022-03-02 RX ORDER — MELOXICAM 15 MG/1
15 TABLET ORAL DAILY
COMMUNITY
Start: 2022-02-15

## 2022-03-02 RX ORDER — GABAPENTIN 300 MG/1
600 CAPSULE ORAL NIGHTLY
COMMUNITY
Start: 2022-02-15 | End: 2022-06-14

## 2022-03-02 ASSESSMENT — ENCOUNTER SYMPTOMS
SHORTNESS OF BREATH: 0
COUGH: 0

## 2022-03-02 NOTE — PROGRESS NOTES
[facility]- Internal Medicine  Preoperative Consultation      3/2/2022    Josafat Mena  :  1960    Chief Complaint   Patient presents with    Pre-op Exam     double mastectomy, 3/15/22, Dr. Silviano Downs and Soco Isaacs, [facility]      HPI:  This patient presents to the office today for a preoperative consultation at the request of Jonyn Capellan and Re Licea to assess stability of patient's medical condition(s) listed below. She will be having bilateral mastectomy and initial reconstruction. [facility]. Patient had recent lumpectomy for DCIS, and margins were not clear. After discussion with her breast surgeon, has opted for the bilateral mastectomy. Mentions new issues with left knee, s/p replacement 12 years ago. Seeing Dr. Gricelda Orlando for this . On Mobic and gabapentin. Review of Systems   Constitutional: Negative for chills and fever. Respiratory: Negative for cough and shortness of breath. Cardiovascular: Negative for chest pain and palpitations. Skin: Negative for rash. Hematological: Does not bruise/bleed easily. Known anesthesia problems: nausea, post anesthesia, but no allergic reaction. Bleeding risk: No recent or remote history of abnormal bleeding  Personal or FH of DVT/PE: Yes - s/p knee arthroscopy    Recent steroid use: no  Exercise tolerance: >4 METS    Past Medical History:   Diagnosis Date    Abnormal finding on EKG 3/21/2013    Normal stress myoview 3/21/13     Arthritis     widespread, former ballerina    Degenerative disc disease, cervical     cervical  disc fusion C4-C7; Dr. Rolo Chapin    History of depression     History of DVT (deep vein thrombosis)     s/p arthroscopy, left    Migraines     Parotid mass 2017    S/p excision 2018. Pleomorphic adenoma. Dr. Flor Zhou Sleep apnea     no cpap.  [facility]    Thyroid disease      Past Surgical History:   Procedure Laterality Date    BREAST BIOPSY Left 2022    LEFT LOCALIZED TWO - SITE EXCISIONAL BREAST BIOPSY performed by Radha Jc MD at [facility] Left 2022    LEFT BREAST MARGIN RE-EXCISION (05414-46) performed by Radha Jc MD at [address]      C4-7, decompressive anterior discectomies and foraminotomiesC4-7;Oren    HIP ARTHROPLASTY Right [id number]    RIGHT TOTAL HIP ARTHROPLASTY ANTERIOR APPROACH    HIP ARTHROPLASTY Left 2017    JOINT REPLACEMENT Left 2017    Dr. Rodriguez Corporal ARTHROSCOPY      bilateral, multiple    GIANNA BREAST LOC ADDITIONAL LESION LEFT Left 2022    GIANNA BREAST LOC ADDITIONAL LESION LEFT 2022 [facility] [address]    GIANNA STEROTACTIC LOC BREAST BIOPSY LEFT Left 10/25/2021    GIANNA STEROTACTIC LOC BREAST BIOPSY LEFT 10/25/2021 [facility] [address]    NASAL SEPTUM SURGERY      PLANTAR FASCIA SURGERY  2006    left; Dr. Fall Daija      left superficial parotidectomy, pleomorphic adenoma    TOTAL KNEE ARTHROPLASTY      left; Dr. Ellis Oneill Right 12    Dr. Alejandra Lew     Family History   Problem Relation Age of Onset    Heart Attack Mother         68    Heart Failure Mother     Breast Cancer Mother     Hypertension Father     Cancer Father         fibrohistiosarcoma    Deep Vein Thrombosis Father     Atrial Fibrillation Sister     Hypertension Sister     Breast Cancer Sister     BRCA 2 Negative Sister     BRCA 1 Negative Sister     Sleep Apnea Sister     Deep Vein Thrombosis Sister     Breast Cancer Maternal Grandmother      Social History     Tobacco Use    Smoking status: Former Smoker     Packs/day: 0.50     Years: 3.00     Pack years: 1.50     Quit date: 1991     Years since quittin.3    Smokeless tobacco: Never Used   Vaping Use    Vaping Use: Not on file   Substance Use Topics    Alcohol use:  Yes     Alcohol/week: 0.8 - 1.7 standard drinks     Types: 1 - 2 Standard drinks or equivalent per week     Comment: 6/week    Drug use: No     No Known Allergies    Outpatient Medications Marked as Taking for the 3/2/22 encounter (Office Visit) with Nia Jerome MD   Medication Sig Dispense Refill    levothyroxine (SYNTHROID) 75 MCG tablet TAKE ONE TABLET BY MOUTH DAILY ON AN EMPTY STOMACH TAKE WITH WATER AND WAIT 30 MINUTES BEFORE EATING OR TAKING OTHER MEDS, 90 tablet 0    amLODIPine (NORVASC) 10 MG tablet Take 1 tablet by mouth daily 90 tablet 0       Physical Exam:  /72   Pulse 74   Wt 222 lb (100.7 kg)   SpO2 97%   BMI 35.83 kg/m²   Physical Exam  Constitutional:       Appearance: Normal appearance. Eyes:      Pupils: Pupils are equal, round, and reactive to light. Neck:      Vascular: No carotid bruit. Cardiovascular:      Rate and Rhythm: Normal rate and regular rhythm. Heart sounds: No murmur heard. Pulmonary:      Effort: Pulmonary effort is normal.      Breath sounds: Normal breath sounds. Abdominal:      General: Bowel sounds are normal.      Palpations: Abdomen is soft. Tenderness: There is no abdominal tenderness. Musculoskeletal:      Right lower leg: No edema. Left lower leg: No edema. Skin:     Findings: No rash. Neurological:      General: No focal deficit present. Mental Status: She is oriented to person, place, and time. EKG Interpretation:  (done 1/5/2022)normal sinus rhythm, poor R wave progression. Unchanged from prior EKGs. Lab Review: Renal and TSH to be drawn today     ASSESSMENT/PLAN:     Ductal carcinoma in situ (DCIS) of left breast  Assessment & Plan:  Status post lumpectomy with positive margins. Will proceed with bilateral mastectomy. Pre-op evaluation  Comments:  Patient is medically stable with no contraindication to the planned procedure. Essential hypertension  Assessment & Plan:  Well-controlled. Instructed to take amlodipine with a sip of water on morning of surgery.      Pt advised to avoid

## 2022-03-02 NOTE — PATIENT INSTRUCTIONS
Test and Protect Website for [address] [place] [address]  [email or web address]

## 2022-03-02 NOTE — PROGRESS NOTES
[facility]- Internal Medicine  Preoperative Consultation      3/2/2022    Margarito Clifford  :  1960    Chief Complaint   Patient presents with    Pre-op Exam     double mastectomy, 3/15/22, Dr. Lionel Camp and Federico Cat, [facility]        HPI:  This patient presents to the office today for a preoperative consultation at the request of Jonny Capellan and Billie Gill to assess stability of patient's medical condition(s) listed below. She will be having bilateral mastectomy and initial reconstruction. [facility]. Mentions new issues with left knee. Seeing Dr. Marisa Muñoz for this . On Mobic and gabapentin. Review of Systems  Known anesthesia problems: {ANESTHESIA PROBLEMS:}   Bleeding risk: {BLEEDING RISK:}  Personal or FH of DVT/PE: {NO/YES:[id number]::\"No\"}    Recent steroid use: {YES***/NO:60}  Exercise tolerance: ***  Can take care of self, such as eat, dress, or use the toilet (1 MET)  Can walk up a flight of steps or a hill or walk on level ground at 3 to 4 mph (4 METs)  Can do heavy work around the house, such as scrubbing floors or lifting or moving heavy furniture, or climb two flights of stairs (between 4 and 10 METs)  Can participate in strenuous sports such as swimming, singles tennis, football, basketball, and skiing (>10 METs)    Past Medical History:   Diagnosis Date    Abnormal finding on EKG 3/21/2013    Normal stress myoview 3/21/13     Arthritis     widespread, former ballerina    Degenerative disc disease, cervical     cervical  disc fusion C4-C7; Dr. Robert Collins    History of depression     History of DVT (deep vein thrombosis)     s/p arthroscopy, left    Migraines     Parotid mass 2017    S/p excision 2018. Pleomorphic adenoma. Dr. Bran Angela Sleep apnea     no cpap.  [facility]    Thyroid disease      Past Surgical History:   Procedure Laterality Date    BREAST BIOPSY Left 2022    LEFT LOCALIZED TWO - SITE EXCISIONAL BREAST BIOPSY use: No     No Known Allergies    Outpatient Medications Marked as Taking for the 3/2/22 encounter (Office Visit) with Lena Camp MD   Medication Sig Dispense Refill    levothyroxine (SYNTHROID) 75 MCG tablet TAKE ONE TABLET BY MOUTH DAILY ON AN EMPTY STOMACH TAKE WITH WATER AND WAIT 30 MINUTES BEFORE EATING OR TAKING OTHER MEDS, 90 tablet 0    amLODIPine (NORVASC) 10 MG tablet Take 1 tablet by mouth daily 90 tablet 0       Physical Exam:  /72   Pulse 74   Wt 222 lb (100.7 kg)   SpO2 97%   BMI 35.83 kg/m²   Physical Exam  EKG Interpretation:  {ekg findings:15062}. Lab Review: {Recent WMQS:40831::\"FOR applicable\"}     ASSESSMENT/PLAN:   Essential hypertension        Pt advised to avoid NSAID's, OTC vitamin supplements and fish oil 1 week prior to procedure.       Lena Camp MD

## 2022-03-14 ENCOUNTER — ANESTHESIA EVENT (OUTPATIENT)
Dept: OPERATING ROOM | Age: 62
End: 2022-03-14
Payer: COMMERCIAL

## 2022-03-14 NOTE — PROGRESS NOTES
Patient not reached. Preop instructions left on voice mail. Number_______________    -Date_3/15/2022______time__0815_____arrival___0645_________  -Nothing to eat or drink after midnight  -Responsible adult 25 or older to stay on site while you are here and drive you home and stay with you after  -Follow any instructions your doctors office has given you  -Bring a complete list of all your medications and supplements  -If you normally take the following medications in the morning please do so with a small    sip of water-heart,blood pressure,seizure,breathing or thyroid-avoid water pilll Do not take blood pressure medications ending in \"dieter\" or \"pril\" the AM of surgery or the kenneth prior  -You may use your inhalers  -Take half of your normal dose of any long acting insulins the night before-do not take    any diabetic medications in the morning  -Follow your doctors instructions regarding blood thinners  -Any questions call your surgeons office      COVID TEST        __x_ Covid test to be done 3-5 days prior to scheduled surgery -patient aware they are REQUIRED to bring a copy of the negative test result DOS-if they receive a positive result to notify their surgeon          If known-indicate where patient is getting test done          ________________________________________    ___ Rapid - DOS    ___ Other _____________________________      [facility](subject to change)    There is a one visitor policy at [facility] for all surgeries and endoscopies. Whether the visitor can stay or will be asked to wait in the car will depend on the current policy and if social distancing can be maintained. The policy is subject to change at any time. Please make sure the visitor has a cell phone that is on,charged and able to accept calls, as this may be the way that the staff communicates with them. Pain management is NO VISITOR policyThe patients ride is expected to remain in the car with a cell phone for communication. If the ride is leaving the hospital grounds please make sure they are back in time for pickup. Have the patient inform the staff on arrival what their rides plans are while the patient is in the facility. At the MAIN there is one visitor allowed. Please note that the visitor policy is subject to change.

## 2022-03-15 ENCOUNTER — TELEPHONE (OUTPATIENT)
Dept: SURGERY | Age: 62
End: 2022-03-15

## 2022-03-15 ENCOUNTER — HOSPITAL ENCOUNTER (OUTPATIENT)
Dept: NUCLEAR MEDICINE | Age: 62
Setting detail: OUTPATIENT SURGERY
Discharge: HOME | End: 2022-03-15
Attending: SURGERY
Payer: COMMERCIAL

## 2022-03-15 ENCOUNTER — ANESTHESIA (OUTPATIENT)
Dept: OPERATING ROOM | Age: 62
End: 2022-03-15
Payer: COMMERCIAL

## 2022-03-15 ENCOUNTER — HOSPITAL ENCOUNTER (OUTPATIENT)
Age: 62
Setting detail: OUTPATIENT SURGERY
Discharge: HOME | End: 2022-03-15
Attending: SURGERY | Admitting: SURGERY
Payer: COMMERCIAL

## 2022-03-15 VITALS
WEIGHT: 222 LBS | TEMPERATURE: 97 F | OXYGEN SATURATION: 94 % | RESPIRATION RATE: 13 BRPM | SYSTOLIC BLOOD PRESSURE: 136 MMHG | BODY MASS INDEX: 35.68 KG/M2 | DIASTOLIC BLOOD PRESSURE: 82 MMHG | HEIGHT: 66 IN | HEART RATE: 81 BPM

## 2022-03-15 VITALS
SYSTOLIC BLOOD PRESSURE: 113 MMHG | RESPIRATION RATE: 9 BRPM | OXYGEN SATURATION: 99 % | DIASTOLIC BLOOD PRESSURE: 69 MMHG | TEMPERATURE: 96.3 F

## 2022-03-15 DIAGNOSIS — D05.12 DUCTAL CARCINOMA IN SITU (DCIS) OF LEFT BREAST: ICD-10-CM

## 2022-03-15 DIAGNOSIS — D05.12 DUCTAL CARCINOMA IN SITU OF LEFT BREAST: Primary | ICD-10-CM

## 2022-03-15 LAB
ABO/RH: NORMAL
ANTIBODY SCREEN: NORMAL
SARS-COV-2, NAAT: NOT DETECTED

## 2022-03-15 PROCEDURE — 6360000002 HC RX W HCPCS: Performed by: SURGERY

## 2022-03-15 PROCEDURE — A9520 TC99 TILMANOCEPT DIAG 0.5MCI: HCPCS | Performed by: SURGERY

## 2022-03-15 PROCEDURE — 6370000000 HC RX 637 (ALT 250 FOR IP): Performed by: ANESTHESIOLOGY

## 2022-03-15 PROCEDURE — 3600000014 HC SURGERY LEVEL 4 ADDTL 15MIN: Performed by: SURGERY

## 2022-03-15 PROCEDURE — 86850 RBC ANTIBODY SCREEN: CPT

## 2022-03-15 PROCEDURE — 2709999900 HC NON-CHARGEABLE SUPPLY: Performed by: SURGERY

## 2022-03-15 PROCEDURE — 88342 IMHCHEM/IMCYTCHM 1ST ANTB: CPT

## 2022-03-15 PROCEDURE — 19303 MAST SIMPLE COMPLETE: CPT | Performed by: SURGERY

## 2022-03-15 PROCEDURE — C9290 INJ, BUPIVACAINE LIPOSOME: HCPCS | Performed by: SURGERY

## 2022-03-15 PROCEDURE — 86900 BLOOD TYPING SEROLOGIC ABO: CPT

## 2022-03-15 PROCEDURE — 88307 TISSUE EXAM BY PATHOLOGIST: CPT

## 2022-03-15 PROCEDURE — 86901 BLOOD TYPING SEROLOGIC RH(D): CPT

## 2022-03-15 PROCEDURE — 2580000003 HC RX 258: Performed by: ANESTHESIOLOGY

## 2022-03-15 PROCEDURE — 88309 TISSUE EXAM BY PATHOLOGIST: CPT

## 2022-03-15 PROCEDURE — 7100000000 HC PACU RECOVERY - FIRST 15 MIN: Performed by: SURGERY

## 2022-03-15 PROCEDURE — 2500000003 HC RX 250 WO HCPCS: Performed by: ANESTHESIOLOGY

## 2022-03-15 PROCEDURE — C1789 PROSTHESIS, BREAST, IMP: HCPCS | Performed by: SURGERY

## 2022-03-15 PROCEDURE — 6360000002 HC RX W HCPCS: Performed by: ANESTHESIOLOGY

## 2022-03-15 PROCEDURE — 3700000000 HC ANESTHESIA ATTENDED CARE: Performed by: SURGERY

## 2022-03-15 PROCEDURE — 87635 SARS-COV-2 COVID-19 AMP PRB: CPT

## 2022-03-15 PROCEDURE — 3430000000 HC RX DIAGNOSTIC RADIOPHARMACEUTICAL: Performed by: SURGERY

## 2022-03-15 PROCEDURE — 7100000010 HC PHASE II RECOVERY - FIRST 15 MIN: Performed by: SURGERY

## 2022-03-15 PROCEDURE — 2580000003 HC RX 258: Performed by: SURGERY

## 2022-03-15 PROCEDURE — 3600000004 HC SURGERY LEVEL 4 BASE: Performed by: SURGERY

## 2022-03-15 PROCEDURE — 38525 BIOPSY/REMOVAL LYMPH NODES: CPT | Performed by: SURGERY

## 2022-03-15 PROCEDURE — 38792 RA TRACER ID OF SENTINL NODE: CPT

## 2022-03-15 PROCEDURE — 3700000001 HC ADD 15 MINUTES (ANESTHESIA): Performed by: SURGERY

## 2022-03-15 PROCEDURE — C2626 INFUSION PUMP, NON-PROG,TEMP: HCPCS | Performed by: SURGERY

## 2022-03-15 PROCEDURE — 38900 IO MAP OF SENT LYMPH NODE: CPT | Performed by: SURGERY

## 2022-03-15 PROCEDURE — 7100000001 HC PACU RECOVERY - ADDTL 15 MIN: Performed by: SURGERY

## 2022-03-15 PROCEDURE — 38792 RA TRACER ID OF SENTINL NODE: CPT | Performed by: SURGERY

## 2022-03-15 PROCEDURE — 7100000011 HC PHASE II RECOVERY - ADDTL 15 MIN: Performed by: SURGERY

## 2022-03-15 RX ORDER — SODIUM CHLORIDE 9 MG/ML
INJECTION, SOLUTION INTRAVENOUS CONTINUOUS PRN
Status: DISCONTINUED | OUTPATIENT
Start: 2022-03-15 | End: 2022-03-15 | Stop reason: SDUPTHER

## 2022-03-15 RX ORDER — SUCCINYLCHOLINE CHLORIDE 20 MG/ML
INJECTION INTRAMUSCULAR; INTRAVENOUS PRN
Status: DISCONTINUED | OUTPATIENT
Start: 2022-03-15 | End: 2022-03-15 | Stop reason: SDUPTHER

## 2022-03-15 RX ORDER — EPHEDRINE SULFATE/0.9% NACL/PF 50 MG/5 ML
SYRINGE (ML) INTRAVENOUS PRN
Status: DISCONTINUED | OUTPATIENT
Start: 2022-03-15 | End: 2022-03-15 | Stop reason: SDUPTHER

## 2022-03-15 RX ORDER — LIDOCAINE HYDROCHLORIDE 20 MG/ML
INJECTION, SOLUTION EPIDURAL; INFILTRATION; INTRACAUDAL; PERINEURAL PRN
Status: DISCONTINUED | OUTPATIENT
Start: 2022-03-15 | End: 2022-03-15 | Stop reason: SDUPTHER

## 2022-03-15 RX ORDER — SCOLOPAMINE TRANSDERMAL SYSTEM 1 MG/1
1 PATCH, EXTENDED RELEASE TRANSDERMAL ONCE
Status: DISCONTINUED | OUTPATIENT
Start: 2022-03-15 | End: 2022-03-15 | Stop reason: HOSPADM

## 2022-03-15 RX ORDER — HYDROMORPHONE HCL 110MG/55ML
0.5 PATIENT CONTROLLED ANALGESIA SYRINGE INTRAVENOUS EVERY 5 MIN PRN
Status: DISCONTINUED | OUTPATIENT
Start: 2022-03-15 | End: 2022-03-15 | Stop reason: HOSPADM

## 2022-03-15 RX ORDER — SODIUM CHLORIDE, SODIUM LACTATE, POTASSIUM CHLORIDE, CALCIUM CHLORIDE 600; 310; 30; 20 MG/100ML; MG/100ML; MG/100ML; MG/100ML
INJECTION, SOLUTION INTRAVENOUS CONTINUOUS
Status: DISCONTINUED | OUTPATIENT
Start: 2022-03-15 | End: 2022-03-15 | Stop reason: HOSPADM

## 2022-03-15 RX ORDER — ROCURONIUM BROMIDE 10 MG/ML
INJECTION, SOLUTION INTRAVENOUS PRN
Status: DISCONTINUED | OUTPATIENT
Start: 2022-03-15 | End: 2022-03-15 | Stop reason: SDUPTHER

## 2022-03-15 RX ORDER — APREPITANT 40 MG/1
40 CAPSULE ORAL ONCE
Status: COMPLETED | OUTPATIENT
Start: 2022-03-15 | End: 2022-03-15

## 2022-03-15 RX ORDER — DEXMEDETOMIDINE HYDROCHLORIDE 100 UG/ML
INJECTION, SOLUTION INTRAVENOUS PRN
Status: DISCONTINUED | OUTPATIENT
Start: 2022-03-15 | End: 2022-03-15 | Stop reason: SDUPTHER

## 2022-03-15 RX ORDER — LIDOCAINE HYDROCHLORIDE 10 MG/ML
1 INJECTION, SOLUTION EPIDURAL; INFILTRATION; INTRACAUDAL; PERINEURAL
Status: DISCONTINUED | OUTPATIENT
Start: 2022-03-15 | End: 2022-03-15 | Stop reason: HOSPADM

## 2022-03-15 RX ORDER — PROPOFOL 10 MG/ML
INJECTION, EMULSION INTRAVENOUS PRN
Status: DISCONTINUED | OUTPATIENT
Start: 2022-03-15 | End: 2022-03-15 | Stop reason: SDUPTHER

## 2022-03-15 RX ORDER — MIDAZOLAM HYDROCHLORIDE 1 MG/ML
INJECTION INTRAMUSCULAR; INTRAVENOUS PRN
Status: DISCONTINUED | OUTPATIENT
Start: 2022-03-15 | End: 2022-03-15 | Stop reason: SDUPTHER

## 2022-03-15 RX ORDER — SODIUM CHLORIDE 9 MG/ML
INJECTION, SOLUTION INTRAVENOUS CONTINUOUS
Status: DISCONTINUED | OUTPATIENT
Start: 2022-03-15 | End: 2022-03-15 | Stop reason: HOSPADM

## 2022-03-15 RX ORDER — HYDROMORPHONE HCL 110MG/55ML
0.25 PATIENT CONTROLLED ANALGESIA SYRINGE INTRAVENOUS EVERY 5 MIN PRN
Status: DISCONTINUED | OUTPATIENT
Start: 2022-03-15 | End: 2022-03-15 | Stop reason: HOSPADM

## 2022-03-15 RX ORDER — LABETALOL HYDROCHLORIDE 5 MG/ML
5 INJECTION, SOLUTION INTRAVENOUS
Status: DISCONTINUED | OUTPATIENT
Start: 2022-03-15 | End: 2022-03-15 | Stop reason: HOSPADM

## 2022-03-15 RX ORDER — KETAMINE HCL IN NACL, ISO-OSM 100MG/10ML
SYRINGE (ML) INJECTION PRN
Status: DISCONTINUED | OUTPATIENT
Start: 2022-03-15 | End: 2022-03-15 | Stop reason: SDUPTHER

## 2022-03-15 RX ORDER — GLYCOPYRROLATE 1 MG/5 ML
SYRINGE (ML) INTRAVENOUS PRN
Status: DISCONTINUED | OUTPATIENT
Start: 2022-03-15 | End: 2022-03-15 | Stop reason: SDUPTHER

## 2022-03-15 RX ORDER — OXYCODONE HYDROCHLORIDE 5 MG/1
5 TABLET ORAL
Status: COMPLETED | OUTPATIENT
Start: 2022-03-15 | End: 2022-03-15

## 2022-03-15 RX ORDER — DEXAMETHASONE SODIUM PHOSPHATE 4 MG/ML
INJECTION, SOLUTION INTRA-ARTICULAR; INTRALESIONAL; INTRAMUSCULAR; INTRAVENOUS; SOFT TISSUE PRN
Status: DISCONTINUED | OUTPATIENT
Start: 2022-03-15 | End: 2022-03-15 | Stop reason: SDUPTHER

## 2022-03-15 RX ORDER — ACETAMINOPHEN 325 MG/1
650 TABLET ORAL ONCE
Status: COMPLETED | OUTPATIENT
Start: 2022-03-15 | End: 2022-03-15

## 2022-03-15 RX ORDER — FENTANYL CITRATE 50 UG/ML
INJECTION, SOLUTION INTRAMUSCULAR; INTRAVENOUS PRN
Status: DISCONTINUED | OUTPATIENT
Start: 2022-03-15 | End: 2022-03-15 | Stop reason: SDUPTHER

## 2022-03-15 RX ORDER — BUPIVACAINE HYDROCHLORIDE 2.5 MG/ML
INJECTION, SOLUTION INFILTRATION; PERINEURAL
Status: COMPLETED | OUTPATIENT
Start: 2022-03-15 | End: 2022-03-15

## 2022-03-15 RX ORDER — ONDANSETRON 2 MG/ML
INJECTION INTRAMUSCULAR; INTRAVENOUS PRN
Status: DISCONTINUED | OUTPATIENT
Start: 2022-03-15 | End: 2022-03-15 | Stop reason: SDUPTHER

## 2022-03-15 RX ORDER — ONDANSETRON 2 MG/ML
4 INJECTION INTRAMUSCULAR; INTRAVENOUS
Status: DISCONTINUED | OUTPATIENT
Start: 2022-03-15 | End: 2022-03-15 | Stop reason: HOSPADM

## 2022-03-15 RX ADMIN — CEFAZOLIN 2000 MG: 10 INJECTION, POWDER, FOR SOLUTION INTRAVENOUS at 08:26

## 2022-03-15 RX ADMIN — PHENYLEPHRINE HYDROCHLORIDE 100 MCG: 10 INJECTION INTRAVENOUS at 09:05

## 2022-03-15 RX ADMIN — FENTANYL CITRATE 50 MCG: 50 INJECTION, SOLUTION INTRAMUSCULAR; INTRAVENOUS at 10:35

## 2022-03-15 RX ADMIN — PROPOFOL 140 MG: 10 INJECTION, EMULSION INTRAVENOUS at 08:36

## 2022-03-15 RX ADMIN — FAMOTIDINE 20 MG: 10 INJECTION INTRAVENOUS at 09:11

## 2022-03-15 RX ADMIN — Medication 10 MG: at 08:57

## 2022-03-15 RX ADMIN — Medication 10 MG: at 10:07

## 2022-03-15 RX ADMIN — Medication 10 MG: at 08:50

## 2022-03-15 RX ADMIN — SUCCINYLCHOLINE CHLORIDE 120 MG: 20 INJECTION, SOLUTION INTRAMUSCULAR; INTRAVENOUS at 08:37

## 2022-03-15 RX ADMIN — DEXMEDETOMIDINE HYDROCHLORIDE 5 MCG: 100 INJECTION, SOLUTION INTRAVENOUS at 09:15

## 2022-03-15 RX ADMIN — DEXMEDETOMIDINE HYDROCHLORIDE 5 MCG: 100 INJECTION, SOLUTION INTRAVENOUS at 11:16

## 2022-03-15 RX ADMIN — TILMANOCEPT 0.54 MILLICURIE: KIT at 10:52

## 2022-03-15 RX ADMIN — SODIUM CHLORIDE: 9 INJECTION, SOLUTION INTRAVENOUS at 11:35

## 2022-03-15 RX ADMIN — ACETAMINOPHEN 325MG 650 MG: 325 TABLET ORAL at 07:48

## 2022-03-15 RX ADMIN — HYDROMORPHONE HYDROCHLORIDE 0.25 MG: 2 INJECTION, SOLUTION INTRAMUSCULAR; INTRAVENOUS; SUBCUTANEOUS at 13:40

## 2022-03-15 RX ADMIN — Medication 5 MG: at 10:12

## 2022-03-15 RX ADMIN — Medication 10 MG: at 10:29

## 2022-03-15 RX ADMIN — Medication 5 MG: at 08:45

## 2022-03-15 RX ADMIN — Medication 10 MG: at 09:48

## 2022-03-15 RX ADMIN — Medication 10 MG: at 09:42

## 2022-03-15 RX ADMIN — SUGAMMADEX 200 MG: 100 INJECTION, SOLUTION INTRAVENOUS at 12:37

## 2022-03-15 RX ADMIN — ROCURONIUM BROMIDE 10 MG: 10 INJECTION, SOLUTION INTRAVENOUS at 09:34

## 2022-03-15 RX ADMIN — DEXMEDETOMIDINE HYDROCHLORIDE 5 MCG: 100 INJECTION, SOLUTION INTRAVENOUS at 12:08

## 2022-03-15 RX ADMIN — HYDROMORPHONE HYDROCHLORIDE 0.25 MG: 2 INJECTION, SOLUTION INTRAMUSCULAR; INTRAVENOUS; SUBCUTANEOUS at 13:53

## 2022-03-15 RX ADMIN — ROCURONIUM BROMIDE 35 MG: 10 INJECTION, SOLUTION INTRAVENOUS at 08:46

## 2022-03-15 RX ADMIN — MIDAZOLAM 2 MG: 1 INJECTION INTRAMUSCULAR; INTRAVENOUS at 08:24

## 2022-03-15 RX ADMIN — DEXMEDETOMIDINE HYDROCHLORIDE 5 MCG: 100 INJECTION, SOLUTION INTRAVENOUS at 10:14

## 2022-03-15 RX ADMIN — Medication 10 MG: at 10:39

## 2022-03-15 RX ADMIN — ONDANSETRON 4 MG: 2 INJECTION INTRAMUSCULAR; INTRAVENOUS at 12:25

## 2022-03-15 RX ADMIN — LIDOCAINE HYDROCHLORIDE 60 MG: 20 INJECTION, SOLUTION EPIDURAL; INFILTRATION; INTRACAUDAL; PERINEURAL at 08:34

## 2022-03-15 RX ADMIN — ROCURONIUM BROMIDE 10 MG: 10 INJECTION, SOLUTION INTRAVENOUS at 10:35

## 2022-03-15 RX ADMIN — SODIUM CHLORIDE, POTASSIUM CHLORIDE, SODIUM LACTATE AND CALCIUM CHLORIDE: 600; 310; 30; 20 INJECTION, SOLUTION INTRAVENOUS at 09:52

## 2022-03-15 RX ADMIN — PHENYLEPHRINE HYDROCHLORIDE 50 MCG: 10 INJECTION INTRAVENOUS at 12:15

## 2022-03-15 RX ADMIN — SODIUM CHLORIDE, POTASSIUM CHLORIDE, SODIUM LACTATE AND CALCIUM CHLORIDE: 600; 310; 30; 20 INJECTION, SOLUTION INTRAVENOUS at 07:39

## 2022-03-15 RX ADMIN — PHENYLEPHRINE HYDROCHLORIDE 50 MCG: 10 INJECTION INTRAVENOUS at 11:51

## 2022-03-15 RX ADMIN — DEXAMETHASONE SODIUM PHOSPHATE 8 MG: 4 INJECTION, SOLUTION INTRAMUSCULAR; INTRAVENOUS at 08:49

## 2022-03-15 RX ADMIN — FENTANYL CITRATE 50 MCG: 50 INJECTION, SOLUTION INTRAMUSCULAR; INTRAVENOUS at 08:30

## 2022-03-15 RX ADMIN — APREPITANT 40 MG: 40 CAPSULE ORAL at 07:52

## 2022-03-15 RX ADMIN — OXYCODONE 5 MG: 5 TABLET ORAL at 15:42

## 2022-03-15 RX ADMIN — Medication 0.2 MG: at 10:26

## 2022-03-15 RX ADMIN — ROCURONIUM BROMIDE 5 MG: 10 INJECTION, SOLUTION INTRAVENOUS at 08:36

## 2022-03-15 ASSESSMENT — PAIN DESCRIPTION - PAIN TYPE
TYPE: SURGICAL PAIN

## 2022-03-15 ASSESSMENT — PULMONARY FUNCTION TESTS
PIF_VALUE: 19
PIF_VALUE: 19
PIF_VALUE: 13
PIF_VALUE: 19
PIF_VALUE: 18
PIF_VALUE: 19
PIF_VALUE: 21
PIF_VALUE: 19
PIF_VALUE: 9
PIF_VALUE: 19
PIF_VALUE: 20
PIF_VALUE: 9
PIF_VALUE: 18
PIF_VALUE: 19
PIF_VALUE: 9
PIF_VALUE: 19
PIF_VALUE: 20
PIF_VALUE: 19
PIF_VALUE: 9
PIF_VALUE: 19
PIF_VALUE: 20
PIF_VALUE: 19
PIF_VALUE: 9
PIF_VALUE: 24
PIF_VALUE: 34
PIF_VALUE: 19
PIF_VALUE: 19
PIF_VALUE: 21
PIF_VALUE: 22
PIF_VALUE: 19
PIF_VALUE: 31
PIF_VALUE: 19
PIF_VALUE: 20
PIF_VALUE: 19
PIF_VALUE: 3
PIF_VALUE: 21
PIF_VALUE: 19
PIF_VALUE: 17
PIF_VALUE: 20
PIF_VALUE: 19
PIF_VALUE: 20
PIF_VALUE: 22
PIF_VALUE: 19
PIF_VALUE: 0
PIF_VALUE: 19
PIF_VALUE: 2
PIF_VALUE: 19
PIF_VALUE: 17
PIF_VALUE: 19
PIF_VALUE: 18
PIF_VALUE: 8
PIF_VALUE: 21
PIF_VALUE: 18
PIF_VALUE: 21
PIF_VALUE: 10
PIF_VALUE: 28
PIF_VALUE: 19
PIF_VALUE: 8
PIF_VALUE: 19
PIF_VALUE: 19
PIF_VALUE: 17
PIF_VALUE: 19
PIF_VALUE: 19
PIF_VALUE: 18
PIF_VALUE: 19
PIF_VALUE: 19
PIF_VALUE: 18
PIF_VALUE: 21
PIF_VALUE: 19
PIF_VALUE: 21
PIF_VALUE: 19
PIF_VALUE: 1
PIF_VALUE: 17
PIF_VALUE: 19
PIF_VALUE: 19
PIF_VALUE: 20
PIF_VALUE: 19
PIF_VALUE: 12
PIF_VALUE: 19
PIF_VALUE: 12
PIF_VALUE: 19
PIF_VALUE: 21
PIF_VALUE: 19
PIF_VALUE: 1
PIF_VALUE: 20
PIF_VALUE: 2
PIF_VALUE: 19
PIF_VALUE: 19
PIF_VALUE: 9
PIF_VALUE: 19
PIF_VALUE: 20
PIF_VALUE: 19
PIF_VALUE: 23
PIF_VALUE: 20
PIF_VALUE: 21
PIF_VALUE: 19
PIF_VALUE: 19
PIF_VALUE: 18
PIF_VALUE: 24
PIF_VALUE: 19
PIF_VALUE: 19
PIF_VALUE: 18
PIF_VALUE: 19
PIF_VALUE: 18
PIF_VALUE: 22
PIF_VALUE: 20
PIF_VALUE: 19
PIF_VALUE: 18
PIF_VALUE: 18
PIF_VALUE: 19
PIF_VALUE: 19
PIF_VALUE: 18
PIF_VALUE: 19
PIF_VALUE: 19
PIF_VALUE: 18
PIF_VALUE: 19
PIF_VALUE: 20
PIF_VALUE: 20
PIF_VALUE: 18
PIF_VALUE: 21
PIF_VALUE: 19
PIF_VALUE: 18
PIF_VALUE: 19
PIF_VALUE: 26
PIF_VALUE: 7
PIF_VALUE: 18
PIF_VALUE: 19
PIF_VALUE: 17
PIF_VALUE: 19
PIF_VALUE: 21
PIF_VALUE: 19
PIF_VALUE: 9
PIF_VALUE: 5
PIF_VALUE: 19
PIF_VALUE: 18
PIF_VALUE: 18
PIF_VALUE: 19
PIF_VALUE: 19
PIF_VALUE: 9
PIF_VALUE: 1
PIF_VALUE: 20
PIF_VALUE: 18
PIF_VALUE: 9
PIF_VALUE: 9
PIF_VALUE: 19
PIF_VALUE: 1
PIF_VALUE: 19
PIF_VALUE: 22
PIF_VALUE: 19
PIF_VALUE: 19
PIF_VALUE: 22
PIF_VALUE: 19
PIF_VALUE: 19
PIF_VALUE: 20
PIF_VALUE: 19
PIF_VALUE: 18
PIF_VALUE: 19
PIF_VALUE: 14
PIF_VALUE: 19
PIF_VALUE: 20
PIF_VALUE: 19
PIF_VALUE: 1
PIF_VALUE: 19
PIF_VALUE: 9
PIF_VALUE: 21
PIF_VALUE: 19
PIF_VALUE: 19
PIF_VALUE: 1
PIF_VALUE: 19
PIF_VALUE: 19
PIF_VALUE: 17
PIF_VALUE: 19
PIF_VALUE: 19
PIF_VALUE: 21
PIF_VALUE: 19
PIF_VALUE: 4
PIF_VALUE: 21
PIF_VALUE: 19
PIF_VALUE: 18
PIF_VALUE: 9
PIF_VALUE: 19
PIF_VALUE: 19
PIF_VALUE: 18
PIF_VALUE: 19
PIF_VALUE: 17
PIF_VALUE: 19
PIF_VALUE: 19
PIF_VALUE: 9
PIF_VALUE: 22
PIF_VALUE: 19

## 2022-03-15 ASSESSMENT — PAIN SCALES - GENERAL
PAINLEVEL_OUTOF10: 8
PAINLEVEL_OUTOF10: 7
PAINLEVEL_OUTOF10: 3
PAINLEVEL_OUTOF10: 0
PAINLEVEL_OUTOF10: 4
PAINLEVEL_OUTOF10: 8
PAINLEVEL_OUTOF10: 4
PAINLEVEL_OUTOF10: 0
PAINLEVEL_OUTOF10: 3
PAINLEVEL_OUTOF10: 4

## 2022-03-15 ASSESSMENT — PAIN DESCRIPTION - LOCATION
LOCATION: CHEST

## 2022-03-15 NOTE — ANESTHESIA PRE PROCEDURE
Department of Anesthesiology  Preprocedure Note       Name:  Cari Montana   Age:  60 y.o.  :  1960                                          MRN:  [id number]         Date:  3/15/2022      Surgeon: Latrice Rao):  MD Jose Martin Ruiz MD    Procedure: Procedure(s):  BILATERAL TOTAL MASTECTOMY  BILATERAL BREAST TISSUE EXPANDER PLACEMENT    Medications prior to admission:   Prior to Admission medications    Medication Sig Start Date End Date Taking? Authorizing Provider   gabapentin (NEURONTIN) 300 MG capsule 600 mg at bedtime.  2/15/22  Yes Davie Caicedo MD   levothyroxine (SYNTHROID) 75 MCG tablet TAKE ONE TABLET BY MOUTH DAILY ON AN EMPTY STOMACH TAKE WITH WATER AND WAIT 30 MINUTES BEFORE EATING OR TAKING OTHER MEDS, 21  Yes Karson Edge MD   amLODIPine (NORVASC) 10 MG tablet Take 1 tablet by mouth daily 21  Yes Karson Edge MD   meloxicam (MOBIC) 15 MG tablet 15 mg daily 2/15/22   Davie Caicedo MD       Current medications:    Current Facility-Administered Medications   Medication Dose Route Frequency Provider Last Rate Last Admin    lactated ringers infusion   IntraVENous Continuous Jose Martin Webb MD        acetaminophen (TYLENOL) tablet 650 mg  650 mg Oral Once Mark Hines MD        aprepitant (EMEND) capsule 40 mg  40 mg Oral Once Mark Hines MD        HYDROmorphone (DILAUDID) injection 0.25 mg  0.25 mg IntraVENous Q5 Min PRN Mark Hines MD        HYDROmorphone (DILAUDID) injection 0.5 mg  0.5 mg IntraVENous Q5 Min PRN Mark Hines MD        oxyCODONE (ROXICODONE) immediate release tablet 5 mg  5 mg Oral Once PRN Mark Hines MD        ondansetron [facility]) injection 4 mg  4 mg IntraVENous Once PRN Mark Hines MD        labetalol (NORMODYNE;TRANDATE) injection 5 mg  5 mg IntraVENous Q15 Min PRN Mark Hines MD        lidocaine PF 1 % injection 1 mL  1 mL IntraDERmal Once PRN Mark Hines MD        0.9 % sodium chloride infusion   IntraVENous Continuous Mario Valenzuela MD        0.9 % sodium chloride infusion   IntraVENous Continuous Mario Valenzuela MD           Allergies: Allergies   Allergen Reactions    Latex      Added based on information entered during case entry, please review and add reactions, type, and severity as needed       Problem List:    Patient Active Problem List   Diagnosis Code    Fatigue R53.83    Migraines G43.909    Arthritis M19.90    Acquired hypothyroidism E03.9    Vitamin D deficiency E55.9    Abnormal finding on EKG R94.31    History of DVT (deep vein thrombosis) Z86.718    Obesity (BMI 30.0-34. 9) E66.9    Essential hypertension I10    S/P total knee replacement, left Z96.652    Insomnia G47.00    Obstructive sleep apnea syndrome G47.33    Depression F32. A    Hypercholesterolemia E78.00    Postoperative pain G89.18    Ductal carcinoma in situ (DCIS) of left breast D05.12       Past Medical History:        Diagnosis Date    Abnormal finding on EKG 3/21/2013    Normal stress myoview 3/21/13     Arthritis     widespread, former ballerina    Degenerative disc disease, cervical     cervical  disc fusion C4-C7; Dr. Molly Lynch    History of depression     History of DVT (deep vein thrombosis) 1991    s/p arthroscopy, left    Migraines     Parotid mass 12/4/2017    S/p excision 2/2018. Pleomorphic adenoma. Dr. Garry Patterson Sleep apnea     no cpap.  [facility]    Thyroid disease        Past Surgical History:        Procedure Laterality Date    BREAST BIOPSY Left 1/12/2022    LEFT LOCALIZED TWO - SITE EXCISIONAL BREAST BIOPSY performed by Joselito Haji MD at [place] [address] Left 1/19/2022    LEFT BREAST MARGIN RE-EXCISION (03292-93) performed by [facility] at [facility]  2005    C4-7, decompressive anterior discectomies and foraminotomiesC4-7;Bohinski    HIP ARTHROPLASTY Right [id number]    RIGHT TOTAL HIP ARTHROPLASTY ANTERIOR APPROACH    HIP ARTHROPLASTY Left 2017    JOINT REPLACEMENT Left 2017    Dr. Rodriguez Corporal ARTHROSCOPY      bilateral, multiple    GIANNA BREAST LOC ADDITIONAL LESION LEFT Left 2022    GIANNA BREAST LOC ADDITIONAL LESION LEFT 2022 [facility] [address] [address]    GIANNA STEROTACTIC LOC BREAST BIOPSY LEFT Left 10/25/2021    GIANNA STEROTACTIC LOC BREAST BIOPSY LEFT 10/25/2021 [facility] [address]    NASAL SEPTUM SURGERY      PLANTAR FASCIA SURGERY  2006    left; Dr. Juan Daivd Iqbalhal      left superficial parotidectomy, pleomorphic adenoma    TOTAL KNEE ARTHROPLASTY  2009    left; Dr. Ellis Oneill Right 12    Dr. Alejandra Lew       Social History:    Social History     Tobacco Use    Smoking status: Former Smoker     Packs/day: 0.50     Years: 3.00     Pack years: 1.50     Quit date: 1991     Years since quittin.3    Smokeless tobacco: Never Used   Substance Use Topics    Alcohol use: Yes     Alcohol/week: 0.8 - 1.7 standard drinks     Types: 1 - 2 Standard drinks or equivalent per week     Comment: 6/week                                Counseling given: Not Answered      Vital Signs (Current):   Vitals:    03/15/22 0700   BP: (!) 173/95   Pulse: 72   Resp: 18   Temp: 97.9 °F (36.6 °C)   TempSrc: Temporal   SpO2: 96%   Weight: 222 lb (100.7 kg)   Height: 5' 6\" (1.676 m)                                              BP Readings from Last 3 Encounters:   03/15/22 (!) 173/95   22 134/72   22 (!) 139/93       NPO Status:                                                                                 BMI:   Wt Readings from Last 3 Encounters:   03/15/22 222 lb (100.7 kg)   22 222 lb (100.7 kg)   22 209 lb (94.8 kg)     Body mass index is 35.83 kg/m².     CBC:   Lab Results   Component Value Date    WBC 9.6 2019    RBC 4.54 2019    HGB 13.2 2019    HCT 39.3 2019    MCV 86.6 2019    RDW 13.4 08/20/2019     08/20/2019       CMP:   Lab Results   Component Value Date     01/05/2022    K 3.7 01/05/2022     01/05/2022    CO2 23 01/05/2022    BUN 15 01/05/2022    CREATININE 0.7 01/05/2022    GFRAA >60 01/05/2022    GFRAA 104 03/15/2013    AGRATIO 2.0 08/20/2019    LABGLOM >60 01/05/2022    GLUCOSE 97 01/05/2022    PROT 6.8 08/20/2019    PROT 7.5 11/08/2011    CALCIUM 9.3 01/05/2022    BILITOT 0.7 08/20/2019    ALKPHOS 55 08/20/2019    AST 21 08/20/2019    ALT 26 08/20/2019       POC Tests: No results for input(s): POCGLU, POCNA, POCK, POCCL, POCBUN, POCHEMO, POCHCT in the last 72 hours. Coags:   Lab Results   Component Value Date    PROTIME 10.8 06/13/2017    INR 0.96 06/13/2017    APTT 25.9 03/15/2013       HCG (If Applicable): No results found for: PREGTESTUR, PREGSERUM, HCG, HCGQUANT     ABGs: No results found for: PHART, PO2ART, SLE9MDD, FTS7CRQ, BEART, W1OAGNHM     Type & Screen (If Applicable):  No results found for: LABABO, LABRH    Drug/Infectious Status (If Applicable):  No results found for: HIV, HEPCAB    COVID-19 Screening (If Applicable):   Lab Results   Component Value Date    COVID19 Not Detected 01/19/2022    COVID19 Not Detected 01/06/2022           Anesthesia Evaluation  Patient summary reviewed and Nursing notes reviewed   history of anesthetic complications: PONV. Airway: Mallampati: II  TM distance: >3 FB   Neck ROM: full  Mouth opening: > = 3 FB Dental: normal exam     Comment: Chipped bottom right molar    Pulmonary: breath sounds clear to auscultation  (+) sleep apnea:                             Cardiovascular:    (+) hypertension:,     (-) CABG/stent, dysrhythmias and  angina      Rhythm: regular  Rate: normal                    Neuro/Psych:   (+) headaches: migraine headaches,              ROS comment: Cervical spine fusion GI/Hepatic/Renal:            ROS comment: Occ gerd sx. None today or last night.    Endo/Other:    (+) hypothyroidism: arthritis:., malignancy/cancer (DCIS). Abdominal:   (+) obese,           Vascular:   + DVT, . Other Findings:             Anesthesia Plan      general     ASA 3       Induction: intravenous. MIPS: Postoperative opioids intended and Prophylactic antiemetics administered. Anesthetic plan and risks discussed with patient. Use of blood products discussed with patient whom consented to blood products.                    Denis Blanc MD   3/15/2022

## 2022-03-15 NOTE — PROGRESS NOTES
Discharge instructions review with patient and  Nahum Hfof. All home medications have been reviewed, pt v/u. Discharge instructions signed. Pt discharged via wheelchair. Pt discharged with all belongings and supplies to empty ARIANE and instructions and emesis bags.  Nahum Hoff taking stable pt home.

## 2022-03-15 NOTE — TELEPHONE ENCOUNTER
Left a message for pt to return my call. Dr. Elisa Knox needs a VV appointment tomorrow to evaluate her drains. I put her on a 1000. But Dr. Elisa Knox will send a link when she has time between O.R. Patients.          Thanks, Chan Amezquita

## 2022-03-15 NOTE — PROGRESS NOTES
Arrived in PACU following surgical procedure (total mastectomy left sentinel node bx and breast tissue expander per Dr Capellan/ Dr Carmencita Gautam)  Report received from Nolan Freeman and anesthesiologist.  Pt asleep with oral airway in place. Does not awaken to verbal or tactile stimulation at this time. Dressings C,D &I to bilateral breast. Bilateral ARIANE drains in place with bulbs compressed. Both draining bloody fluid. VSS.  Will continue to monitor

## 2022-03-15 NOTE — OP NOTE
Operative Note    Postoperative Note    Janette Ying  YOB: 1960  [id number]    Pre-operative Diagnosis: T is Nx left breast cancer    Post-operative Diagnosis: Same    Procedure: Injection of isosulfuran blue dye for lymphatic mapping, injection of radiopharmaceutical for lymphatic mapping, lymphatic mapping with neoprobe unit, ,left deep axillary selective lymph node dissection,left skin sparing mastectomy, right prophylactic mastectomy    Anesthesia: General    Surgeons/Assistants: Mary Mar    Estimated Blood Loss: 50    Drains: see plastic surgery notes    Complications: None apparent at conclusion of procedure    Specimens: bilateral breast tissue, sentinel nodes (see below for details)    Findings: sentinel nodes, see below    Post-Op Condition: Stable    Disposition: to recovery room  Description of Procedure:   Ms. Maximiliano Ignacio is a 60 y.o. woman with a clinical Tis Nx left breast cancer. After initial resection she was identified to have DCIS approach her medial margin. She underwent resection with further disease identified and once again extend to the new medial margin. She has elected to proceed with bilateral skin sparing mastectomy and selective lymph node dissection. She will also be undergoing immediate chest wall reconstruction with tissue expanders. The indications for the planned procedure, along with the potential benefits and risks which include but are not limited to the risk of anesthesia, bleeding, infection, possible failed operation, possible need for additional surgery pending final pathologic assessment, lymphedema, sensation changes, and unappealing cosmetics were reviewed. All questions were answered and she agrees to proceed. Ms. Maximiliano Ignacio was met by me in the preoperative area. The surgical site was identified. The patient's surgical site was marked. Consent was obtained. The appropriate breast imaging was reviewed.      She was brought to the operating room and placed supine with her arms extended on boards. She was appropriately positioned and padded. Compression stockings were placed. Appropriate antibiotics were administered within 60 minutes of the incision. Breast imaging was available in the room. After induction of general anesthesia, the appropriate World Health Organization timeout procedure was performed. At initiation of the case, Tc ws not yet available. We proceeded with the right breast mastectomy. Once available, injections were performed. The bilateral breast and axillary region, upper arm, and chest wall were prepped and draped in the normal sterile fashion. The skin and soft tissues over the proposed mastectomy incisions was infiltrated with local. An incision was made in an elliptical fashion to include the nipple areolar complex and the central breast skin of the right breast. Dissection was carried through the subcutaneous tissues and hemostasis obtained. Flaps were then raised superiorly towards the level of the clavicle, medially to the sternum, laterally to the latissimus dorsi muscle, and inferiorly to the inframammary crease. Perforating vessels were clipped or preserved as they were identified. After flaps are elevated and dissection carried to the chest wall, the breast and fascia are removed from the pectoralis muscle from superior/medial to inferior/lateral.  The specimen is oriented with a short stitch superiorly and a long stitch laterally. Skin marks skin. Cautery, surgical clips, and hemostatic agents were utilized as necessary. The wound was irrigated. Exparel was utilized. Once hemostasis was achieved the chest wall was covered in a sterile fashion and sealed with an ioban. We then performed our injections. At 0402 0.5 millicuries of Lymphoseek technetium-99 sulfur colloid was injected intradermally in a periareolar location at 12:00, 3:00, 6:00, and 9:00.   A neoprobe was then used to identify a hot spot. The location was marked. This initial count was 159. After this 5 mL of  isosulfuran blue dye  was injected intraparenchymally at 1017. A five minute massage was performed. The bilateral breast and axillary region, upper arm, and chest wall were prepped and draped in the normal sterile fashion once again. At this point the right sided procedure was taken over by Dr. Davonte Pickard of plastic surgery to proceed with stage I reconstruction. The contralateral left breast was then removed in a similar fashion as the right. Once freed, the lateral portion of the specimen is survielled with the neoprobe for sentinel lymph nodes. None were identified. The specimen is oriented with a short stitch superiorly and a long stitch laterally. Skin marks skin. Upon entering the axilla, the neoprobe was used to direct the identification of sentinel lymph nodes. [place] node 1 was noted to be at level 1 and excised from the surrounding subcutaneous tissues using blunt dissection and electrocautery. Small lymphatics and vascular structures were identified and controlled with electrocautery and surgical clips. Ex-vivo counts of sentinel node 1 were 2009, it was blue, and was palpable. There were 2 additional sentinel nodes identified and removed in a similar fashion. [place] node 2 was located at level 1, ex-vivo counts were 449, it was blue, and was palpable. [place] node 3 was located at level 1, ex-vivo counts were 411, it was blue, and was palpable. All nodes were placed into containers, labeled, and submitted to pathology. [place] counts following removal of the final sentinel node were 1. The axilla was then assessed for other blue channels/nodes and palpably abnormal lymph nodes. Blue nodes, non colored nodes extending from dye filled channels, radioactive and palpably suspicious nodes were removed. The axilla was then assessed for hemostasis.  Cautery, surgical clips, and hemostatic agents were utilized as necessary. The wound was irrigated. Exparel was utilized. The clavipectoral fascia was closed. She remained under anesthesia for the completion of the reconstructive portion of the procedure. Her family was made aware of intraoperative findings. All sentinel nodes and breast specimens were sent to pathology for review. Instrument, sponge, and needle counts were correct.     Electronically signed by Ryan Suárez MD on 3/15/22 at 8:17 AM EDT

## 2022-03-15 NOTE — BRIEF OP NOTE
Brief Postoperative Note      Patient: Dani Ramírez  YOB: 1960  MRN: [id number]    Date of Procedure: 3/15/2022    Pre-Op Diagnosis: D05.12 DUCTAL CARCINOMA IN SITU OF LEFT BREAST  C50.912/Z85.3 MALIGNANT NEOPLASM OF UNSPECIFIED SITE LEFT FEMALE BREAST    Post-Op Diagnosis: Same       Procedure(s):  BILATERAL TOTAL MASTECTOMY; LEFT SENTINEL LYMPH NODE BIOPSY; TC-99, BLUE DYE  BILATERAL BREAST TISSUE EXPANDER PLACEMENT    Surgeon(s):  MD Armando Woodall MD    Assistant:  Surgical Assistant: Sheyla Cameron    Anesthesia: General    Estimated Blood Loss (mL): less than 50     Complications: None    Specimens:   ID Type Source Tests Collected by Time Destination   A : A- RIGHT BREAST TISSUE- LONG LATERAL; SHORT SUPERIOR Tissue Tissue SURGICAL PATHOLOGY Armando Hawkins MD 3/15/2022 1011    B : B. LEFT BREAST TISSUE - LONG STITCH LATERAL, SHORT STITCH SUPERIOR Tissue Tissue SURGICAL PATHOLOGY Armando Hawkins MD 3/15/2022 1138    C : LEFT SENTINEL LYMPH NODE #1 Tissue Lymph Node SURGICAL PATHOLOGY Armando Hawkins MD 3/15/2022 1148    D : LEFT SENTINEL LYMPH NODE #2 Tissue Lymph Node SURGICAL PATHOLOGY Armando Hawkins MD 3/15/2022 1150    E : LEFT SENTINEL LYMPH NODE #3 Tissue Lymph Node SURGICAL PATHOLOGY Armando Hawkins MD 3/15/2022 1152        Implants:  Implant Name Type Inv. Item Serial No.  Lot No. LRB No. Used Action   MENTOR ARTOURA  ULTRA HIGH PROFILE , SMOOTH     [id number] Right 1 Implanted   MENTOR ARTOURA ULTRA HIGH PROFILE , SMOOTH     [id number] Left 1 Implanted         Drains:   Closed/Suction Drain Inferior;Right Breast Bulb 15 [place] (Active)       Closed/Suction Drain Inferior; Left Breast Bulb 15 [place] (Active)       Findings: see dictation    Electronically signed by Marlin Greer MD on 3/15/2022 at 12:33 PM

## 2022-03-15 NOTE — PROGRESS NOTES
Beginning to awaken. Oral airway removed easily. Pt denies nausea. Only C/O minimal discomfort 3/10. \"Just wants to sleep a bit\".

## 2022-03-15 NOTE — ANESTHESIA POSTPROCEDURE EVALUATION
Department of Anesthesiology  Postprocedure Note    Patient: John Wilder  MRN: [id number]  YOB: 1960  Date of evaluation: 3/15/2022  Time:  2:05 PM     Procedure Summary     Date: 03/15/22 Room / Location: [address]    Anesthesia Start: 6627 Anesthesia Stop: 4888    Procedures:       BILATERAL TOTAL MASTECTOMY; LEFT SENTINEL LYMPH NODE BIOPSY; TC-99, BLUE DYE (Bilateral Breast)      BILATERAL BREAST TISSUE EXPANDER PLACEMENT (Bilateral Breast) Diagnosis:       (D05.12 DUCTAL CARCINOMA IN SITU OF LEFT BREAST)      (C50.912/Z85.3 MALIGNANT NEOPLASM OF UNSPECIFIED SITE LEFT FEMALE BREAST)    Surgeons: Kendall Herrmann MD; Vikki Hugo MD Responsible Provider: Gilmer Mills MD    Anesthesia Type: general ASA Status: 3          Anesthesia Type: general    Joel Phase I: Joel Score: 3    Joel Phase II:      Last vitals: Reviewed and per EMR flowsheets.        Anesthesia Post Evaluation    Patient location during evaluation: PACU  Patient participation: complete - patient participated  Level of consciousness: awake  Airway patency: patent  Nausea & Vomiting: no vomiting  Complications: no  Cardiovascular status: hemodynamically stable  Respiratory status: acceptable  Hydration status: euvolemic  Multimodal analgesia pain management approach

## 2022-03-15 NOTE — PROGRESS NOTES
Pt awake and alert at this time. Pt on RA, and VSS. Pt ha finch level 3/10 and denies nausea, tolerating PO. Skin warm and dry. Does have surgical incisions bilat breasts with bilat ARIANE drains in place. Pt meets criteria to be discharged from Phase 1.

## 2022-03-16 ENCOUNTER — TELEMEDICINE (OUTPATIENT)
Dept: SURGERY | Age: 62
End: 2022-03-16

## 2022-03-16 DIAGNOSIS — Z09 POSTOP CHECK: Primary | ICD-10-CM

## 2022-03-16 PROCEDURE — 99024 POSTOP FOLLOW-UP VISIT: CPT | Performed by: SURGERY

## 2022-03-16 NOTE — PROGRESS NOTES
Subjective: There are no acute postoperative issues. Pain is moderately controlled. She is having no nausea/vomiting and tolerating liquids. Objective: Wt Readings from Last 3 Encounters:   03/15/22 222 lb (100.7 kg)   03/02/22 222 lb (100.7 kg)   01/19/22 209 lb (94.8 kg)     Temp Readings from Last 3 Encounters:   03/15/22 96.3 °F (35.7 °C)   03/15/22 97 °F (36.1 °C) (Temporal)   01/19/22 97.4 °F (36.3 °C) (Temporal)     BP Readings from Last 3 Encounters:   03/15/22 113/69   03/15/22 136/82   03/02/22 134/72     Pulse Readings from Last 3 Encounters:   03/15/22 81   03/02/22 74   01/19/22 79       [unfilled]  [unfilled]      Exam:  General: no acute distress  Breast: surgical sites are appropriately tender to palpation, incisions are C/D/I, no hematomas present, flaps viable, dressings/binder in place,  ARIANE appearing serosanguineous   Respiratory: respirations are non-labored and there is no audible distress  Cardiovascular: regular rate, extremities appear well perfused  Neurologic: alert, oriented    Assessment/Plan: 60 y.o. woman POD # 1 s/p bilateral total skin sparing mastectomies with sentinel node biopsy for left breast cancer    Continue pain control  Diet as tolerated  Local wound care/surgical binder  Ambulation  Pathology pending  Fu at routine postop      This communication was performed using Doxy. me. This included two way audio and visual components in lieu of an in person encounter.

## 2022-03-16 NOTE — OP NOTE
[facility] [address]                     [address] [facility], [address] [facility]                                OPERATIVE REPORT    PATIENT NAME: Paolo Garcia                   :        1960  MED REC NO:   [id number]                          ROOM:  ACCOUNT NO:   [de-identified]                           ADMIT DATE: 03/15/2022  PROVIDER:     Oscar Peng MD    DATE OF PROCEDURE:  03/15/2022    PREOPERATIVE DIAGNOSIS:  Left breast cancer. POSTOPERATIVE DIAGNOSIS:  Left breast cancer. OPERATION PERFORMED:  Bilateral immediate breast reconstruction with  tissue expander insertion. SURGEON:  Lawrence Ayon. Shaun Peng MD    ANESTHESIA:  General.    COMPLICATIONS:  There were no complications. DRAINS:  There were two drains placed. ESTIMATED BLOOD LOSS:  50 mL. OPERATIVE PROCEDURE:  The patient was addressed after General Surgery  had performed bilateral mastectomies. The wounds were inspected and  found to be clean and dry. The pectoralis major muscles were then  elevated and reflected from lateral to medial creating a subpectoral  pocket. Hemostasis was achieved with Bovie cautery in each pocket. Next, the serratus anterior muscles were reflected from medial to  lateral creating a subserratus pocket and hemostasis was also achieved. The expanders were prepped and brought onto the field. They were  Artoura smooth [place] expanders. They were placed into the patient  bilaterally, inflated with 250 mL of saline. The muscle edges were closed by sewing the serratus edge to the  pectoralis edge creating a complete submuscular pocket with coverage  over each expander. Hemostasis was again checked and achieved in each  wound. A #10 Fredrick-Castro drains were placed and brought out laterally and  secured on each side and then the wound was closed in two layers using  3-0 Vicryl in the subcutaneous layer and a running subdermal 4-0 Prolene  stitch.   The patient tolerated the procedure well. She was cleansed  with Betadine. Steri-Strips were applied to the incisions. She was  then placed into a gauze and compression vest dressing, extubated,  transferred to a cart and taken to postanesthesia recovery unit in  stable condition.         Federico Choudhary MD    D: 03/16/2022 12:00:01       T: 03/16/2022 14:47:33     [id number]  Job#: [id number]     Doc#: [id number]    CC:

## 2022-03-24 PROBLEM — Z86.000 HISTORY OF DUCTAL CARCINOMA IN SITU (DCIS) OF BREAST: Status: ACTIVE | Noted: 2022-03-02

## 2022-03-28 NOTE — PROGRESS NOTES
PCP:  Medical Oncology: [facility]  Radiation:  Other:      pTisN0  STAGE:  0 left breast cancer      Ms. Aby Redmond is a 60y.o.-year-old woman who is now s/p left partial mastectomy followed by margin reexcision and ultimately completion mastectomy with sentinel lymph node biopsy, contralateral prophylactic mastectomy and reconstruction for left breast cancer. She underwent these procedures on 2022, 2022 and 3/15/2022. She tolerated them well. She is doing well postoperatively but is still experiencing some significant muscular pain with her expanders. She has difficulty with upper extremity range of motion. INTERVAL HISTORY:  On 2022 she underwent left breast partial mastectomy. Pathology identified 5 mm intermediate grade ER positive DCIS. Additional cranial margin was taken at that time with focal DCIS identified and greater than 3 mm to the new margin. In the main specimen DCIS extended to the cranial margin but the new cranial margin showed disease with new margin greater than 3 mm. Additionally there was a separate focus of DCIS noted 1 mm to the medial margin. [id number]     On 2022 she returned to the operating room for a left breast reexcision of medial margin. Additional residual DCIS was noted and extended focally to the new medial margin. [id number]     On 3/15/2022 she returned to the operating room for completion left mastectomy and sentinel lymph node biopsy with contralateral prophylactic right mastectomy with stage I reconstruction. Pathology of the left breast showed no residual DCIS. The right breast was negative. There is 0/3 lymph nodes involved with carcinoma.  [id number]    Pathology:    Department of Pathology   FINAL SURGICAL PATHOLOGY REPORT   Patient Name: Elida Harp          Accession No:  [id number]    Age Sex:   1960    62 Y / F       [facility]   Account No:   [de-identified]                  Collected:     03/15/2022   Med Rec No:    [id number]                 Received:      03/15/2022   Attend Phys: Linda PANG             Completed:     2022   Perform Phys: Linda PANG             FINAL DIAGNOSIS:        A. Breast, right, mastectomy:        - Benign breast and fibroadipose tissue, negative for          malignancy/dysplasia.      - Fibrocystic changes including: Dense interlobular fibrosis, usual          ductal hyperplasia, apocrine metaplasia, sclerosing adenosis and          columnar cell change.      B. Breast, left, mastectomy:        - Negative for residuum of previously diagnosed ductal carcinoma in          situ (DCIS).      - Reactive changes consistent with prior operative site.        - Fibrocystic changes including: Dense interlobular fibrosis, usual          ductal hyperplasia and adenosis.      C. Left sentinel axillary lymph node, #1, excision:        - 1 lymph node negative for malignancy (0/1).  See comment.      D. Left sentinel axillary lymph node, #2, excision:        - 1 lymph node negative for malignancy (0/1).      E. Left sentinel axillary lymph node, #3, excision:        - 1 lymph node negative for malignancy (0/1).    COMMENT: Immunohistochemical stains are performed to better characterize   the tissue in specimen C-E.  Pankeratin is interpreted as negative within   the lymph nodes.     BRENDAN/BRENDAN         Department of Pathology   FINAL SURGICAL PATHOLOGY REPORT   Patient Name: Bryan Mccarthy          Accession No:  [id number]    Age Sex:   1960    61 Y / F       [facility]   Account No:   [de-identified]                  Collected:     2022   Med Rec No:    [id number]                 Received:      2022   Attend Phys: Linda PANG             Completed:     2022   Perform Phys: Linda PANG             FINAL DIAGNOSIS:     Left breast, medial margin, excision:      - Residual ductal carcinoma in-situ (DCIS), intermediate nuclear        grade.      - DCIS extends focally to the new inked medial resection margin.      ERIKA/ERIKA       Department of Pathology   FINAL SURGICAL PATHOLOGY REPORT   Patient Name: Mireya Rueda          Accession No:  [id number]    Age Sex:   1960    61 Y / F       Location:      [facility]   Account No:   [de-identified]                  Collected:     2022   Med Rec No:    [id number]                 Received:      2022   Attend Phys: Nikita PANG             Completed:     2022   Perform Phys: Nikita PANG             FINAL DIAGNOSIS:        A. Left breast tissue, excision:        - Ductal carcinoma in situ (DCIS), intermediate nuclear grade- See          Comment/ Case Summary.        - Lobular carcinoma in situ.        B. New cranial margin, left breast, excision:        - Focal ductal carcinoma in situ; >3mm to new margin. COMMENT: Part A shows scattered small foci of DCIS within the fibrous   tissue associated with biopsy seeds. DCIS focally extends to superior/   cranial margin and 2mm to posterior margin. In addition, there is a small   focus of DCIS (2mm), 1mm to medial margin; this area is away from the   fibrous tissue associated with biopsy seeds. Intradepartmental consultation was obtained with consensus. Scattered lobular carcinoma in situ is present in the background tissue   (confirmed by loss of membrane staining by E-cadherin).      CASE SUMMARY: (DCIS OF THE BREAST: Resection)   Standard(s): AJCC-UICC 8     SPECIMEN   Procedure   Excision (less than total mastectomy)     Specimen Laterality   Left     TUMOR   Histologic Type   Ductal carcinoma in situ   Size (Extent) of DCIS   Estimated size (extent) of DCIS is at least in Millimeters (mm): 5mm    Number of Blocks with DCIS: 8    Number of Blocks Examined: 17   Architectural Patterns   Cribriform   Papillary   Nuclear Grade   Grade II (intermediate)   Necrosis Present, focal     MARGINS   Margin Status   All margins negative for DCIS    Distance from DCIS to Closest Margin:    In main specimen part A: DCIS extending to superior/ cranial margin; 2mm    to posterior margin; and 1mm to medial margin. In part B new cranial   re-excision margin: focal DCIS is present and is >3mm to the new margin. REGIONAL LYMPH NODES   Regional Lymph Node Status   Not applicable (no regional lymph nodes submitted or found)     PATHOLOGIC STAGE CLASSIFICATION (pTNM, AJCC 8th Edition)   pT Category   pTis (DCIS): Ductal carcinoma in situ     pN Category   pN not assigned (no nodes submitted or found)     Breast biomarkers (Performed on block A5):      Estrogen receptor: Positive (>95%, moderate/ strong intensity)       Cold ischemia time: Less than 1 hour   Duration of fixation: Greater than 6 hours and less than 72 hours     Performed by immunohistochemistry with manual quantitation. ER clone is   [place] SP1. WI clone is [place] clone 1E2. HER2 clone is [facility]   anti-her-h2ineu (4B5).  Tests are performed on formalin fixed paraffin   embedded tissue using ULTRAVIEW universal DAB detection kit. Positive and   negative control tissue shows appropriate staining. ER and WI scoring includes the percentage of cells staining positive and   average intensity of expression.  Interpretation follows the ASCO/CAP   guidelines, with any staining of >1% considered positive.  ER results   between 1 and 10% are considered are considered low positive. Her2/deonte scoring follows the ASCO/CAP guidelines: 0, 1+, 2+, 3+. Her2/deonte   \"positive\" (3+) requires circumferential membrane staining that is   complete, intense and in >10% of tumor cells. Equivocal (2+) cases are   defined as weak to moderate complete membrane staining in >10% of cells.    Negative cases (0 or 1+, respectively) display no staining or incomplete   membrane staining that is faint/barely perceptible and in >10% of tumor   cells.   Select cases, including all 2+/equivocal for Her2/deonte on routine   IHC staining, will be sent for Her2/deonte analysis by FISH. References:   Pam Brennan et al, Estrogen and Progesterone Receptor Testing in   Breast Cancer, Arch Pathol Lab Med, Vol 144, May, 2020   Cecile Alberto et al, Human Epidermal Growth Factor Receptor 2 Testing in   Breast Cancer, Arch Pathol Lab Med, Vol 142, November, 2018           JINMI/JINMI           Exam:  General: no acute distress  Breast: There is a well healing scar on the  bilateral reconstructed breast.  There is no active drainage or signs of infection. There is no ecchymosis, apparent hematomas or significant seromas present. Drains are discontinued. She has moderate range of motion with her arm. Respiratory: respirations are non-labored and there is no audible distress  Cardiovascular: regular rate, extremities appear well perfused  Neurologic: alert, oriented      Assessment/Plan:  pTisN0  STAGE:  0 left breast cancer  ER positive  S/p PM --> margin re-excision --> completion mastectomy, SLNB, contralateral prophylactic mastectomy and stage 1 reconstruction    Her pathology results were reviewed with her in detail today. She was provided a copy of her pathology report for her records. Systemic treatment was reviewed. She has followed up with Dr. Ryder Perez and no further treatment recommended. We reviewed indications for radiation. Perioperative limitations at this point are mainly driven by her reconstructive surgery. From a surgical oncology standpoint at this time she can resume normal activity and wearing her regular bras. She should be fully healed in another 6 weeks at which time she can begin massage with lotion to soften scar tissue. We will order Valium to stagger with her narcotic for muscle spasming and improved musculoskeletal discomfort. When able to take NSAIDs I would recommend this for pain control.     I encouraged her to continue self breast evaluation. Follow up surveillance was discussed. Our plan at this time is to follow up with surgical breast oncology office in 3 months. All of the patient's questions were answered at this time, but she was encouraged to call the office with any further inquiries.

## 2022-03-31 ENCOUNTER — OFFICE VISIT (OUTPATIENT)
Dept: SURGERY | Age: 62
End: 2022-03-31

## 2022-03-31 VITALS
RESPIRATION RATE: 18 BRPM | SYSTOLIC BLOOD PRESSURE: 146 MMHG | BODY MASS INDEX: 35.68 KG/M2 | WEIGHT: 222 LBS | OXYGEN SATURATION: 98 % | HEART RATE: 80 BPM | HEIGHT: 66 IN | DIASTOLIC BLOOD PRESSURE: 86 MMHG

## 2022-03-31 DIAGNOSIS — C50.912 PRIMARY CANCER OF LEFT FEMALE BREAST (HCC): ICD-10-CM

## 2022-03-31 DIAGNOSIS — G89.18 POST-OP PAIN: ICD-10-CM

## 2022-03-31 DIAGNOSIS — Z09 POSTOP CHECK: Primary | ICD-10-CM

## 2022-03-31 DIAGNOSIS — M62.838 MUSCLE SPASM: ICD-10-CM

## 2022-03-31 PROCEDURE — 99024 POSTOP FOLLOW-UP VISIT: CPT | Performed by: SURGERY

## 2022-03-31 RX ORDER — OXYCODONE HYDROCHLORIDE AND ACETAMINOPHEN 5; 325 MG/1; MG/1
TABLET ORAL
COMMUNITY
Start: 2022-03-07 | End: 2022-06-14

## 2022-03-31 RX ORDER — DIAZEPAM 2 MG/1
2 TABLET ORAL EVERY 6 HOURS PRN
Qty: 30 TABLET | Refills: 0 | Status: SHIPPED | OUTPATIENT
Start: 2022-03-31 | End: 2022-04-10

## 2022-05-31 ENCOUNTER — TELEPHONE (OUTPATIENT)
Dept: INTERNAL MEDICINE CLINIC | Age: 62
End: 2022-05-31

## 2022-06-01 NOTE — TELEPHONE ENCOUNTER
Is Dr. Jannette Paiz able to accommodate seeing patient for a pre-op on patient's day off, which is June 16th? She has an appointment that day as well with her surgeon at 8:30. Please advise and contact patient at phone # provided.
Please let her know I am not in the office that day. Let her know my partner, Dr. Kenna Avilez has said she would be willing to do her preop that day if still openings, or you can use an appointment slot on Monday 6/13.
Business As Usual   Scheduling)?  Yes

## 2022-06-13 ENCOUNTER — OFFICE VISIT (OUTPATIENT)
Dept: INTERNAL MEDICINE CLINIC | Age: 62
End: 2022-06-13
Payer: COMMERCIAL

## 2022-06-13 VITALS
WEIGHT: 219 LBS | DIASTOLIC BLOOD PRESSURE: 88 MMHG | RESPIRATION RATE: 14 BRPM | SYSTOLIC BLOOD PRESSURE: 110 MMHG | BODY MASS INDEX: 35.2 KG/M2 | HEIGHT: 66 IN | HEART RATE: 88 BPM

## 2022-06-13 DIAGNOSIS — Z01.818 PRE-OP EVALUATION: Primary | ICD-10-CM

## 2022-06-13 DIAGNOSIS — Z86.000 HISTORY OF DUCTAL CARCINOMA IN SITU (DCIS) OF BREAST: ICD-10-CM

## 2022-06-13 DIAGNOSIS — Z86.718 HISTORY OF DVT (DEEP VEIN THROMBOSIS): ICD-10-CM

## 2022-06-13 DIAGNOSIS — I10 ESSENTIAL HYPERTENSION: Chronic | ICD-10-CM

## 2022-06-13 PROCEDURE — 99214 OFFICE O/P EST MOD 30 MIN: CPT | Performed by: INTERNAL MEDICINE

## 2022-06-13 PROCEDURE — 93000 ELECTROCARDIOGRAM COMPLETE: CPT | Performed by: INTERNAL MEDICINE

## 2022-06-13 NOTE — PROGRESS NOTES
[address] [facility]  Preoperative Evaluation  Marisa Morgan, DO  Internal Medicine      [facility]  YOB: 1960    Date of Service:  6/13/2022    Chief Complaint   Patient presents with    Other     Pre-op reconstructive breast post-mastectomy       HPI:    This patient presents tothe office today for a preoperative evaluation at the request of surgeon, Dr. Rosalinda Manley , who plans on performing breast reconstruction on June 22 at [facility].  The current problem began 8 months ago, and symptoms have been unchanged with time. Conservative therapy: N/A. Patient with recent lumpectomy 2/2 DCIS followed by bilateral mastectomy 2/2 margins not being clear. Patient now presenting for pre op for reconstructive surgery bilaterally. Patient feeling okay. Pain present associated with spacers that are currently very uncomfortable. Patient denies any chest pain or shortness of breath. She has been tolerating prior surgeries well. Planned anesthesia: General   Known anesthesia problems: None   Bleeding risk: No recent or remote history of abnormal bleeding  Personal or FH of DVT/PE: Yes - family hx of DVT. Patient also has had hx of DVT post surgical. Her personal hx was in December of 1990. was on coumadin at that time. Has only required ppx with ortho surgery. Recent steroid use: no  Exercise tolerance before surgery at least 4 METS (Can walk up a flight of steps or a hill or walk on level ground at 3 to 4 mph): Yes   Patient objection to receiving blood products: No    Patient Active Problem List   Diagnosis    Fatigue    Migraines    Arthritis    Acquired hypothyroidism    Vitamin D deficiency    Abnormal finding on EKG    History of DVT (deep vein thrombosis)    Obesity (BMI 30.0-34. 9)    Essential hypertension    S/P total knee replacement, left    Insomnia    Obstructive sleep apnea syndrome    Depression    Hypercholesterolemia    Postoperative pain    History of ductal carcinoma in situ (DCIS) of breast       Review of Systems  ROS negative except for those noted in the HPI above. Allergies   Allergen Reactions    Latex      Added based on information entered during case entry, please review and add reactions, type, and severity as needed     Outpatient Medications Marked as Taking for the 6/13/22 encounter (Office Visit) with Moshe Araujo, DO   Medication Sig Dispense Refill    meloxicam (MOBIC) 15 MG tablet 15 mg daily      levothyroxine (SYNTHROID) 75 MCG tablet TAKE ONE TABLET BY MOUTH DAILY ON AN EMPTY STOMACH TAKE WITH WATER AND WAIT 30 MINUTES BEFORE EATING OR TAKING OTHER MEDS, 90 tablet 0    amLODIPine (NORVASC) 10 MG tablet Take 1 tablet by mouth daily 90 tablet 0       Past Medical History:   Diagnosis Date    Abnormal finding on EKG 3/21/2013    Normal stress myoview 3/21/13     Arthritis     widespread, former ballerina    Degenerative disc disease, cervical     cervical  disc fusion C4-C7; Dr. Robles Argueta    History of depression     History of DVT (deep vein thrombosis) 1991    s/p arthroscopy, left    Hypertension     Migraines     Parotid mass 12/4/2017    S/p excision 2/2018. Pleomorphic adenoma. Dr. Clayton Beltran Sleep apnea     no cpap.  [facility]    Thyroid disease      Past Surgical History:   Procedure Laterality Date    BREAST BIOPSY Left 1/12/2022    LEFT LOCALIZED TWO - SITE EXCISIONAL BREAST BIOPSY performed by Scott Cowan MD at [address] [place] [address] Bilateral 3/15/2022    BILATERAL BREAST TISSUE EXPANDER PLACEMENT performed by Chelsie Britton MD at [place] [address] Left 1/19/2022    LEFT BREAST MARGIN RE-EXCISION (01583-73) performed by Scott Cowan MD at [facility]  2005    C4-7, decompressive anterior discectomies and foraminotomiesC4-7;Oren    HIP ARTHROPLASTY Right [id number]    RIGHT TOTAL HIP ARTHROPLASTY ANTERIOR APPROACH  HIP ARTHROPLASTY Left 2017    JOINT REPLACEMENT Left 2017    Dr. Sukhwinder Pierre ARTHROSCOPY      bilateral, multiple    GIANNA BREAST LOC ADDITIONAL LESION LEFT Left 2022    GIANNA BREAST LOC ADDITIONAL LESION LEFT 2022 [facility] [address]    GIANNA STEROTACTIC LOC BREAST BIOPSY LEFT Left 10/25/2021    GIANNA STEROTACTIC LOC BREAST BIOPSY LEFT 10/25/2021 [facility] [address]    MASTECTOMY Bilateral 3/15/2022    BILATERAL TOTAL MASTECTOMY; LEFT SENTINEL LYMPH NODE BIOPSY; TC-99, BLUE DYE performed by Jimy Riggs MD at [place] [address]      left; Dr. Trivedi Sick      left superficial parotidectomy, pleomorphic adenoma    TOTAL KNEE ARTHROPLASTY      left; Dr. Efraín Iyer Right 12    Dr. Francisco Packer     Family History   Problem Relation Age of Onset    Heart Attack Mother         68    Heart Failure Mother     Breast Cancer Mother     Hypertension Father     Cancer Father         fibrohistiosarcoma    Deep Vein Thrombosis Father     Atrial Fibrillation Sister     Hypertension Sister     Breast Cancer Sister     BRCA 2 Negative Sister     BRCA 1 Negative Sister     Sleep Apnea Sister     Deep Vein Thrombosis Sister     Breast Cancer Maternal Grandmother      Social History     Socioeconomic History    Marital status:      Spouse name: Not on file    Number of children: 1    Years of education: Not on file    Highest education level: Not on file   Occupational History    Occupation: company  patient assistance program for [employer]   Tobacco Use    Smoking status: Former Smoker     Packs/day: 0.50     Years: 3.00     Pack years: 1.50     Quit date: 1991     Years since quittin.6    Smokeless tobacco: Never Used   Vaping Use    Vaping Use: Not on file   Substance and Sexual Activity    Alcohol use:  Yes     Alcohol/week: 0.8 - 1.7 standard drinks Types: 1 - 2 Standard drinks or equivalent per week     Comment: 6/week    Drug use: No    Sexual activity: Yes     Partners: Male     Birth control/protection: Post-menopausal   Other Topics Concern    Not on file   Social History Narrative    Exercise:        Walks dog 2 miles 5-7/week            Diet:  Diet very poor currently with FT work and school. Social Determinants of Health     Financial Resource Strain: Medium Risk    Difficulty of Paying Living Expenses: Somewhat hard   Food Insecurity: No Food Insecurity    Worried About Running Out of Food in the Last Year: Never true    Nicanor of Food in the Last Year: Never true   Transportation Needs:     Lack of Transportation (Medical): Not on file    Lack of Transportation (Non-Medical):  Not on file   Physical Activity:     Days of Exercise per Week: Not on file    Minutes of Exercise per Session: Not on file   Stress:     Feeling of Stress : Not on file   Social Connections:     Frequency of Communication with Friends and Family: Not on file    Frequency of Social Gatherings with Friends and Family: Not on file    Attends Bahai Services: Not on file    Active Member of [address] [place] [address] or Organizations: Not on file    Attends Club or Organization Meetings: Not on file    Marital Status: Not on file   Intimate Partner Violence:     Fear of Current or Ex-Partner: Not on file    Emotionally Abused: Not on file    Physically Abused: Not on file    Sexually Abused: Not on file   Housing Stability:     Unable to Pay for Housing in the Last Year: Not on file    Number of [place] in the Last Year: Not on file    Unstable Housing in the Last Year: Not on file       Objective:    Vitals:    06/13/22 1327   BP: 110/88   Pulse: 88   Resp: 14   Weight: 219 lb (99.3 kg)   Height: 5' 6\" (1.676 m)       Wt Readings from Last 2 Encounters:   06/13/22 219 lb (99.3 kg)   03/31/22 222 lb (100.7 kg)     BP Readings from Last 3 Encounters: 06/13/22 110/88   03/31/22 (!) 146/86   03/15/22 113/69      Physical Exam  Constitutional:       General: She is not in acute distress. Appearance: Normal appearance. She is not ill-appearing. HENT:      Head: Normocephalic and atraumatic. Right Ear: External ear normal.      Left Ear: External ear normal.      Nose: No congestion or rhinorrhea. Mouth/Throat:      Mouth: Mucous membranes are moist.      Pharynx: No oropharyngeal exudate or posterior oropharyngeal erythema. Eyes:      General: No scleral icterus. Extraocular Movements: Extraocular movements intact. Conjunctiva/sclera: Conjunctivae normal.   Cardiovascular:      Rate and Rhythm: Normal rate and regular rhythm. Pulses: Normal pulses. Heart sounds: No murmur heard. No friction rub. No gallop. Pulmonary:      Effort: Pulmonary effort is normal. No respiratory distress. Breath sounds: Normal breath sounds. No wheezing, rhonchi or rales. Abdominal:      General: Bowel sounds are normal. There is no distension. Palpations: Abdomen is soft. There is no mass. Tenderness: There is no abdominal tenderness. There is no guarding or rebound. Musculoskeletal:      Cervical back: No muscular tenderness. Right lower leg: No edema. Left lower leg: No edema. Lymphadenopathy:      Cervical: No cervical adenopathy. Skin:     General: Skin is warm. Findings: No erythema or rash. Neurological:      General: No focal deficit present. Mental Status: She is alert and oriented to person, place, and time.    Psychiatric:         Mood and Affect: Mood normal.         Behavior: Behavior normal.          Lab Results   Component Value Date    CHOLFAST 225 (H) 02/20/2021    TRIGLYCFAST 166 (H) 02/20/2021    HDL 48 01/05/2022    LDLCALC 136 (H) 01/05/2022     Lab Results   Component Value Date    GLUF 112 (H) 02/20/2021    LABA1C 5.7 02/20/2021     Lab Results   Component Value Date     01/05/2022    K 3.7 01/05/2022    BUN 15 01/05/2022    CREATININE 0.7 01/05/2022    LABGLOM >60 01/05/2022    GFRAA >60 01/05/2022    CALCIUM 9.3 01/05/2022    VITD25 25 (L) 04/22/2013     Lab Results   Component Value Date    ALT 26 08/20/2019    AST 21 08/20/2019     Lab Results   Component Value Date    HGB 13.2 08/20/2019     Lab Results   Component Value Date    TSHREFLEX 3.28 01/05/2022    TSH 1.33 11/08/2016             Assessment/Plan:     1. Pre-op evaluation  Patient is low risk for low risk surgery. Okay to proceed with surgery. Patient has been educated by a surgeon to hold Mobic at least 1 week prior to surgery. Okay to take Synthroid the morning of surgery. Can hold other medication morning of surgery. Of note patient has history of prior DVT. Patient has not required any DVT prophylaxis for her breast surgeries over the recent months. She does do prophylaxis with orthopedic surgeries. Of note patient is at higher risk for DVT given her history  - EKG 12 Lead    2. Essential hypertension  Well-controlled on current medications okay to continue current medications    3. History of DVT (deep vein thrombosis)  see #1 above    4. History of ductal carcinoma in situ (DCIS) of breast  see #1 above         Emergent procedure No  Active Cardiac Condition (decompensated HF, Arrhythmia, MI <3 weeks, severe valve disease):  No   Risk Level of Procedure Low Risk (endoscopy, superficial skin, breast, ambulatory, or cataract, etc.)  Revised Cardiac Risk Index Risk factors: None    Patient is low risk for major cardiac complications during a low risk procedure and may continue as planned.     Preoperative workup as follows: ECG  Change in medication regimen before surgery: Take synthroid on morning of surgery with sip of water, and hold all other medications until after surgery  Prophylaxis for cardiac events with perioperative beta-blockers:Not indicated    Deep vein thrombosis prophylaxis: regimen to be

## 2022-06-21 NOTE — PROGRESS NOTES
Patient not reached. Preop instructions left on voice mail. Hwatsx_[phone]______________    -Date_06/22/22______time__0755_____arrival___0625_________  -Nothing to eat or drink after midnight  -Responsible adult 25 or older to stay on site while you are here and drive you home and stay with you after  -Follow any instructions your doctors office has given you  -Bring a complete list of all your medications and supplements  -If you normally take the following medications in the morning please do so with a small    sip of water-heart,blood pressure,seizure,breathing or thyroid-avoid water pilll Do not take blood pressure medications ending in \"dieter\" or \"pril\" the AM of surgery or the kenneth prior (synthroid and norvasc)  -You may use your inhalers  -Take half of your normal dose of any long acting insulins the night before-do not take    any diabetic medications in the morning  -Follow your doctors instructions regarding blood thinners  -Any questions call your surgeons office            VISITOR POLICY(subject to change)    Current policy is 2 visitors per patient. No children. Masks are required. Visiting hours are 8a-8p. Overnight visitors will be at the discretion of the nurse.

## 2022-06-22 ENCOUNTER — HOSPITAL ENCOUNTER (OUTPATIENT)
Age: 62
Setting detail: OUTPATIENT SURGERY
Discharge: HOME | End: 2022-06-22
Attending: SURGERY | Admitting: SURGERY
Payer: COMMERCIAL

## 2022-06-22 ENCOUNTER — ANESTHESIA (OUTPATIENT)
Dept: OPERATING ROOM | Age: 62
End: 2022-06-22
Payer: COMMERCIAL

## 2022-06-22 ENCOUNTER — ANESTHESIA EVENT (OUTPATIENT)
Dept: OPERATING ROOM | Age: 62
End: 2022-06-22
Payer: COMMERCIAL

## 2022-06-22 VITALS
RESPIRATION RATE: 11 BRPM | TEMPERATURE: 97.6 F | DIASTOLIC BLOOD PRESSURE: 72 MMHG | HEART RATE: 85 BPM | WEIGHT: 216 LBS | BODY MASS INDEX: 34.72 KG/M2 | OXYGEN SATURATION: 98 % | HEIGHT: 66 IN | SYSTOLIC BLOOD PRESSURE: 130 MMHG

## 2022-06-22 PROCEDURE — 6360000002 HC RX W HCPCS: Performed by: ANESTHESIOLOGY

## 2022-06-22 PROCEDURE — 3600000013 HC SURGERY LEVEL 3 ADDTL 15MIN: Performed by: SURGERY

## 2022-06-22 PROCEDURE — 6360000002 HC RX W HCPCS: Performed by: NURSE ANESTHETIST, CERTIFIED REGISTERED

## 2022-06-22 PROCEDURE — 3700000001 HC ADD 15 MINUTES (ANESTHESIA): Performed by: SURGERY

## 2022-06-22 PROCEDURE — 3600000003 HC SURGERY LEVEL 3 BASE: Performed by: SURGERY

## 2022-06-22 PROCEDURE — 6360000002 HC RX W HCPCS: Performed by: SURGERY

## 2022-06-22 PROCEDURE — 6370000000 HC RX 637 (ALT 250 FOR IP): Performed by: ANESTHESIOLOGY

## 2022-06-22 PROCEDURE — 2580000003 HC RX 258: Performed by: SURGERY

## 2022-06-22 PROCEDURE — 2500000003 HC RX 250 WO HCPCS: Performed by: NURSE ANESTHETIST, CERTIFIED REGISTERED

## 2022-06-22 PROCEDURE — 2580000003 HC RX 258: Performed by: ANESTHESIOLOGY

## 2022-06-22 PROCEDURE — 2500000003 HC RX 250 WO HCPCS: Performed by: SURGERY

## 2022-06-22 PROCEDURE — 7100000000 HC PACU RECOVERY - FIRST 15 MIN: Performed by: SURGERY

## 2022-06-22 PROCEDURE — 7100000011 HC PHASE II RECOVERY - ADDTL 15 MIN: Performed by: SURGERY

## 2022-06-22 PROCEDURE — 2709999900 HC NON-CHARGEABLE SUPPLY: Performed by: SURGERY

## 2022-06-22 PROCEDURE — 7100000010 HC PHASE II RECOVERY - FIRST 15 MIN: Performed by: SURGERY

## 2022-06-22 PROCEDURE — 7100000001 HC PACU RECOVERY - ADDTL 15 MIN: Performed by: SURGERY

## 2022-06-22 PROCEDURE — 3700000000 HC ANESTHESIA ATTENDED CARE: Performed by: SURGERY

## 2022-06-22 PROCEDURE — C1789 PROSTHESIS, BREAST, IMP: HCPCS | Performed by: SURGERY

## 2022-06-22 DEVICE — SMOOTH ROUND MODERATE CLASSIC PROFILE GEL-FILLED BREAST IMPLANT, 405CC  SMOOTH ROUND SILICONE
Type: IMPLANTABLE DEVICE | Site: BREAST | Status: FUNCTIONAL
Brand: MENTOR MEMORYGEL BREAST IMPLANT

## 2022-06-22 RX ORDER — FENTANYL CITRATE 50 UG/ML
50 INJECTION, SOLUTION INTRAMUSCULAR; INTRAVENOUS EVERY 5 MIN PRN
Status: DISCONTINUED | OUTPATIENT
Start: 2022-06-22 | End: 2022-06-22 | Stop reason: HOSPADM

## 2022-06-22 RX ORDER — SCOLOPAMINE TRANSDERMAL SYSTEM 1 MG/1
1 PATCH, EXTENDED RELEASE TRANSDERMAL
Status: DISCONTINUED | OUTPATIENT
Start: 2022-06-22 | End: 2022-06-22 | Stop reason: HOSPADM

## 2022-06-22 RX ORDER — MIDAZOLAM HYDROCHLORIDE 1 MG/ML
INJECTION INTRAMUSCULAR; INTRAVENOUS PRN
Status: DISCONTINUED | OUTPATIENT
Start: 2022-06-22 | End: 2022-06-22 | Stop reason: SDUPTHER

## 2022-06-22 RX ORDER — LABETALOL HYDROCHLORIDE 5 MG/ML
10 INJECTION, SOLUTION INTRAVENOUS
Status: DISCONTINUED | OUTPATIENT
Start: 2022-06-22 | End: 2022-06-22 | Stop reason: HOSPADM

## 2022-06-22 RX ORDER — HYDRALAZINE HYDROCHLORIDE 20 MG/ML
10 INJECTION INTRAMUSCULAR; INTRAVENOUS
Status: DISCONTINUED | OUTPATIENT
Start: 2022-06-22 | End: 2022-06-22 | Stop reason: HOSPADM

## 2022-06-22 RX ORDER — DIPHENHYDRAMINE HYDROCHLORIDE 50 MG/ML
12.5 INJECTION INTRAMUSCULAR; INTRAVENOUS
Status: DISCONTINUED | OUTPATIENT
Start: 2022-06-22 | End: 2022-06-22 | Stop reason: HOSPADM

## 2022-06-22 RX ORDER — FENTANYL CITRATE 50 UG/ML
25 INJECTION, SOLUTION INTRAMUSCULAR; INTRAVENOUS EVERY 5 MIN PRN
Status: DISCONTINUED | OUTPATIENT
Start: 2022-06-22 | End: 2022-06-22 | Stop reason: HOSPADM

## 2022-06-22 RX ORDER — ONDANSETRON 2 MG/ML
4 INJECTION INTRAMUSCULAR; INTRAVENOUS
Status: COMPLETED | OUTPATIENT
Start: 2022-06-22 | End: 2022-06-22

## 2022-06-22 RX ORDER — OXYCODONE HYDROCHLORIDE 5 MG/1
5 TABLET ORAL PRN
Status: DISCONTINUED | OUTPATIENT
Start: 2022-06-22 | End: 2022-06-22 | Stop reason: HOSPADM

## 2022-06-22 RX ORDER — APREPITANT 40 MG/1
40 CAPSULE ORAL ONCE
Status: COMPLETED | OUTPATIENT
Start: 2022-06-22 | End: 2022-06-22

## 2022-06-22 RX ORDER — HYDROMORPHONE HCL 110MG/55ML
PATIENT CONTROLLED ANALGESIA SYRINGE INTRAVENOUS PRN
Status: DISCONTINUED | OUTPATIENT
Start: 2022-06-22 | End: 2022-06-22 | Stop reason: SDUPTHER

## 2022-06-22 RX ORDER — ROCURONIUM BROMIDE 10 MG/ML
INJECTION, SOLUTION INTRAVENOUS PRN
Status: DISCONTINUED | OUTPATIENT
Start: 2022-06-22 | End: 2022-06-22 | Stop reason: SDUPTHER

## 2022-06-22 RX ORDER — MIDAZOLAM HYDROCHLORIDE 2 MG/2ML
2 INJECTION, SOLUTION INTRAMUSCULAR; INTRAVENOUS
Status: DISCONTINUED | OUTPATIENT
Start: 2022-06-22 | End: 2022-06-22 | Stop reason: HOSPADM

## 2022-06-22 RX ORDER — MEPERIDINE HYDROCHLORIDE 25 MG/ML
12.5 INJECTION INTRAMUSCULAR; INTRAVENOUS; SUBCUTANEOUS
Status: DISCONTINUED | OUTPATIENT
Start: 2022-06-22 | End: 2022-06-22 | Stop reason: HOSPADM

## 2022-06-22 RX ORDER — OXYCODONE HYDROCHLORIDE 5 MG/1
10 TABLET ORAL PRN
Status: DISCONTINUED | OUTPATIENT
Start: 2022-06-22 | End: 2022-06-22 | Stop reason: HOSPADM

## 2022-06-22 RX ORDER — MAGNESIUM SULFATE HEPTAHYDRATE 500 MG/ML
INJECTION, SOLUTION INTRAMUSCULAR; INTRAVENOUS PRN
Status: DISCONTINUED | OUTPATIENT
Start: 2022-06-22 | End: 2022-06-22 | Stop reason: SDUPTHER

## 2022-06-22 RX ORDER — ACETAMINOPHEN 325 MG/1
650 TABLET ORAL ONCE
Status: COMPLETED | OUTPATIENT
Start: 2022-06-22 | End: 2022-06-22

## 2022-06-22 RX ORDER — GLYCOPYRROLATE 0.2 MG/ML
INJECTION INTRAMUSCULAR; INTRAVENOUS PRN
Status: DISCONTINUED | OUTPATIENT
Start: 2022-06-22 | End: 2022-06-22 | Stop reason: SDUPTHER

## 2022-06-22 RX ORDER — DEXAMETHASONE SODIUM PHOSPHATE 4 MG/ML
INJECTION, SOLUTION INTRA-ARTICULAR; INTRALESIONAL; INTRAMUSCULAR; INTRAVENOUS; SOFT TISSUE PRN
Status: DISCONTINUED | OUTPATIENT
Start: 2022-06-22 | End: 2022-06-22 | Stop reason: SDUPTHER

## 2022-06-22 RX ORDER — BUPIVACAINE HYDROCHLORIDE AND EPINEPHRINE 2.5; 5 MG/ML; UG/ML
INJECTION, SOLUTION INFILTRATION; PERINEURAL
Status: COMPLETED | OUTPATIENT
Start: 2022-06-22 | End: 2022-06-22

## 2022-06-22 RX ORDER — SODIUM CHLORIDE, SODIUM LACTATE, POTASSIUM CHLORIDE, CALCIUM CHLORIDE 600; 310; 30; 20 MG/100ML; MG/100ML; MG/100ML; MG/100ML
INJECTION, SOLUTION INTRAVENOUS CONTINUOUS
Status: DISCONTINUED | OUTPATIENT
Start: 2022-06-22 | End: 2022-06-22 | Stop reason: HOSPADM

## 2022-06-22 RX ORDER — LIDOCAINE HYDROCHLORIDE 20 MG/ML
INJECTION, SOLUTION EPIDURAL; INFILTRATION; INTRACAUDAL; PERINEURAL PRN
Status: DISCONTINUED | OUTPATIENT
Start: 2022-06-22 | End: 2022-06-22 | Stop reason: SDUPTHER

## 2022-06-22 RX ORDER — PROPOFOL 10 MG/ML
INJECTION, EMULSION INTRAVENOUS PRN
Status: DISCONTINUED | OUTPATIENT
Start: 2022-06-22 | End: 2022-06-22 | Stop reason: SDUPTHER

## 2022-06-22 RX ORDER — SUCCINYLCHOLINE CHLORIDE 20 MG/ML
INJECTION INTRAMUSCULAR; INTRAVENOUS PRN
Status: DISCONTINUED | OUTPATIENT
Start: 2022-06-22 | End: 2022-06-22 | Stop reason: SDUPTHER

## 2022-06-22 RX ORDER — HALOPERIDOL 5 MG/ML
1 INJECTION INTRAMUSCULAR
Status: DISCONTINUED | OUTPATIENT
Start: 2022-06-22 | End: 2022-06-22 | Stop reason: HOSPADM

## 2022-06-22 RX ORDER — ONDANSETRON 2 MG/ML
INJECTION INTRAMUSCULAR; INTRAVENOUS PRN
Status: DISCONTINUED | OUTPATIENT
Start: 2022-06-22 | End: 2022-06-22 | Stop reason: SDUPTHER

## 2022-06-22 RX ADMIN — LIDOCAINE HYDROCHLORIDE 50 MG: 20 INJECTION, SOLUTION EPIDURAL; INFILTRATION; INTRACAUDAL; PERINEURAL at 07:59

## 2022-06-22 RX ADMIN — ACETAMINOPHEN 650 MG: 325 TABLET ORAL at 07:19

## 2022-06-22 RX ADMIN — SUCCINYLCHOLINE CHLORIDE 100 MG: 20 INJECTION, SOLUTION INTRAMUSCULAR; INTRAVENOUS; PARENTERAL at 08:00

## 2022-06-22 RX ADMIN — ONDANSETRON 4 MG: 2 INJECTION INTRAMUSCULAR; INTRAVENOUS at 10:08

## 2022-06-22 RX ADMIN — HYDROMORPHONE HYDROCHLORIDE 0.5 MG: 2 INJECTION, SOLUTION INTRAMUSCULAR; INTRAVENOUS; SUBCUTANEOUS at 08:14

## 2022-06-22 RX ADMIN — SUGAMMADEX 100 MG: 100 INJECTION, SOLUTION INTRAVENOUS at 09:33

## 2022-06-22 RX ADMIN — HYDROMORPHONE HYDROCHLORIDE 0.5 MG: 2 INJECTION, SOLUTION INTRAMUSCULAR; INTRAVENOUS; SUBCUTANEOUS at 09:38

## 2022-06-22 RX ADMIN — SODIUM CHLORIDE, POTASSIUM CHLORIDE, SODIUM LACTATE AND CALCIUM CHLORIDE: 600; 310; 30; 20 INJECTION, SOLUTION INTRAVENOUS at 07:39

## 2022-06-22 RX ADMIN — PROPOFOL 130 MG: 10 INJECTION, EMULSION INTRAVENOUS at 07:59

## 2022-06-22 RX ADMIN — ROCURONIUM BROMIDE 30 MG: 10 INJECTION, SOLUTION INTRAVENOUS at 08:07

## 2022-06-22 RX ADMIN — CEFAZOLIN 2000 MG: 2 INJECTION, POWDER, FOR SOLUTION INTRAMUSCULAR; INTRAVENOUS at 07:53

## 2022-06-22 RX ADMIN — DEXAMETHASONE SODIUM PHOSPHATE 8 MG: 4 INJECTION, SOLUTION INTRAMUSCULAR; INTRAVENOUS at 08:07

## 2022-06-22 RX ADMIN — HYDROMORPHONE HYDROCHLORIDE 0.5 MG: 2 INJECTION, SOLUTION INTRAMUSCULAR; INTRAVENOUS; SUBCUTANEOUS at 07:55

## 2022-06-22 RX ADMIN — APREPITANT 40 MG: 40 CAPSULE ORAL at 07:19

## 2022-06-22 RX ADMIN — MIDAZOLAM 2 MG: 1 INJECTION INTRAMUSCULAR; INTRAVENOUS at 07:55

## 2022-06-22 RX ADMIN — ONDANSETRON 4 MG: 2 INJECTION INTRAMUSCULAR; INTRAVENOUS at 09:00

## 2022-06-22 RX ADMIN — GLYCOPYRROLATE 0.2 MG: 0.2 INJECTION INTRAMUSCULAR; INTRAVENOUS at 07:55

## 2022-06-22 RX ADMIN — ROCURONIUM BROMIDE 10 MG: 10 INJECTION, SOLUTION INTRAVENOUS at 08:25

## 2022-06-22 RX ADMIN — HYDROMORPHONE HYDROCHLORIDE 0.5 MG: 2 INJECTION, SOLUTION INTRAMUSCULAR; INTRAVENOUS; SUBCUTANEOUS at 09:44

## 2022-06-22 RX ADMIN — MAGNESIUM SULFATE HEPTAHYDRATE 1 G: 500 INJECTION, SOLUTION INTRAMUSCULAR; INTRAVENOUS at 08:07

## 2022-06-22 ASSESSMENT — PAIN - FUNCTIONAL ASSESSMENT: PAIN_FUNCTIONAL_ASSESSMENT: 0-10

## 2022-06-22 NOTE — PROGRESS NOTES
Discharge instructions review with patient and  Tiffani Macho. All home medications have been reviewed, pt v/u. Discharge instructions signed. Pt discharged via wheelchair. Pt discharged with surgical bra and all belongings. Rx at home per . [facility] taking stable pt home.

## 2022-06-22 NOTE — ANESTHESIA POSTPROCEDURE EVALUATION
Department of Anesthesiology  Postprocedure Note    Patient: Joao Mann  MRN: [id number]  YOB: 1960  Date of evaluation: 6/22/2022      Procedure Summary     Date: 06/22/22 Room / Location: [address]    Anesthesia Start: 4036 Anesthesia Stop: 9258    Procedure: BILATERAL TISSUE EXPANDER REMOVAL, BILATERAL BREAST IMPLANT PLACEMENT (Bilateral Breast) Diagnosis:       Personal history of breast cancer      (Personal history of breast cancer [Z85.3])    Surgeons: Fox Nolen MD Responsible Provider: Harini Perkins MD    Anesthesia Type: general ASA Status: 3          Anesthesia Type: No value filed.     Joel Phase I: Joel Score: 9    Joel Phase II: Joel Score: 10    Last vitals:   Vitals Value Taken Time   /85 06/22/22 1030   Temp 98.2 °F (36.8 °C) 06/22/22 0955   Pulse 85 06/22/22 1030   Resp 13 06/22/22 1030   SpO2 95 % 06/22/22 1030         Anesthesia Post Evaluation    Patient location during evaluation: PACU  Patient participation: complete - patient participated  Level of consciousness: awake and alert  Airway patency: patent  Nausea & Vomiting: no nausea and no vomiting  Complications: no  Cardiovascular status: blood pressure returned to baseline  Respiratory status: acceptable  Hydration status: euvolemic  Multimodal analgesia pain management approach

## 2022-06-22 NOTE — PROGRESS NOTES
Pt arrived from OR to PACU bay 1. Reported received from [place] staff. Pt OR arousable to voice. Surgical incisions dressings in place to bilateral breasts. Pt on 6L simple mask, NSR, and VSS. Will continue to monitor.

## 2022-06-22 NOTE — OP NOTE
[facility]                     [facility], [facility]                                OPERATIVE REPORT    PATIENT NAME: Radha Larsen                   :        1960  MED REC NO:   [id number]                          ROOM:  ACCOUNT NO:   [de-identified]                           ADMIT DATE: 2022  PROVIDER:     Faith Love. Raisa Be MD    DATE OF PROCEDURE:  2022    PREOPERATIVE DIAGNOSIS:  Breast cancer. POSTOPERATIVE DIAGNOSIS:  Breast cancer. OPERATION PERFORMED:  Bilateral breast tissue expander removals,  bilateral medial, superior, and inferior capsulotomies, replacement with  permanent breast prosthesis. SURGEON:  Faith Love. Raisa Be MD    ANESTHESIA:  General.    DRAINS:  There were no drains. COMPLICATIONS:  There were no complications. ESTIMATED BLOOD LOSS:  Less than 50 mL. OPERATIVE PROCEDURE:  The patient was measured and marked with her awake  and upright for the capsulotomy portions of the procedure as well as  removal of some excess skin laterally on each side. She was then taken  to the operating room, prepped and draped in the usual manner in supine  position. After induction of general anesthesia and the previous  mastectomy scar laterally was re-incised. First, elliptical excisions  of excess skin laterally on each side were accomplished. Bovie cautery  was used to remove a large amount of skin and fat. Hemostasis was  achieved. Next, the capsules of the expanders were entered from the  lateral aspect and intact expanders were encountered. The expanders  were then deflated and removed intact. Under direct vision, medial and inferior capsulotomies were performed  with Bovie cautery. Hemostasis was achieved with Bovie cautery and the  area was infiltrated with 0.25% Marcaine with epinephrine. Implants were prepped and brought onto the field.   They were 405 mL  round, smooth Benoit gel implants, placed in the patient bilaterally for  good size, shape, and symmetry match both in the supine and upright  positioning on the operating room table. Wounds were then closed in three layers using 2-0 Vicryl in the deep  layer, interrupted 4-0 Vicryl in the subcutaneous layer and a running  subdermal 4-0 Prolene stitch. The patient tolerated the procedure well. She was cleansed with Betadine. Steri-Strips were applied to the  incisions. She was then placed into a gauze and mastectomy bra  dressing. She was extubated, transferred to a cart, and taken to  postanesthesia recovery unit in stable condition where she will be  discharged to home upon meeting discharge criteria.         Paula Vaca MD    D: 06/22/2022 10:21:51       T: 06/22/2022 15:14:14     /[email or web address]  Job#: [id number]     Doc#: [id number]    CC:

## 2022-06-22 NOTE — ANESTHESIA PRE PROCEDURE
Department of Anesthesiology  Preprocedure Note       Name:  Isai Griffin   Age:  60 y.o.  :  1960                                          MRN:  [id number]         Date:  2022      Surgeon: Fay Pappas):  Edgar Aquino MD    Procedure: Procedure(s):  BILATERAL TISSUE EXPANDER REMOVAL, BILATERAL BREAST IMPLANT PLACEMENT    Medications prior to admission:   Prior to Admission medications    Medication Sig Start Date End Date Taking? Authorizing Provider   meloxicam (MOBIC) 15 MG tablet 15 mg daily 2/15/22   Pat Ricks MD   levothyroxine (SYNTHROID) 75 MCG tablet TAKE ONE TABLET BY MOUTH DAILY ON AN EMPTY STOMACH TAKE WITH WATER AND WAIT 30 MINUTES BEFORE EATING OR TAKING OTHER MEDS, 21   June Rajan MD   amLODIPine (NORVASC) 10 MG tablet Take 1 tablet by mouth daily 21   June Rajan MD       Current medications:    No current outpatient medications on file. No current facility-administered medications for this visit. Allergies: Allergies   Allergen Reactions    Latex      Added based on information entered during case entry, please review and add reactions, type, and severity as needed       Problem List:    Patient Active Problem List   Diagnosis Code    Fatigue R53.83    Migraines G43.909    Arthritis M19.90    Acquired hypothyroidism E03.9    Vitamin D deficiency E55.9    Abnormal finding on EKG R94.31    History of DVT (deep vein thrombosis) Z86.718    Obesity (BMI 30.0-34. 9) E66.9    Essential hypertension I10    S/P total knee replacement, left Z96.652    Insomnia G47.00    Obstructive sleep apnea syndrome G47.33    Depression F32. A    Hypercholesterolemia E78.00    Postoperative pain G89.18    History of ductal carcinoma in situ (DCIS) of breast Z86.000       Past Medical History:        Diagnosis Date    Abnormal finding on EKG 3/21/2013    Normal stress myoview 3/21/13     Arthritis     widespread, former ballerina    Degenerative disc 0.50     Years: 3.00     Pack years: 1.50     Quit date: 1991     Years since quittin.6    Smokeless tobacco: Never Used   Substance Use Topics    Alcohol use: Yes     Alcohol/week: 0.8 - 1.7 standard drinks     Types: 1 - 2 Standard drinks or equivalent per week     Comment: 6/week                                Counseling given: Not Answered      Vital Signs (Current): There were no vitals filed for this visit. BP Readings from Last 3 Encounters:   22 110/88   22 (!) 146/86   03/15/22 113/69       NPO Status:                                                                                 BMI:   Wt Readings from Last 3 Encounters:   22 219 lb (99.3 kg)   22 222 lb (100.7 kg)   03/15/22 222 lb (100.7 kg)     There is no height or weight on file to calculate BMI.    CBC:   Lab Results   Component Value Date    WBC 9.6 2019    RBC 4.54 2019    HGB 13.2 2019    HCT 39.3 2019    MCV 86.6 2019    RDW 13.4 2019     2019       CMP:   Lab Results   Component Value Date     2022    K 3.7 2022     2022    CO2 23 2022    BUN 15 2022    CREATININE 0.7 2022    GFRAA >60 2022    GFRAA 104 03/15/2013    AGRATIO 2.0 2019    LABGLOM >60 2022    GLUCOSE 97 2022    PROT 6.8 2019    PROT 7.5 2011    CALCIUM 9.3 2022    BILITOT 0.7 2019    ALKPHOS 55 2019    AST 21 2019    ALT 26 2019       POC Tests: No results for input(s): POCGLU, POCNA, POCK, POCCL, POCBUN, POCHEMO, POCHCT in the last 72 hours.     Coags:   Lab Results   Component Value Date    PROTIME 10.8 2017    INR 0.96 2017    APTT 25.9 03/15/2013       HCG (If Applicable): No results found for: PREGTESTUR, PREGSERUM, HCG, HCGQUANT     ABGs: No results found for: PHART, PO2ART, WCD3NOR, HHO6RRJ, BEART, R4NKONWT     Type & Screen (If Applicable):  No results found for: LABABO, LABRH    Drug/Infectious Status (If Applicable):  No results found for: HIV, HEPCAB    COVID-19 Screening (If Applicable):   Lab Results   Component Value Date    COVID19 Not Detected 03/15/2022    COVID19 Not Detected 01/06/2022           Anesthesia Evaluation  Patient summary reviewed and Nursing notes reviewed   history of anesthetic complications: PONV. Airway: Mallampati: II  TM distance: >3 FB   Neck ROM: full  Mouth opening: > = 3 FB   Dental: normal exam     Comment: Chipped bottom right molar    Pulmonary: breath sounds clear to auscultation  (+) sleep apnea:                             Cardiovascular:    (+) hypertension:,     (-) CABG/stent, dysrhythmias and  angina      Rhythm: regular  Rate: normal                    Neuro/Psych:   (+) headaches: migraine headaches,              ROS comment: Cervical spine fusion GI/Hepatic/Renal:            ROS comment: Occ gerd sx. None today or last night. Endo/Other:    (+) hypothyroidism: arthritis:., malignancy/cancer (DCIS). Abdominal:   (+) obese,           Vascular:   + DVT, . Other Findings:             Anesthesia Plan      general     ASA 3       Induction: intravenous. MIPS: Postoperative opioids intended and Prophylactic antiemetics administered. Anesthetic plan and risks discussed with patient. Use of blood products discussed with patient whom consented to blood products.                      Sahra Lagos MD   6/22/2022

## 2022-06-22 NOTE — BRIEF OP NOTE
Brief Postoperative Note      Patient: Macrina Chapman  YOB: 1960  MRN: [id number]    Date of Procedure: 6/22/2022    Pre-Op Diagnosis: Personal history of breast cancer [Z85.3]    Post-Op Diagnosis: Same       Procedure(s):  BILATERAL TISSUE EXPANDER REMOVAL, BILATERAL BREAST IMPLANT PLACEMENT    Surgeon(s):  Sudheer Sanford MD    Assistant:  Surgical Assistant: Ashleigh Randall    Anesthesia: General    Estimated Blood Loss (mL): less than 50     Complications: None    Specimens:   * No specimens in log *    Implants:  Implant Name Type Inv. Item Serial No.  Lot No. LRB No. Used Action   MENTOR ARTOURA  ULTRA HIGH PROFILE , SMOOTH     [id number] Right 1 Explanted   IMPLANT BRST 405CC DIA14.2CM P3.4CM JOEL GEL SMOOTH RND MOD - [id number]  IMPLANT BRST 405CC DIA14.2CM P3.4CM JOEL GEL SMOOTH RND MOD  MENTOR ISAÍAS-WD [id number] Right 1 Implanted   MENTOR ARTOURA ULTRA HIGH PROFILE , SMOOTH     [id number] Left 1 Explanted   IMPLANT BRST 405CC DIA14.2CM P3.4CM JOEL GEL SMOOTH RND MOD - [id number]  IMPLANT BRST 405CC DIA14.2CM P3.4CM JOEL GEL SMOOTH RND MOD  MENTOR ISAÍAS-WD [id number] Left 1 Implanted         Drains:   Closed/Suction Drain Inferior;Right Breast Bulb 15 [place] (Active)       Closed/Suction Drain Inferior; Left Breast Bulb 15 [place] (Active)       Findings: see dictation    Electronically signed by Cong Blank MD on 6/22/2022 at 9:25 AM

## 2022-06-22 NOTE — PROGRESS NOTES
Pt awake and alert at this time. Pt on RA, and VSS. Pt denies pain and nausea at this time. N/v subsided after Zofran, see MAR. Pt meets criteria to be discharged from Phase 1.

## 2022-06-22 NOTE — PROGRESS NOTES
Pt still experiencing waves of nausea but no vomiting when awake. Pt sts this is common for her. Denies pain. Pt main complaint is excessive sleepiness. This RN explained that medication px she has in place for n/v is causing this side effect. Pt v/u. At this point pt staying awake enough for d/c. Pt okay with this plan.

## 2022-07-06 ENCOUNTER — OFFICE VISIT (OUTPATIENT)
Dept: SURGERY | Age: 62
End: 2022-07-06
Payer: COMMERCIAL

## 2022-07-06 VITALS
HEIGHT: 66 IN | HEART RATE: 76 BPM | SYSTOLIC BLOOD PRESSURE: 128 MMHG | OXYGEN SATURATION: 99 % | RESPIRATION RATE: 18 BRPM | BODY MASS INDEX: 34.55 KG/M2 | DIASTOLIC BLOOD PRESSURE: 72 MMHG | WEIGHT: 215 LBS

## 2022-07-06 DIAGNOSIS — C50.912 PRIMARY CANCER OF LEFT FEMALE BREAST (HCC): Primary | ICD-10-CM

## 2022-07-06 DIAGNOSIS — Z85.3 ENCOUNTER FOR FOLLOW-UP SURVEILLANCE OF BREAST CANCER: ICD-10-CM

## 2022-07-06 DIAGNOSIS — Z08 ENCOUNTER FOR FOLLOW-UP SURVEILLANCE OF BREAST CANCER: ICD-10-CM

## 2022-07-06 DIAGNOSIS — Z80.3 FAMILY HISTORY OF BREAST CANCER: ICD-10-CM

## 2022-07-06 PROCEDURE — 99214 OFFICE O/P EST MOD 30 MIN: CPT | Performed by: NURSE PRACTITIONER

## 2022-07-06 NOTE — PROGRESS NOTES
[facility]  Surgical Breast Oncology      Medical Oncologist: Dr. Jeff Meyers Oncologist: N/A  Plastics: Fredi Winkler       CC: 3 months cancer f/u     pTisN0  STAGE:  0 left breast cancer     HPI: Edwige Day is a 60 y.o. woman here for 3 months follow up for breast check secondary to personal history of left breast cancer. She is s/p partial mastectomy followed by margin reexcision and ultimately completion mastectomy with SLNB, contralateral prophylactic mastectomy and reconstruction for left breast cancer. She underwent these procedures on 1/12/2022, 1/19/2022 and 3/15/2022. Underwent  bilateral tissue expander removal with bilateral breast implant placement with Dr. Frdei Winkler 6/22/2022. Perioperatively, limitations mainly driven by reconstructive surgery, requiring valium and narcotics for muscle spasms. Very upset and frustrated the surgical procedures have been ongoing longer than she was anticipating; did not expect to have ongoing procedures for 7 months. Most recently completed expander to implant placement on 6/22/2022, now frustrated as she has bilateral axillary/lateral chest wall swelling and can not place her arms at her side. She has been icing routinely. Meloxicam has helped. Has f/u with plastics today. Denies breast/chest wall masses, skin changes, color changes. Denies unintentional weight loss, bone pain, headaches and has no other systemic complaints. INTERVAL HX:  On 1/12/2022 she underwent left breast partial mastectomy. Pathology identified 5 mm intermediate grade ER positive DCIS. Additional cranial margin was taken at that time with focal DCIS identified and greater than 3 mm to the new margin. In the main specimen DCIS extended to the cranial margin but the new cranial margin showed disease with new margin greater than 3 mm. Additionally there was a separate focus of DCIS noted 1 mm to the medial margin.  [id number]      On 1/19/2022 she returned to the operating room for a left breast reexcision of medial margin. Additional residual DCIS was noted and extended focally to the new medial margin. [id number]      On 3/15/2022 she returned to the operating room for completion left mastectomy and sentinel lymph node biopsy with contralateral prophylactic right mastectomy with stage I reconstruction. Pathology of the left breast showed no residual DCIS. The right breast was negative. There is 0/3 lymph nodes involved with carcinoma. [id number]    On 6/22/2022 she underwent bilateral tissue expander removal with bilateral breast implant placement with Dr. Flor Hooper. Past Medical History:   Diagnosis Date    Abnormal finding on EKG 3/21/2013    Normal stress myoview 3/21/13     Arthritis     widespread, former ballerina    Degenerative disc disease, cervical     cervical  disc fusion C4-C7; Dr. Bingham Person    History of depression     History of DVT (deep vein thrombosis) 1991    s/p arthroscopy, left    Hypertension     Migraines     Parotid mass 12/4/2017    S/p excision 2/2018. Pleomorphic adenoma. Dr. Santosh NEVES (postoperative nausea and vomiting)     Sleep apnea     no cpap.  [facility]    Thyroid disease        Past Surgical History:   Procedure Laterality Date    BREAST BIOPSY Left 1/12/2022    LEFT LOCALIZED TWO - SITE EXCISIONAL BREAST BIOPSY performed by Gabriel March MD at [address] [place] [address] Bilateral 3/15/2022    BILATERAL BREAST TISSUE EXPANDER PLACEMENT performed by Rosy Herzog MD at [place] [address] Left 1/19/2022    LEFT BREAST MARGIN RE-EXCISION (77286-34) performed by Gabriel March MD at [place] [address] Bilateral 6/22/2022    BILATERAL TISSUE EXPANDER REMOVAL, BILATERAL BREAST IMPLANT PLACEMENT performed by Rosy Herzog MD at [facility]  2005    C4-7, decompressive anterior discectomies and foraminotomiesC4-7;Oren    HIP ARTHROPLASTY Right [id number]    RIGHT TOTAL HIP ARTHROPLASTY ANTERIOR APPROACH    HIP ARTHROPLASTY Left 2017    JOINT REPLACEMENT Left 2017    Dr. Bere Rangel ARTHROSCOPY      bilateral, multiple    GIANNA BREAST LOC ADDITIONAL LESION LEFT Left 2022    GIANNA BREAST LOC ADDITIONAL LESION LEFT 2022 [facility] [address]    GIANNA STEROTACTIC LOC BREAST BIOPSY LEFT Left 10/25/2021    GIANNA STEROTACTIC LOC BREAST BIOPSY LEFT 10/25/2021 [facility] [address]    MASTECTOMY Bilateral 3/15/2022    BILATERAL TOTAL MASTECTOMY; LEFT SENTINEL LYMPH NODE BIOPSY; TC-99, BLUE DYE performed by aLya Barahona MD at [place] [address]      left; Dr. Goran Barclay      left superficial parotidectomy, pleomorphic adenoma    TOTAL KNEE ARTHROPLASTY      left; Dr. Ivory Dixon Right 12    Dr. Sherrie Jackson       Family History   Problem Relation Age of Onset    Heart Attack Mother         68    Heart Failure Mother     Breast Cancer Mother     Hypertension Father     Cancer Father         fibrohistiosarcoma    Deep Vein Thrombosis Father     Atrial Fibrillation Sister     Hypertension Sister     Breast Cancer Sister     BRCA 2 Negative Sister     BRCA 1 Negative Sister     Sleep Apnea Sister     Deep Vein Thrombosis Sister     Breast Cancer Maternal Grandmother        Allergies as of 2022    (No Known Allergies)       Social History     Tobacco Use    Smoking status: Former Smoker     Packs/day: 0.50     Years: 3.00     Pack years: 1.50     Quit date: 1991     Years since quittin.6    Smokeless tobacco: Never Used   Vaping Use    Vaping Use: Not on file   Substance Use Topics    Alcohol use:  Yes     Alcohol/week: 0.8 - 1.7 standard drinks     Types: 1 - 2 Standard drinks or equivalent per week     Comment: 8-10/week (last drink 2022)    Drug use: No       Current Outpatient Medications on File Prior to Visit   Medication Sig Dispense Refill    meloxicam (MOBIC) 15 MG tablet 15 mg daily      levothyroxine (SYNTHROID) 75 MCG tablet TAKE ONE TABLET BY MOUTH DAILY ON AN EMPTY STOMACH TAKE WITH WATER AND WAIT 30 MINUTES BEFORE EATING OR TAKING OTHER MEDS, 90 tablet 0    amLODIPine (NORVASC) 10 MG tablet Take 1 tablet by mouth daily 90 tablet 0     No current facility-administered medications on file prior to visit. Medications: documentation has been reviewed in the electronic medical record and patient office intake form. REVIEW OF SYSTEMS:  Constitutional: Negative for fever  HENT: Negative for sore throat  Eyes: Negative for redness   Respiratory: Negative for dyspnea, cough  Cardiovascular: Negative for chest pain  Gastrointestinal: Negative for vomiting, diarrhea   Genitourinary: Negative for hematuria   Musculoskeletal: Negative for arthralgias   Skin: Negative for rash  Neurological: Negative for syncope  Hematological: Negative for adenopathy  Psychiatric/Behavorial: Negative for anxiety         PHYSICAL EXAM:  /72   Pulse 76   Resp 18   Ht 5' 6\" (1.676 m)   Wt 215 lb (97.5 kg)   SpO2 99%   BMI 34.70 kg/m²   Constitutional: She appearswell-nourished. No apparent distress. Breast: The patient was examined in the upright and supine position. Bilateral breasts have been surgically removed. Well-healing mastectomy scars. Expected postsurgical changes. No masses or skin changes. No concerning palpable findings. Implants are palpable. Increased swelling in bilateral axilla and lateral chest wall. Head: Normocephalic and atraumatic:   Eyes: EOM are normal. Pupils are equal, round, and reactive to light. Neck: Neck supple. No tracheal deviation present. No obvious mass. Lymphatics: No palpable supraclavicular, cervical, or axillary lymphadenopathy  Skin: No rash noted. No erythema. Neurologic: alert and oriented. Extremities: appear well perfused.

## 2022-11-02 RX ORDER — LEVOTHYROXINE SODIUM 0.07 MG/1
TABLET ORAL
Qty: 90 TABLET | Refills: 0 | Status: SHIPPED | OUTPATIENT
Start: 2022-11-02 | End: 2022-11-29 | Stop reason: SDUPTHER

## 2022-11-02 RX ORDER — AMLODIPINE BESYLATE 10 MG/1
10 TABLET ORAL DAILY
Qty: 90 TABLET | Refills: 0 | Status: SHIPPED | OUTPATIENT
Start: 2022-11-02 | End: 2022-11-29 | Stop reason: SDUPTHER

## 2022-11-28 PROBLEM — R73.03 PREDIABETES: Status: ACTIVE | Noted: 2022-11-28

## 2022-11-28 LAB
AVERAGE GLUCOSE: NORMAL
HBA1C MFR BLD: 6 %

## 2022-11-28 NOTE — PROGRESS NOTES
[facility]- Internal Medicine  Preoperative Consultation      2022    Heraclio Camp  :  1960    No chief complaint on file. HPI:  This patient presents to the office today for a preoperative consultation at the request of *** to assess stability of patient's medical condition(s) listed below. She will be having ***    Review of Systems  Known anesthesia problems: {ANESTHESIA PROBLEMS:}   Bleeding risk: {BLEEDING RISK:}  Personal or FH of DVT/PE: {NO/YES:[id number]::\"No\"}    Recent steroid use: {YES***/NO:60}  Exercise tolerance: ***  Can take care of self, such as eat, dress, or use the toilet (1 MET)  Can walk up a flight of steps or a hill or walk on level ground at 3 to 4 mph (4 METs)  Can do heavy work around the house, such as scrubbing floors or lifting or moving heavy furniture, or climb two flights of stairs (between 4 and 10 METs)  Can participate in strenuous sports such as swimming, singles tennis, football, basketball, and skiing (>10 METs)    Past Medical History:   Diagnosis Date    Abnormal finding on EKG 3/21/2013    Normal stress myoview 3/21/13     Arthritis     widespread, former ballerina    Degenerative disc disease, cervical     cervical  disc fusion C4-C7; Dr. Manish Campbell    History of depression     History of DVT (deep vein thrombosis)     s/p arthroscopy, left    Hypertension     Migraines     Parotid mass 2017    S/p excision 2018. Pleomorphic adenoma. Dr. Cecilia Cruz    PONV (postoperative nausea and vomiting)     Sleep apnea     no cpap.  [facility]    Thyroid disease      Past Surgical History:   Procedure Laterality Date    BREAST BIOPSY Left 2022    LEFT LOCALIZED TWO - SITE EXCISIONAL BREAST BIOPSY performed by Melba Samuels MD at [place] [address] Bilateral 03/15/2022    BILATERAL BREAST TISSUE EXPANDER PLACEMENT performed by Mishel Welsh MD at [facility] 2022 Alcohol use: Yes     Alcohol/week: 0.8 - 1.7 standard drinks     Types: 1 - 2 Standard drinks or equivalent per week     Comment: 8-10/week (last drink 6/19/2022)    Drug use: No     No Known Allergies    No outpatient medications have been marked as taking for the 11/29/22 encounter (Appointment) with Burnard Sandhoff, MD.       Physical Exam:  There were no vitals taken for this visit. Physical Exam  EKG Interpretation:  {ekg findings:70385}. Lab Review: {Recent labs:49131}     ASSESSMENT/PLAN:   Instability of left knee joint  Pre-op evaluation  Essential hypertension        Pt advised to avoid NSAID's, OTC vitamin supplements and fish oil 1 week prior to procedure.       Burnard Sandhoff, MD

## 2022-11-29 ENCOUNTER — OFFICE VISIT (OUTPATIENT)
Dept: INTERNAL MEDICINE CLINIC | Age: 62
End: 2022-11-29
Payer: COMMERCIAL

## 2022-11-29 VITALS
DIASTOLIC BLOOD PRESSURE: 92 MMHG | SYSTOLIC BLOOD PRESSURE: 142 MMHG | BODY MASS INDEX: 34.7 KG/M2 | HEART RATE: 89 BPM | WEIGHT: 215 LBS | RESPIRATION RATE: 14 BRPM | OXYGEN SATURATION: 98 %

## 2022-11-29 DIAGNOSIS — Z22.321 COLONIZATION WITH MSSA (METHICILLIN-SUSCEPTIBLE STAPHYLOCOCCUS AUREUS): ICD-10-CM

## 2022-11-29 DIAGNOSIS — I10 ESSENTIAL HYPERTENSION: Chronic | ICD-10-CM

## 2022-11-29 DIAGNOSIS — Z01.818 PRE-OP EVALUATION: ICD-10-CM

## 2022-11-29 DIAGNOSIS — M25.362 INSTABILITY OF LEFT KNEE JOINT: Primary | ICD-10-CM

## 2022-11-29 DIAGNOSIS — Z20.828 EXPOSURE TO THE FLU: ICD-10-CM

## 2022-11-29 PROCEDURE — 3074F SYST BP LT 130 MM HG: CPT | Performed by: INTERNAL MEDICINE

## 2022-11-29 PROCEDURE — 99214 OFFICE O/P EST MOD 30 MIN: CPT | Performed by: INTERNAL MEDICINE

## 2022-11-29 PROCEDURE — 90674 CCIIV4 VAC NO PRSV 0.5 ML IM: CPT | Performed by: INTERNAL MEDICINE

## 2022-11-29 PROCEDURE — 90471 IMMUNIZATION ADMIN: CPT | Performed by: INTERNAL MEDICINE

## 2022-11-29 PROCEDURE — 3078F DIAST BP <80 MM HG: CPT | Performed by: INTERNAL MEDICINE

## 2022-11-29 RX ORDER — LEVOTHYROXINE SODIUM 0.07 MG/1
TABLET ORAL
Qty: 90 TABLET | Refills: 0 | Status: SHIPPED | OUTPATIENT
Start: 2022-11-29

## 2022-11-29 RX ORDER — AMLODIPINE BESYLATE 10 MG/1
10 TABLET ORAL DAILY
Qty: 90 TABLET | Refills: 1 | Status: SHIPPED | OUTPATIENT
Start: 2022-11-29

## 2022-11-29 RX ORDER — OSELTAMIVIR PHOSPHATE 75 MG/1
CAPSULE ORAL
Qty: 10 CAPSULE | Refills: 0 | Status: SHIPPED | OUTPATIENT
Start: 2022-11-29

## 2022-11-29 ASSESSMENT — ENCOUNTER SYMPTOMS
COUGH: 0
SHORTNESS OF BREATH: 0

## 2022-11-29 NOTE — PROGRESS NOTES
[facility]- Internal Medicine  Preoperative Consultation      2022    Nereida Metzger  :  1960    Chief Complaint   Patient presents with    Pre-op Exam     REVISION LEFT TOTAL KNEE ARTHROPLASTY 22 @ [address] Artie Michaud Fax # [phone]        HPI:  This patient presents to the office today for a preoperative consultation at the request of Dr. Artie Michaud to assess stability of patient's medical condition(s) listed below. She will be having revision of left total knee arthroplasty on 2022. Ongoing issues with left knee, instability of the joint. Review of Systems   Constitutional:  Negative for chills and fever. Respiratory:  Negative for cough and shortness of breath. Cardiovascular:  Negative for chest pain and palpitations. Skin:  Negative for rash. Hematological:  Does not bruise/bleed easily. Known anesthesia problems: nausea   Bleeding risk: No recent or remote history of abnormal bleeding  Personal or FH of DVT/PE: Yes -  family hx of DVT. Patient also has had hx of DVT post surgical 1990. Prophylaxis for ortho procedures   Recent steroid use: no  Exercise tolerance before surgery at least 4 METS (Can walk up a flight of steps or a hill, raking  leaves, weeding, pushing a power mower or walk on level ground at 3 to 4 mph): Yes   Patient objection to receiving blood products: No    Past Medical History:   Diagnosis Date    Abnormal finding on EKG 3/21/2013    Normal stress myoview 3/21/13     Acquired hypothyroidism 2012    Arthritis     widespread, former ballerina    Degenerative disc disease, cervical     cervical  disc fusion C4-C7; Dr. Kishor Nunez    Depression 12/10/2018    History of depression     History of ductal carcinoma in situ (DCIS) of breast 3/2/2022    S/p bilateral mastectomy 3/2022.   Negative genetic screening [facility]    History of DVT (deep vein thrombosis)     s/p arthroscopy, left    Hypercholesterolemia 2020 The 10-year ASCVD risk score (Donavon Jacobs, et al., 2013) is: 4.3%   Values used to calculate the score:     Age: 63 years     Sex: Female     Is Non- : No     Diabetic: No     Tobacco smoker: No     Systolic Blood Pressure: 641 mmHg     Is BP treated: Yes     HDL Cholesterol: 51 mg/dL     Total Cholesterol: 225 mg/dL     Hypertension     Migraines     Parotid mass 12/4/2017    S/p excision 2/2018. Pleomorphic adenoma. Dr. Taurus NEVES (postoperative nausea and vomiting)     Sleep apnea     no cpap.  [facility]    Thyroid disease      Past Surgical History:   Procedure Laterality Date    BREAST BIOPSY Left 01/12/2022    LEFT LOCALIZED TWO - SITE EXCISIONAL BREAST BIOPSY performed by Dayami Andrea MD at [place] [address] Bilateral 03/15/2022    BILATERAL BREAST TISSUE EXPANDER PLACEMENT performed by Delio Miller MD at [facility] Left 01/19/2022    LEFT BREAST MARGIN RE-EXCISION ([id number]) performed by Dayami Andrea MD at [facility] Bilateral 06/22/2022    BILATERAL TISSUE EXPANDER REMOVAL, BILATERAL BREAST IMPLANT PLACEMENT performed by Delio Miller MD at [address] [place] [address],[address]  2005    C4-7, decompressive anterior discectomies and foraminotomiesC4-7;Bohinski    HIP ARTHROPLASTY Right 09/05/2012    RIGHT TOTAL HIP ARTHROPLASTY ANTERIOR APPROACH    HIP ARTHROPLASTY Left 06/2017    KNEE ARTHROSCOPY      bilateral, multiple    GIANNA BREAST LOC ADDITIONAL LESION LEFT Left 01/06/2022    GIANNA BREAST LOC ADDITIONAL LESION LEFT 1/6/2022 [facility] [address]    GIANNA STEROTACTIC LOC BREAST BIOPSY LEFT Left 10/25/2021    GIANNA STEROTACTIC LOC BREAST BIOPSY LEFT 10/25/2021 [facility] [address] Bilateral 03/15/2022    BILATERAL TOTAL MASTECTOMY; LEFT SENTINEL LYMPH NODE BIOPSY; TC-99, BLUE DYE performed by Dayami Andrea MD at [address] [place] [address] left; Dr. Sergio Carreon      left superficial parotidectomy, pleomorphic adenoma    TOTAL KNEE ARTHROPLASTY  2009    left; Dr. Kell Andrea Right 2012    Dr. Rusty Hitchcock     Family History   Problem Relation Age of Onset    Heart Attack Mother         68    Heart Failure Mother     Breast Cancer Mother     Hypertension Father     Cancer Father         fibrohistiosarcoma    Deep Vein Thrombosis Father     Atrial Fibrillation Sister     Hypertension Sister     Breast Cancer Sister     BRCA 2 Negative Sister     BRCA 1 Negative Sister     Sleep Apnea Sister     Deep Vein Thrombosis Sister     Breast Cancer Maternal Grandmother      Social History     Tobacco Use    Smoking status: Former     Packs/day: 0.50     Years: 3.00     Pack years: 1.50     Types: Cigarettes     Quit date: 1991     Years since quittin.0    Smokeless tobacco: Never   Substance Use Topics    Alcohol use: Yes     Alcohol/week: 0.8 - 1.7 standard drinks     Types: 1 - 2 Standard drinks or equivalent per week     Comment: 8-10/week (last drink 2022)    Drug use: No     No Known Allergies    Outpatient Medications Marked as Taking for the 22 encounter (Office Visit) with Alejandro Arredondo MD   Medication Sig Dispense Refill    levothyroxine (SYNTHROID) 75 MCG tablet TAKE ONE TABLET BY MOUTH DAILY ON AN EMPTY STOMACH TAKE WITH WATER AND WAIT 30 MINUTES BEFORE EATING OR TAKING OTHER MEDS, 90 tablet 0    amLODIPine (NORVASC) 10 MG tablet Take 1 tablet by mouth daily 90 tablet 1    mupirocin (BACTROBAN) 2 % ointment Apply topically side nares tid for 5 days. 22 g 0    oseltamivir (TAMIFLU) 75 MG capsule Take 1 tablet daily through exposure and 5-7 days after last exposure.  10 capsule 0    meloxicam (MOBIC) 15 MG tablet 15 mg daily         Physical Exam:  BP (!) 142/92   Pulse 89   Resp 14   Wt 215 lb (97.5 kg)   SpO2 98%   BMI 34.70 kg/m²   Physical Exam  Constitutional: Appearance: Normal appearance. Eyes:      Pupils: Pupils are equal, round, and reactive to light. Neck:      Vascular: No carotid bruit. Cardiovascular:      Rate and Rhythm: Normal rate and regular rhythm. Heart sounds: No murmur heard. Pulmonary:      Effort: Pulmonary effort is normal.      Breath sounds: Normal breath sounds. Abdominal:      General: Bowel sounds are normal.      Palpations: Abdomen is soft. Tenderness: There is no abdominal tenderness. Musculoskeletal:      Right lower leg: No edema. Left lower leg: No edema. Lymphadenopathy:      Cervical: No cervical adenopathy. Skin:     Findings: Lesion (scab over 2nd,3rd mcp left hand with faint surrounding redness, resolving wound right thumb, also just faint redness) present. No rash. Neurological:      General: No focal deficit present. Mental Status: She is oriented to person, place, and time. EKG Interpretation:  not indicated. EKG 6/13/2022 unchanged from prior  Lab Review: Done at [facility]     ASSESSMENT/PLAN:   Instability of left knee joint  Comments: We will proceed with planned surgical repair per Dr. Althea Glover  Pre-op evaluation  Comments:  Medically stable with no contraindication to surgery. Off meloxicam as of today. Essential hypertension  Assessment & Plan:  Elevated blood pressure today, due to being out of medication. Restart amlodipine. Can take with sip of water on morning of surgery. Exposure to the flu  Comments:  Asymptomatic. Prophylactic Tamiflu due to exposure. Colonization with MSSA (methicillin-susceptible Staphylococcus aureus)  Comments:  Mupirocin ointment to nares 3 times daily for 5 days. Can also use on the resolving skin lesions on her hands. Pt advised to avoid NSAID's, OTC vitamin supplements and fish oil 1 week prior to procedure.       Vandana Licea MD

## 2022-11-30 NOTE — ASSESSMENT & PLAN NOTE
Elevated blood pressure today, due to being out of medication. Restart amlodipine. Can take with sip of water on morning of surgery.

## 2023-01-27 ENCOUNTER — OFFICE VISIT (OUTPATIENT)
Dept: INTERNAL MEDICINE CLINIC | Age: 63
End: 2023-01-27
Payer: COMMERCIAL

## 2023-01-27 VITALS
OXYGEN SATURATION: 98 % | BODY MASS INDEX: 33.41 KG/M2 | WEIGHT: 207 LBS | SYSTOLIC BLOOD PRESSURE: 138 MMHG | DIASTOLIC BLOOD PRESSURE: 70 MMHG | HEART RATE: 72 BPM

## 2023-01-27 DIAGNOSIS — R73.03 PREDIABETES: ICD-10-CM

## 2023-01-27 DIAGNOSIS — F33.9 EPISODE OF RECURRENT MAJOR DEPRESSIVE DISORDER, UNSPECIFIED DEPRESSION EPISODE SEVERITY (HCC): ICD-10-CM

## 2023-01-27 DIAGNOSIS — I10 ESSENTIAL HYPERTENSION: Primary | Chronic | ICD-10-CM

## 2023-01-27 DIAGNOSIS — E78.00 HYPERCHOLESTEROLEMIA: ICD-10-CM

## 2023-01-27 DIAGNOSIS — E03.9 ACQUIRED HYPOTHYROIDISM: Chronic | ICD-10-CM

## 2023-01-27 DIAGNOSIS — Z51.81 MEDICATION MONITORING ENCOUNTER: ICD-10-CM

## 2023-01-27 PROCEDURE — 90715 TDAP VACCINE 7 YRS/> IM: CPT | Performed by: INTERNAL MEDICINE

## 2023-01-27 PROCEDURE — 3075F SYST BP GE 130 - 139MM HG: CPT | Performed by: INTERNAL MEDICINE

## 2023-01-27 PROCEDURE — 90471 IMMUNIZATION ADMIN: CPT | Performed by: INTERNAL MEDICINE

## 2023-01-27 PROCEDURE — 99214 OFFICE O/P EST MOD 30 MIN: CPT | Performed by: INTERNAL MEDICINE

## 2023-01-27 PROCEDURE — 3078F DIAST BP <80 MM HG: CPT | Performed by: INTERNAL MEDICINE

## 2023-01-27 RX ORDER — OXYCODONE HYDROCHLORIDE 5 MG/1
5 TABLET ORAL EVERY 4 HOURS PRN
COMMUNITY

## 2023-01-27 ASSESSMENT — PATIENT HEALTH QUESTIONNAIRE - PHQ9: DEPRESSION UNABLE TO ASSESS: PT REFUSES

## 2023-01-27 NOTE — ASSESSMENT & PLAN NOTE
Recurrent symptoms, no suicidality. Previously did well on sertraline. Start sertraline 25 mg half tab for 1 week, then increasing to 50 mg. Patient instructed to contact office when due for refill with update on current status.

## 2023-01-27 NOTE — ASSESSMENT & PLAN NOTE
Asymptomatic. Still recovering from knee replacement. Looking forward to resuming more routine activity. Continue to work on lifestyle.

## 2023-01-27 NOTE — PROGRESS NOTES
Brody Jorgensen   :  1960  2023    ASSESSMENT/PLAN:   1. Essential hypertension  Assessment & Plan:  Borderline control, recent major stressors. Continue amlodipine 10 mg, patient able to resume swimming, and addressing depression prior to considering increase her blood pressure medicine. 2. Episode of recurrent major depressive disorder, unspecified depression episode severity ([address]  Assessment & Plan:  Recurrent symptoms, no suicidality. Previously did well on sertraline. Start sertraline 25 mg half tab for 1 week, then increasing to 50 mg. Patient instructed to contact office when due for refill with update on current status. 3. Acquired hypothyroidism  Assessment & Plan:  Stable. Check TSH and adjust dose if indicated   Orders:  -     TSH with Reflex; Future  4. Prediabetes  Assessment & Plan:  Asymptomatic. Still recovering from knee replacement. Looking forward to resuming more routine activity. Continue to work on lifestyle. Orders:  -     Hemoglobin A1C; Future  5. Hypercholesterolemia  Assessment & Plan:  Asymptomatic, currently manages with lifestyle. Recheck today. Orders:  -     Lipid Panel; Future  6. Medication monitoring encounter  -     Comprehensive Metabolic Panel; Future  -     CBC; Future      Return in about 6 months (around 2023). SUBJECTIVE     60 y.o. female established patient here for:   Chief Complaint   Patient presents with    Follow-up     BP & thyroid    Depression     Had revision left knee replacement. Planned to return to work tomorrow but [employer] be able. Probable another 2 weeks off. Back on meloxicam daily. No home blood pressure readings. Under stress. Mother  and dog have both  since here last. Mom was 80. Feeling depressed again. Just overwhelmed currently. Feels like she just cannot deal with another decision. Denies feeling that she would better off dead or wanting to hurt her self.   She does not want to take the PHQ-9 today.    Review of Systems   Constitutional:  Negative for chills and fever. Outpatient Medications Marked as Taking for the 1/27/23 encounter (Office Visit) with Dylon Mcintyre MD   Medication Sig Dispense Refill    oxyCODONE (ROXICODONE) 5 MG immediate release tablet Take 5 mg by mouth every 4 hours as needed for Pain. sertraline (ZOLOFT) 50 MG tablet Take 1/2 tab daily for 1 week, then whole tab daily. 30 tablet 1    levothyroxine (SYNTHROID) 75 MCG tablet TAKE ONE TABLET BY MOUTH DAILY ON AN EMPTY STOMACH TAKE WITH WATER AND WAIT 30 MINUTES BEFORE EATING OR TAKING OTHER MEDS, 90 tablet 0    amLODIPine (NORVASC) 10 MG tablet Take 1 tablet by mouth daily 90 tablet 1    meloxicam (MOBIC) 15 MG tablet 15 mg daily         OBJECTIVE:  Vitals:    01/27/23 0930   BP: 138/70   Site: Right Upper Arm   Position: Sitting   Cuff Size: Medium Adult   Pulse: 72   SpO2: 98%   Weight: 207 lb (93.9 kg)     Physical Exam  Cardiovascular:      Rate and Rhythm: Normal rate and regular rhythm. Heart sounds: Normal heart sounds. Pulmonary:      Effort: Pulmonary effort is normal.      Breath sounds: Normal breath sounds. Musculoskeletal:      Right lower leg: No edema. Left lower leg: No edema. Psychiatric:         Thought Content: Thought content does not include suicidal ideation. Thought content does not include suicidal plan. Comments: Depressed mood       This note was generated completely or in part utilizing Dragon dictation speech recognition software. Occasionally, words are mistranscribed and despite editing, the text may contain inaccuracies due to incorrect word recognition.   If further clarification is needed please contact the office at [phone]  --Dylon Mcintyre MD

## 2023-01-27 NOTE — ASSESSMENT & PLAN NOTE
Borderline control, recent major stressors.  Continue amlodipine 10 mg, patient able to resume swimming, and addressing depression prior to considering increase her blood pressure medicine.

## 2023-01-28 LAB
A/G RATIO: 1.7 (ref 1.1–2.2)
ALBUMIN SERPL-MCNC: 4.3 G/DL (ref 3.4–5)
ALP BLD-CCNC: 76 U/L (ref 40–129)
ALT SERPL-CCNC: 31 U/L (ref 10–40)
ANION GAP SERPL CALCULATED.3IONS-SCNC: 13 MMOL/L (ref 3–16)
AST SERPL-CCNC: 26 U/L (ref 15–37)
BILIRUB SERPL-MCNC: 0.4 MG/DL (ref 0–1)
BUN BLDV-MCNC: 16 MG/DL (ref 7–20)
CALCIUM SERPL-MCNC: 9.8 MG/DL (ref 8.3–10.6)
CHLORIDE BLD-SCNC: 101 MMOL/L (ref 99–110)
CHOLESTEROL, TOTAL: 206 MG/DL (ref 0–199)
CO2: 28 MMOL/L (ref 21–32)
CREAT SERPL-MCNC: 0.7 MG/DL (ref 0.6–1.2)
ESTIMATED AVERAGE GLUCOSE: 105.4 MG/DL
GFR SERPL CREATININE-BSD FRML MDRD: >60 ML/MIN/{1.73_M2}
GLUCOSE BLD-MCNC: 106 MG/DL (ref 70–99)
HBA1C MFR BLD: 5.3 %
HCT VFR BLD CALC: 43.2 % (ref 36–48)
HDLC SERPL-MCNC: 47 MG/DL (ref 40–60)
HEMOGLOBIN: 13.9 G/DL (ref 12–16)
LDL CHOLESTEROL CALCULATED: 110 MG/DL
MCH RBC QN AUTO: 27.8 PG (ref 26–34)
MCHC RBC AUTO-ENTMCNC: 32.2 G/DL (ref 31–36)
MCV RBC AUTO: 86.4 FL (ref 80–100)
PDW BLD-RTO: 13.9 % (ref 12.4–15.4)
PLATELET # BLD: 256 K/UL (ref 135–450)
PMV BLD AUTO: 8.5 FL (ref 5–10.5)
POTASSIUM SERPL-SCNC: 3.9 MMOL/L (ref 3.5–5.1)
RBC # BLD: 4.99 M/UL (ref 4–5.2)
SODIUM BLD-SCNC: 142 MMOL/L (ref 136–145)
TOTAL PROTEIN: 6.8 G/DL (ref 6.4–8.2)
TRIGL SERPL-MCNC: 243 MG/DL (ref 0–150)
TSH REFLEX: 1.74 UIU/ML (ref 0.27–4.2)
VLDLC SERPL CALC-MCNC: 49 MG/DL
WBC # BLD: 6.9 K/UL (ref 4–11)

## 2023-05-01 ENCOUNTER — OFFICE VISIT (OUTPATIENT)
Dept: INTERNAL MEDICINE CLINIC | Age: 63
End: 2023-05-01
Payer: COMMERCIAL

## 2023-05-01 ENCOUNTER — NURSE ONLY (OUTPATIENT)
Dept: INTERNAL MEDICINE CLINIC | Age: 63
End: 2023-05-01

## 2023-05-01 VITALS — WEIGHT: 213 LBS | BODY MASS INDEX: 34.38 KG/M2

## 2023-05-01 VITALS
SYSTOLIC BLOOD PRESSURE: 126 MMHG | HEART RATE: 75 BPM | WEIGHT: 216 LBS | OXYGEN SATURATION: 97 % | DIASTOLIC BLOOD PRESSURE: 80 MMHG | BODY MASS INDEX: 34.86 KG/M2

## 2023-05-01 DIAGNOSIS — H81.10 BENIGN PAROXYSMAL POSITIONAL VERTIGO, UNSPECIFIED LATERALITY: ICD-10-CM

## 2023-05-01 DIAGNOSIS — I10 ESSENTIAL HYPERTENSION: Primary | Chronic | ICD-10-CM

## 2023-05-01 DIAGNOSIS — F33.9 EPISODE OF RECURRENT MAJOR DEPRESSIVE DISORDER, UNSPECIFIED DEPRESSION EPISODE SEVERITY (HCC): ICD-10-CM

## 2023-05-01 PROCEDURE — 3079F DIAST BP 80-89 MM HG: CPT | Performed by: INTERNAL MEDICINE

## 2023-05-01 PROCEDURE — 99214 OFFICE O/P EST MOD 30 MIN: CPT | Performed by: INTERNAL MEDICINE

## 2023-05-01 PROCEDURE — 3074F SYST BP LT 130 MM HG: CPT | Performed by: INTERNAL MEDICINE

## 2023-05-01 RX ORDER — MELOXICAM 15 MG/1
15 TABLET ORAL DAILY
Qty: 30 TABLET | Refills: 3 | Status: SHIPPED | OUTPATIENT
Start: 2023-05-01

## 2023-05-01 ASSESSMENT — PATIENT HEALTH QUESTIONNAIRE - PHQ9
3. TROUBLE FALLING OR STAYING ASLEEP: 3
1. LITTLE INTEREST OR PLEASURE IN DOING THINGS: 0
SUM OF ALL RESPONSES TO PHQ9 QUESTIONS 1 & 2: 1
2. FEELING DOWN, DEPRESSED OR HOPELESS: 1
5. POOR APPETITE OR OVEREATING: 0
6. FEELING BAD ABOUT YOURSELF - OR THAT YOU ARE A FAILURE OR HAVE LET YOURSELF OR YOUR FAMILY DOWN: 0
8. MOVING OR SPEAKING SO SLOWLY THAT OTHER PEOPLE COULD HAVE NOTICED. OR THE OPPOSITE, BEING SO FIGETY OR RESTLESS THAT YOU HAVE BEEN MOVING AROUND A LOT MORE THAN USUAL: 0
SUM OF ALL RESPONSES TO PHQ QUESTIONS 1-9: 4
9. THOUGHTS THAT YOU WOULD BE BETTER OFF DEAD, OR OF HURTING YOURSELF: 0
SUM OF ALL RESPONSES TO PHQ QUESTIONS 1-9: 4
4. FEELING TIRED OR HAVING LITTLE ENERGY: 0
10. IF YOU CHECKED OFF ANY PROBLEMS, HOW DIFFICULT HAVE THESE PROBLEMS MADE IT FOR YOU TO DO YOUR WORK, TAKE CARE OF THINGS AT HOME, OR GET ALONG WITH OTHER PEOPLE: 0
7. TROUBLE CONCENTRATING ON THINGS, SUCH AS READING THE NEWSPAPER OR WATCHING TELEVISION: 0
SUM OF ALL RESPONSES TO PHQ QUESTIONS 1-9: 4
SUM OF ALL RESPONSES TO PHQ QUESTIONS 1-9: 4

## 2023-05-01 ASSESSMENT — ENCOUNTER SYMPTOMS
VOMITING: 0
NAUSEA: 1

## 2023-05-01 NOTE — ASSESSMENT & PLAN NOTE
Never took sertraline prescribed in January. Was doing better after getting a puppy but now in current situation feeling like she may need to restart.

## 2023-05-01 NOTE — PROGRESS NOTES
Petrona Klein   :  1960  2023    ASSESSMENT/PLAN:   1. Essential hypertension  Assessment & Plan:  Controlled but need to monitor when back on meloxicam routinely. Continue amlodipine 10 mg daily. 2. Benign paroxysmal positional vertigo, unspecified laterality  Comments:  Explained diagnosis and natural history. Provided instruction for [place] at home. To call for PT referral if not improving. 3. Episode of recurrent major depressive disorder, unspecified depression episode severity ([address]  Assessment & Plan:  Never took sertraline prescribed in January. Was doing better after getting a puppy but now in current situation feeling like she may need to restart. Return in about 4 months (around 2023). SUBJECTIVE     58 y.o. female established patient here for:   Chief Complaint   Patient presents with    Dizziness     Last night, lightheaded while cooking dinner. Had to go lay down. Felt borderline vertiginous like what she had in the past.     Chest hasn't felt the same since mastectomy and reconstruction, but not different recently. Need refill on meloxicam. Uses for arthritis. Never started on the sertraline, was better after adopted puppy, but now starting to think needs it again. 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 Review of Systems   Eyes:  Negative for visual disturbance. Gastrointestinal:  Positive for nausea. Negative for vomiting. Neurological:  Negative for headaches.      Outpatient Medications Marked as Taking for the 23 encounter (Office Visit) with Jeanna Robbins MD   Medication Sig Dispense Refill    meloxicam (MOBIC) 15 MG tablet Take 1 tablet by mouth daily 30 tablet 3    levothyroxine (SYNTHROID) 75 MCG tablet TAKE ONE TABLET BY MOUTH DAILY ON AN EMPTY STOMACH TAKE WITH WATER AND WAIT 30 MINUTES BEFORE EATING OR TAKING OTHER MEDS, 90 tablet 0    amLODIPine (NORVASC) 10 MG tablet

## 2023-07-17 ENCOUNTER — TELEPHONE (OUTPATIENT)
Dept: INTERNAL MEDICINE CLINIC | Age: 63
End: 2023-07-17

## 2023-07-17 ENCOUNTER — PATIENT MESSAGE (OUTPATIENT)
Dept: INTERNAL MEDICINE CLINIC | Age: 63
End: 2023-07-17

## 2023-07-17 DIAGNOSIS — R05.1 ACUTE COUGH: Primary | ICD-10-CM

## 2023-07-17 NOTE — TELEPHONE ENCOUNTER
From: Qasim Kenyon  To: Dr. Maricel Ramey  Sent: 7/17/2023 9:05 AM EDT  Subject: Maximilian Posey as a dog    Hi Dr. Arina Yeh:    I am sick as a dog with a very nasty cough. I was up most of the night hacking up a lung so to speak. 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 The office stated they will not prescribe anything without an evaluation, but I just can't take time off of work so hoping you will get this message and take pitty on me. [address] on file is fine.      Thanks,   [place]

## 2023-07-17 NOTE — TELEPHONE ENCOUNTER
Patient called the office requesting a cough medicine be sent in, for the \"terrible cough\" she has. Advised patient that we do not prescribe medication without evaluating pts. I requested she do an e-visit. Patient states she can't do an e-visit because she is at work. Advised patient that the e-visit is just that can be completed anywhere. Patient said \"just forget it\" & hung up.

## 2023-07-20 NOTE — TELEPHONE ENCOUNTER
Please call her and see if she wants to come in at 7:45 tomorrow am. Other option would be to do virtual visit today at 5:15 or 5:30 today. Text me know if she schedules for any of these options.

## 2023-12-15 ENCOUNTER — OFFICE VISIT (OUTPATIENT)
Dept: INTERNAL MEDICINE CLINIC | Age: 63
End: 2023-12-15
Payer: COMMERCIAL

## 2023-12-15 VITALS
HEIGHT: 66 IN | OXYGEN SATURATION: 97 % | DIASTOLIC BLOOD PRESSURE: 84 MMHG | BODY MASS INDEX: 35.53 KG/M2 | WEIGHT: 221.1 LBS | HEART RATE: 86 BPM | SYSTOLIC BLOOD PRESSURE: 138 MMHG

## 2023-12-15 DIAGNOSIS — E03.9 ACQUIRED HYPOTHYROIDISM: Chronic | ICD-10-CM

## 2023-12-15 DIAGNOSIS — R73.03 PREDIABETES: ICD-10-CM

## 2023-12-15 DIAGNOSIS — F33.9 EPISODE OF RECURRENT MAJOR DEPRESSIVE DISORDER, UNSPECIFIED DEPRESSION EPISODE SEVERITY (HCC): ICD-10-CM

## 2023-12-15 DIAGNOSIS — I10 ESSENTIAL HYPERTENSION: Primary | Chronic | ICD-10-CM

## 2023-12-15 DIAGNOSIS — E78.00 HYPERCHOLESTEROLEMIA: ICD-10-CM

## 2023-12-15 LAB
ALBUMIN SERPL-MCNC: 4.6 G/DL (ref 3.4–5)
ALBUMIN/GLOB SERPL: 2 {RATIO} (ref 1.1–2.2)
ALP SERPL-CCNC: 68 U/L (ref 40–129)
ALT SERPL-CCNC: 55 U/L (ref 10–40)
ANION GAP SERPL CALCULATED.3IONS-SCNC: 11 MMOL/L (ref 3–16)
AST SERPL-CCNC: 41 U/L (ref 15–37)
BILIRUB SERPL-MCNC: 0.7 MG/DL (ref 0–1)
BUN SERPL-MCNC: 13 MG/DL (ref 7–20)
CALCIUM SERPL-MCNC: 9.3 MG/DL (ref 8.3–10.6)
CHLORIDE SERPL-SCNC: 101 MMOL/L (ref 99–110)
CHOLEST SERPL-MCNC: 208 MG/DL (ref 0–199)
CO2 SERPL-SCNC: 29 MMOL/L (ref 21–32)
CREAT SERPL-MCNC: 0.9 MG/DL (ref 0.6–1.2)
GFR SERPLBLD CREATININE-BSD FMLA CKD-EPI: >60 ML/MIN/{1.73_M2}
GLUCOSE SERPL-MCNC: 111 MG/DL (ref 70–99)
HDLC SERPL-MCNC: 45 MG/DL (ref 40–60)
LDLC SERPL CALC-MCNC: 113 MG/DL
POTASSIUM SERPL-SCNC: 3.9 MMOL/L (ref 3.5–5.1)
PROT SERPL-MCNC: 6.9 G/DL (ref 6.4–8.2)
SODIUM SERPL-SCNC: 141 MMOL/L (ref 136–145)
TRIGL SERPL-MCNC: 251 MG/DL (ref 0–150)
TSH SERPL DL<=0.005 MIU/L-ACNC: 3.22 UIU/ML (ref 0.27–4.2)
VLDLC SERPL CALC-MCNC: 50 MG/DL

## 2023-12-15 PROCEDURE — 3075F SYST BP GE 130 - 139MM HG: CPT | Performed by: INTERNAL MEDICINE

## 2023-12-15 PROCEDURE — 90674 CCIIV4 VAC NO PRSV 0.5 ML IM: CPT | Performed by: INTERNAL MEDICINE

## 2023-12-15 PROCEDURE — 3078F DIAST BP <80 MM HG: CPT | Performed by: INTERNAL MEDICINE

## 2023-12-15 PROCEDURE — 99214 OFFICE O/P EST MOD 30 MIN: CPT | Performed by: INTERNAL MEDICINE

## 2023-12-15 PROCEDURE — 90471 IMMUNIZATION ADMIN: CPT | Performed by: INTERNAL MEDICINE

## 2023-12-15 RX ORDER — AMLODIPINE BESYLATE 10 MG/1
10 TABLET ORAL DAILY
Qty: 90 TABLET | Refills: 1 | Status: SHIPPED | OUTPATIENT
Start: 2023-12-15

## 2023-12-15 RX ORDER — LEVOTHYROXINE SODIUM 0.07 MG/1
TABLET ORAL
Qty: 90 TABLET | Refills: 0 | Status: SHIPPED | OUTPATIENT
Start: 2023-12-15

## 2023-12-15 SDOH — ECONOMIC STABILITY: TRANSPORTATION INSECURITY
IN THE PAST 12 MONTHS, HAS LACK OF TRANSPORTATION KEPT YOU FROM MEETINGS, WORK, OR FROM GETTING THINGS NEEDED FOR DAILY LIVING?: PATIENT DECLINED

## 2023-12-15 SDOH — ECONOMIC STABILITY: FOOD INSECURITY: WITHIN THE PAST 12 MONTHS, THE FOOD YOU BOUGHT JUST DIDN'T LAST AND YOU DIDN'T HAVE MONEY TO GET MORE.: PATIENT DECLINED

## 2023-12-15 SDOH — ECONOMIC STABILITY: FOOD INSECURITY: WITHIN THE PAST 12 MONTHS, YOU WORRIED THAT YOUR FOOD WOULD RUN OUT BEFORE YOU GOT MONEY TO BUY MORE.: PATIENT DECLINED

## 2023-12-15 SDOH — ECONOMIC STABILITY: HOUSING INSECURITY
IN THE LAST 12 MONTHS, WAS THERE A TIME WHEN YOU DID NOT HAVE A STEADY PLACE TO SLEEP OR SLEPT IN A SHELTER (INCLUDING NOW)?: PATIENT REFUSED

## 2023-12-15 SDOH — ECONOMIC STABILITY: INCOME INSECURITY: HOW HARD IS IT FOR YOU TO PAY FOR THE VERY BASICS LIKE FOOD, HOUSING, MEDICAL CARE, AND HEATING?: PATIENT DECLINED

## 2023-12-15 NOTE — PROGRESS NOTES
Yanet Kenyon   :  1960  12/15/2023    ASSESSMENT/PLAN:   Essential hypertension  Assessment & Plan:  Borderline control. Discussed adding HCTZ to the amlodipine 10 mg but will hold off as she anticipates being able to get much more consistent exercise with her upcoming residential. Prediabetes  Assessment & Plan:  Asymptomatic. Continue to work on diet and exercise. Check A1c today. Orders:  -     Hemoglobin A1C; Future  Acquired hypothyroidism  Assessment & Plan:  Stable. Check TSH and adjust dose if indicated   Orders:  -     TSH with Reflex; Future  Hypercholesterolemia  Assessment & Plan:  Asymptomatic, currently manages with lifestyle. Recheck lipid profile today. Orders:  -     Comprehensive Metabolic Panel; Future  -     Lipid Panel; Future  Episode of recurrent major depressive disorder, unspecified depression episode severity ([address])  Assessment & Plan:  Symptoms improved, never took sertraline. Return in about 6 months (around 6/15/2024). SUBJECTIVE     58 y.o. female established patient here for: Follow-up    Retiring in March. Bone chips in right elbow, difficulty straightening. Never started sertraline. Doing ok, especially since decided to retire.     Review of Systems    Outpatient Medications Marked as Taking for the 12/15/23 encounter (Office Visit) with Radha Hernandez MD   Medication Sig Dispense Refill    amLODIPine (NORVASC) 10 MG tablet Take 1 tablet by mouth daily 90 tablet 1    levothyroxine (SYNTHROID) 75 MCG tablet TAKE ONE TABLET BY MOUTH DAILY ON AN EMPTY STOMACH TAKE WITH WATER AND WAIT 30 MINUTES BEFORE EATING OR TAKING OTHER MEDS, 90 tablet 0    meloxicam (MOBIC) 15 MG tablet Take 1 tablet by mouth daily 30 tablet 3       OBJECTIVE:  Vitals:    12/15/23 0831 12/15/23 0901   BP: 138/70 138/84   Site: Right Upper Arm    Position: Sitting    Cuff Size: Large Adult    Pulse: 86    SpO2: 97%    Weight: 100.3 kg (221 lb 1.6 oz)    Height: 1.676 m (5' 6\")

## 2023-12-15 NOTE — ASSESSMENT & PLAN NOTE
Borderline control. Discussed adding HCTZ to the amlodipine 10 mg but will hold off as she anticipates being able to get much more consistent exercise with her upcoming [facility].

## 2023-12-16 LAB
EST. AVERAGE GLUCOSE BLD GHB EST-MCNC: 128.4 MG/DL
HBA1C MFR BLD: 6.1 %

## 2023-12-17 PROBLEM — R74.8 ELEVATED LIVER ENZYMES: Status: ACTIVE | Noted: 2023-12-17

## 2024-01-26 ENCOUNTER — OFFICE VISIT (OUTPATIENT)
Dept: INTERNAL MEDICINE CLINIC | Age: 64
End: 2024-01-26
Payer: COMMERCIAL

## 2024-01-26 ENCOUNTER — TELEPHONE (OUTPATIENT)
Dept: INTERNAL MEDICINE CLINIC | Age: 64
End: 2024-01-26

## 2024-01-26 VITALS
HEIGHT: 66 IN | DIASTOLIC BLOOD PRESSURE: 88 MMHG | WEIGHT: 218 LBS | OXYGEN SATURATION: 97 % | HEART RATE: 87 BPM | BODY MASS INDEX: 35.03 KG/M2 | SYSTOLIC BLOOD PRESSURE: 144 MMHG

## 2024-01-26 DIAGNOSIS — G47.33 OBSTRUCTIVE SLEEP APNEA SYNDROME: Chronic | ICD-10-CM

## 2024-01-26 DIAGNOSIS — Z01.818 PRE-OP EVALUATION: ICD-10-CM

## 2024-01-26 DIAGNOSIS — M24.021 LOOSE BODY IN RIGHT ELBOW: Primary | ICD-10-CM

## 2024-01-26 DIAGNOSIS — I10 ESSENTIAL HYPERTENSION: Chronic | ICD-10-CM

## 2024-01-26 PROCEDURE — 93000 ELECTROCARDIOGRAM COMPLETE: CPT | Performed by: INTERNAL MEDICINE

## 2024-01-26 PROCEDURE — 3079F DIAST BP 80-89 MM HG: CPT | Performed by: INTERNAL MEDICINE

## 2024-01-26 PROCEDURE — 3077F SYST BP >= 140 MM HG: CPT | Performed by: INTERNAL MEDICINE

## 2024-01-26 PROCEDURE — 99214 OFFICE O/P EST MOD 30 MIN: CPT | Performed by: INTERNAL MEDICINE

## 2024-01-26 RX ORDER — HYDROCHLOROTHIAZIDE 12.5 MG/1
12.5 TABLET ORAL EVERY MORNING
Qty: 30 TABLET | Refills: 5 | Status: SHIPPED | OUTPATIENT
Start: 2024-01-26

## 2024-01-26 ASSESSMENT — ENCOUNTER SYMPTOMS
COUGH: 0
SHORTNESS OF BREATH: 0

## 2024-01-26 NOTE — TELEPHONE ENCOUNTER
Jennifer with [facility] is calling because patient had a Pre-op this morning and they are needing to have the \"signed\" H&P and also to have  review the EKG that was done.  Please fax to [phone] Attn:  Jennifer.

## 2024-01-26 NOTE — PATIENT INSTRUCTIONS
Hold supplements 1 week before surgery.  Avoid aspirin, ibuprofen, motrin, aleve, or naprosyn for 1 week prior to surgery or longer if instructed by surgeon.

## 2024-01-26 NOTE — PROGRESS NOTES
[facility]- Internal Medicine  Preoperative Consultation      2024    Tracy Kenyon  :  1960    Chief Complaint   Patient presents with    Pre-op Exam     pre op appt for right elbow surgery on 2024 with Dr Brooke at [place]        HPI:  This patient presents to the office today for a preoperative consultation at the request of Dr. Brooke to assess stability of patient's medical condition(s) listed below. She will be having arthroscopy right elbow with loose body removal. revision of left total knee arthroplasty on 2024.    Ongoing pain in right elbow, issues with locking. Found to have loose bodies on MRI as well as torn tendon.    Review of Systems   Constitutional:  Negative for chills and fever.   Respiratory:  Negative for cough and shortness of breath.    Cardiovascular:  Negative for chest pain and palpitations.   Skin:  Negative for rash.   Hematological:  Does not bruise/bleed easily.     Known anesthesia problems: nausea   Bleeding risk: No recent or remote history of abnormal bleeding  Personal or FH of DVT/PE: Yes -  family hx of DVT. Patient also has had hx of DVT post surgical 1990. Received prophylaxis for subsequent ortho procedures   Recent steroid use: no  Exercise tolerance before surgery at least 4 METS (Can walk up a flight of steps or a hill, raking  leaves, weeding, pushing a power mower or walk on level ground at 3 to 4 mph): Yes   Patient objection to receiving blood products: No    Past Medical History:   Diagnosis Date    Abnormal finding on EKG 2013    Normal stress myoview 3/21/13     Acquired hypothyroidism 2012    Arthritis     widespread, former ballerina    Cancer (HCC) 2021    Degenerative disc disease, cervical     cervical  disc fusion C4-C7; Dr. Lott    Depression 12/10/2018    History of depression     History of ductal carcinoma in situ (DCIS) of breast 2022    S/p bilateral mastectomy 3/2022.

## 2024-01-26 NOTE — ASSESSMENT & PLAN NOTE
Untreated, intolerant of CPAP.  Patient aware of increased risk for postoperative complication.    Minimize narcotic pain medication postoperatively.  Sleep with head of bed slightly elevated.

## 2024-01-26 NOTE — ASSESSMENT & PLAN NOTE
Controlled but borderline high.  Okay to proceed with surgery.  To take amlodipine morning of surgery.  Subsequently, will start HCTZ 12.5 mg daily

## 2024-05-02 RX ORDER — MELOXICAM 15 MG/1
15 TABLET ORAL DAILY
Qty: 30 TABLET | Refills: 2 | Status: SHIPPED | OUTPATIENT
Start: 2024-05-02

## 2024-05-02 NOTE — TELEPHONE ENCOUNTER
Medication:   Requested Prescriptions     Pending Prescriptions Disp Refills    meloxicam (MOBIC) 15 MG tablet [Pharmacy Med Name: MELOXICAM 15 MG TABLET] 30 tablet 3     Sig: TAKE ONE TABLET BY MOUTH DAILY     Last Filled:  5/1/23    Last appt: 1/26/2024   Next appt: Visit date not found    Last OARRS:        No data to display

## 2024-06-17 ENCOUNTER — OFFICE VISIT (OUTPATIENT)
Dept: FAMILY MEDICINE CLINIC | Age: 64
End: 2024-06-17
Payer: MEDICARE

## 2024-06-17 VITALS
SYSTOLIC BLOOD PRESSURE: 124 MMHG | OXYGEN SATURATION: 95 % | DIASTOLIC BLOOD PRESSURE: 84 MMHG | HEART RATE: 80 BPM | WEIGHT: 212 LBS | BODY MASS INDEX: 34.22 KG/M2

## 2024-06-17 DIAGNOSIS — M54.2 CERVICALGIA: ICD-10-CM

## 2024-06-17 DIAGNOSIS — G47.33 OSA (OBSTRUCTIVE SLEEP APNEA): Primary | ICD-10-CM

## 2024-06-17 DIAGNOSIS — I10 HYPERTENSION, UNSPECIFIED TYPE: ICD-10-CM

## 2024-06-17 PROCEDURE — 3079F DIAST BP 80-89 MM HG: CPT | Performed by: STUDENT IN AN ORGANIZED HEALTH CARE EDUCATION/TRAINING PROGRAM

## 2024-06-17 PROCEDURE — 1123F ACP DISCUSS/DSCN MKR DOCD: CPT | Performed by: STUDENT IN AN ORGANIZED HEALTH CARE EDUCATION/TRAINING PROGRAM

## 2024-06-17 PROCEDURE — 3074F SYST BP LT 130 MM HG: CPT | Performed by: STUDENT IN AN ORGANIZED HEALTH CARE EDUCATION/TRAINING PROGRAM

## 2024-06-17 PROCEDURE — 99214 OFFICE O/P EST MOD 30 MIN: CPT | Performed by: STUDENT IN AN ORGANIZED HEALTH CARE EDUCATION/TRAINING PROGRAM

## 2024-06-17 SDOH — HEALTH STABILITY: PHYSICAL HEALTH: ON AVERAGE, HOW MANY MINUTES DO YOU ENGAGE IN EXERCISE AT THIS LEVEL?: 20 MIN

## 2024-06-17 SDOH — HEALTH STABILITY: PHYSICAL HEALTH: ON AVERAGE, HOW MANY DAYS PER WEEK DO YOU ENGAGE IN MODERATE TO STRENUOUS EXERCISE (LIKE A BRISK WALK)?: 4 DAYS

## 2024-06-17 ASSESSMENT — PATIENT HEALTH QUESTIONNAIRE - PHQ9
SUM OF ALL RESPONSES TO PHQ QUESTIONS 1-9: 3
SUM OF ALL RESPONSES TO PHQ QUESTIONS 1-9: 3
7. TROUBLE CONCENTRATING ON THINGS, SUCH AS READING THE NEWSPAPER OR WATCHING TELEVISION: NOT AT ALL
9. THOUGHTS THAT YOU WOULD BE BETTER OFF DEAD, OR OF HURTING YOURSELF: NOT AT ALL
2. FEELING DOWN, DEPRESSED OR HOPELESS: NOT AT ALL
6. FEELING BAD ABOUT YOURSELF - OR THAT YOU ARE A FAILURE OR HAVE LET YOURSELF OR YOUR FAMILY DOWN: NOT AT ALL
SUM OF ALL RESPONSES TO PHQ QUESTIONS 1-9: 3
10. IF YOU CHECKED OFF ANY PROBLEMS, HOW DIFFICULT HAVE THESE PROBLEMS MADE IT FOR YOU TO DO YOUR WORK, TAKE CARE OF THINGS AT HOME, OR GET ALONG WITH OTHER PEOPLE: SOMEWHAT DIFFICULT
SUM OF ALL RESPONSES TO PHQ QUESTIONS 1-9: 3
5. POOR APPETITE OR OVEREATING: SEVERAL DAYS
SUM OF ALL RESPONSES TO PHQ9 QUESTIONS 1 & 2: 0
1. LITTLE INTEREST OR PLEASURE IN DOING THINGS: NOT AT ALL
3. TROUBLE FALLING OR STAYING ASLEEP: SEVERAL DAYS
4. FEELING TIRED OR HAVING LITTLE ENERGY: SEVERAL DAYS
8. MOVING OR SPEAKING SO SLOWLY THAT OTHER PEOPLE COULD HAVE NOTICED. OR THE OPPOSITE, BEING SO FIGETY OR RESTLESS THAT YOU HAVE BEEN MOVING AROUND A LOT MORE THAN USUAL: NOT AT ALL

## 2024-06-17 ASSESSMENT — ENCOUNTER SYMPTOMS
ABDOMINAL PAIN: 0
SHORTNESS OF BREATH: 0

## 2024-06-17 NOTE — PROGRESS NOTES
Tracy Kenyon is a 64 y.o.female who presents with   Chief Complaint   Patient presents with    Establish Care     New to provider   Portions of this chart may have been created with voice recognition software. Occasional wrong-word or \"sound-alike\" substitutions may have occurred due to the inherent limitations of voice recognition software. Read the chart carefully and recognize, using context, where these substitutions have occurred      Patient reports chronic neck pain.  States that recently she has had of flare of her neck pain which is responding to medrol dose pack and robaxin.     Exercise: somewhat active  Diet: Well balanced  Substance use: Denies  Stress: Manageable  Sleep: ~7 hours per night  Feels safe at home               Review of Systems   Constitutional:  Negative for fatigue.   Eyes:  Negative for visual disturbance.   Respiratory:  Negative for shortness of breath.    Cardiovascular:  Negative for chest pain.   Gastrointestinal:  Negative for abdominal pain.   Musculoskeletal:  Positive for neck pain.   Skin:  Negative for rash.   Neurological:  Negative for dizziness, light-headedness and headaches.        Past Medical History:   Diagnosis Date    Abnormal finding on EKG 03/21/2013    Normal stress myoview 3/21/13     Acquired hypothyroidism 06/19/2012    Arthritis     widespread, former ballerina    Cancer (HCC) 09/01/2021    Degenerative disc disease, cervical     cervical  disc fusion C4-C7; Dr. Lott    Depression 12/10/2018    History of breast cancer     History of depression     History of ductal carcinoma in situ (DCIS) of breast 03/02/2022    S/p bilateral mastectomy 3/2022.  Negative genetic screening [facility]    History of DVT (deep vein thrombosis) 1991    s/p arthroscopy, left    Hypercholesterolemia 07/21/2020    The 10-year ASCVD risk score (Mahnazsarah BARNES Jr., et al., 2013) is: 4.3%   Values used to calculate the score:     Age: 60 years     Sex: Female     Is Non-

## 2024-06-17 NOTE — PATIENT INSTRUCTIONS
Guidelines for care with Dr. Davidson and use of [facility]:     - Please allow 72 hours for response to [employer] Messages. If your concern cannot wait, please schedule an office visit.   - Please allow 72 hours for my comment on blood work or imaging. Occasionally there may be delays.  - If you have specific questions regarding your blood work or imaging, please schedule an office visit.   - If you have a new symptom, please schedule an office visit.   - Medication changes are only made at appointments. If you require a medication change, please schedule an office visit.   - Antibiotics cannot be prescribed without an in office evaluation.   - Controlled substances require IN PERSON office visits every 3 months.  - If you are on a controlled substance, a drug screen will be performed. I will require a negative drug screen.  - If you are late to your appointment, our visit time is limited.  - We can discuss 1-2 problems during follow up appointments, these are 15 minutes long.   - If you have multiple concerns, feel free to schedule multiple appointments. We cannot extend our visit time past 15 minutes regardless of the time the appointment is scheduled, out of consideration for all staff.  - Annual Physical exams are for discussion of Chronic Conditions and general wellness. We may discuss 1-2 new issues if time allows. You can always schedule a follow up appointment.     I would like to provide adequate and thorough attention to your care, which is why I limit discussion of 1-2 problems at a time. You are always welcome to schedule follow-ups with me for any new concerns.    I am here to support you and happy to work with you!    Dr. Davidson

## 2025-06-16 ENCOUNTER — TELEPHONE (OUTPATIENT)
Dept: FAMILY MEDICINE CLINIC | Age: 65
End: 2025-06-16

## 2025-06-16 DIAGNOSIS — K11.8 PAROTID MASS: Primary | ICD-10-CM

## 2025-06-16 NOTE — TELEPHONE ENCOUNTER
[phone] ([place])     Patient called stating that she's had facial pain descending into her left ear and down her neck since last year.     She suspects that she has some form of cancer, as she's had a carotid tissue removal there for pre-cancer and a fused neck. She's heavily concerned and would like a referral to see an oncologist to be placed in her chart that specializes in cancer as well.

## 2025-06-16 NOTE — TELEPHONE ENCOUNTER
Tell her the referral is in place. She doesn't have to come in for this but she should schedule her physical soon.

## 2025-06-17 NOTE — TELEPHONE ENCOUNTER
Left a message for the patient giving her the contact number for Dr. Ishaan Saucedo (the referral Dr. Davidson put in for her). I also told her to call the office to let us know she received the message and is still wanting to cancel or keep her appointment for today 6-17-25. Dr. Davidson stated it was not necessary for her to come in, but does want her to schedule her physical soon.

## 2025-06-17 NOTE — TELEPHONE ENCOUNTER
Patient has cancelled her appointment for today 6-17-25 and has rescheduled for 6-18-25 to do her annual medicare wellness visit.

## 2025-06-18 ENCOUNTER — OFFICE VISIT (OUTPATIENT)
Dept: FAMILY MEDICINE CLINIC | Age: 65
End: 2025-06-18

## 2025-06-18 VITALS
HEIGHT: 66 IN | HEART RATE: 75 BPM | OXYGEN SATURATION: 97 % | WEIGHT: 206 LBS | DIASTOLIC BLOOD PRESSURE: 84 MMHG | BODY MASS INDEX: 33.11 KG/M2 | SYSTOLIC BLOOD PRESSURE: 128 MMHG

## 2025-06-18 DIAGNOSIS — C50.912 PRIMARY CANCER OF LEFT FEMALE BREAST (HCC): ICD-10-CM

## 2025-06-18 DIAGNOSIS — Z00.00 WELCOME TO MEDICARE PREVENTIVE VISIT: Primary | ICD-10-CM

## 2025-06-18 DIAGNOSIS — Z00.00 MEDICARE ANNUAL WELLNESS VISIT, SUBSEQUENT: ICD-10-CM

## 2025-06-18 DIAGNOSIS — Z13.31 DEPRESSION SCREEN: ICD-10-CM

## 2025-06-18 DIAGNOSIS — F10.90 ALCOHOL USE: ICD-10-CM

## 2025-06-18 DIAGNOSIS — E55.9 VITAMIN D DEFICIENCY: ICD-10-CM

## 2025-06-18 DIAGNOSIS — R79.9 ABNORMAL FINDING OF BLOOD CHEMISTRY, UNSPECIFIED: ICD-10-CM

## 2025-06-18 DIAGNOSIS — F32.A MILD DEPRESSION: ICD-10-CM

## 2025-06-18 DIAGNOSIS — R53.83 OTHER FATIGUE: ICD-10-CM

## 2025-06-18 DIAGNOSIS — Z78.0 POSTMENOPAUSAL: ICD-10-CM

## 2025-06-18 RX ORDER — MELOXICAM 15 MG/1
15 TABLET ORAL DAILY
Qty: 30 TABLET | Refills: 2 | Status: SHIPPED | OUTPATIENT
Start: 2025-06-18

## 2025-06-18 RX ORDER — DULOXETIN HYDROCHLORIDE 20 MG/1
20 CAPSULE, DELAYED RELEASE ORAL DAILY
Qty: 30 CAPSULE | Refills: 3 | Status: SHIPPED | OUTPATIENT
Start: 2025-06-18

## 2025-06-18 SDOH — ECONOMIC STABILITY: FOOD INSECURITY: WITHIN THE PAST 12 MONTHS, THE FOOD YOU BOUGHT JUST DIDN'T LAST AND YOU DIDN'T HAVE MONEY TO GET MORE.: NEVER TRUE

## 2025-06-18 SDOH — ECONOMIC STABILITY: FOOD INSECURITY: WITHIN THE PAST 12 MONTHS, YOU WORRIED THAT YOUR FOOD WOULD RUN OUT BEFORE YOU GOT MONEY TO BUY MORE.: NEVER TRUE

## 2025-06-18 ASSESSMENT — LIFESTYLE VARIABLES
HOW OFTEN DO YOU HAVE A DRINK CONTAINING ALCOHOL: 4 OR MORE TIMES A WEEK
HOW OFTEN DURING THE LAST YEAR HAVE YOU BEEN UNABLE TO REMEMBER WHAT HAPPENED THE NIGHT BEFORE BECAUSE YOU HAD BEEN DRINKING: NEVER
HOW OFTEN DURING THE LAST YEAR HAVE YOU FOUND THAT YOU WERE NOT ABLE TO STOP DRINKING ONCE YOU HAD STARTED: NEVER
HOW MANY STANDARD DRINKS CONTAINING ALCOHOL DO YOU HAVE ON A TYPICAL DAY: 3 OR 4
HAS A RELATIVE, FRIEND, DOCTOR, OR ANOTHER HEALTH PROFESSIONAL EXPRESSED CONCERN ABOUT YOUR DRINKING OR SUGGESTED YOU CUT DOWN: NO
HOW OFTEN DURING THE LAST YEAR HAVE YOU FAILED TO DO WHAT WAS NORMALLY EXPECTED FROM YOU BECAUSE OF DRINKING: NEVER
HAVE YOU OR SOMEONE ELSE BEEN INJURED AS A RESULT OF YOUR DRINKING: NO
HOW OFTEN DURING THE LAST YEAR HAVE YOU HAD A FEELING OF GUILT OR REMORSE AFTER DRINKING: NEVER
HOW OFTEN DURING THE LAST YEAR HAVE YOU NEEDED AN ALCOHOLIC DRINK FIRST THING IN THE MORNING TO GET YOURSELF GOING AFTER A NIGHT OF HEAVY DRINKING: NEVER

## 2025-06-18 ASSESSMENT — PATIENT HEALTH QUESTIONNAIRE - PHQ9
6. FEELING BAD ABOUT YOURSELF - OR THAT YOU ARE A FAILURE OR HAVE LET YOURSELF OR YOUR FAMILY DOWN: NOT AT ALL
3. TROUBLE FALLING OR STAYING ASLEEP: NEARLY EVERY DAY
2. FEELING DOWN, DEPRESSED OR HOPELESS: NOT AT ALL
SUM OF ALL RESPONSES TO PHQ QUESTIONS 1-9: 4
SUM OF ALL RESPONSES TO PHQ QUESTIONS 1-9: 4
4. FEELING TIRED OR HAVING LITTLE ENERGY: SEVERAL DAYS
8. MOVING OR SPEAKING SO SLOWLY THAT OTHER PEOPLE COULD HAVE NOTICED. OR THE OPPOSITE, BEING SO FIGETY OR RESTLESS THAT YOU HAVE BEEN MOVING AROUND A LOT MORE THAN USUAL: NOT AT ALL
10. IF YOU CHECKED OFF ANY PROBLEMS, HOW DIFFICULT HAVE THESE PROBLEMS MADE IT FOR YOU TO DO YOUR WORK, TAKE CARE OF THINGS AT HOME, OR GET ALONG WITH OTHER PEOPLE: NOT DIFFICULT AT ALL
SUM OF ALL RESPONSES TO PHQ QUESTIONS 1-9: 4
9. THOUGHTS THAT YOU WOULD BE BETTER OFF DEAD, OR OF HURTING YOURSELF: NOT AT ALL
7. TROUBLE CONCENTRATING ON THINGS, SUCH AS READING THE NEWSPAPER OR WATCHING TELEVISION: NOT AT ALL
5. POOR APPETITE OR OVEREATING: NOT AT ALL
1. LITTLE INTEREST OR PLEASURE IN DOING THINGS: NOT AT ALL
SUM OF ALL RESPONSES TO PHQ QUESTIONS 1-9: 4

## 2025-06-18 ASSESSMENT — VISUAL ACUITY
OD_CC: 20/40
OS_CC: 20/25

## 2025-06-18 NOTE — PATIENT INSTRUCTIONS
Increase Fiber- [employer] Psyll[employer]k on [employer]  Mouth Guard for pain  Have blood work done while fasting

## 2025-06-18 NOTE — PROGRESS NOTES
Tracy Kenyon is a 65 y.o.female who presents with   Chief Complaint   Patient presents with    Medicare AW   .Medicare Annual Wellness Visit    Tracy Kenyon is here for Medicare AWV    Assessment & Plan  1. Jaw pain  - Symptoms suggest a possible diagnosis of TMJ disorder, likely exacerbated by teeth grinding at night.  - Increased pain and limited mobility noted.  - A mouthguard for teeth grinding was recommended as an initial intervention.  - Advised to obtain a generic mouthguard from a pharmacy or  like [employer].  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 Chest pain.  - Chest pain is likely muscular in nature, given its onset post-physical activity.  - No signs red flag signs  - Prescription for Cymbalta provided to manage chronic neuropathic pain from double mastectomy and mild depression and anxiety.  - Advised to reduce alcohol consumption and incorporate more vegetables and protein into the diet while limiting carbohydrate intake.    3. Osteoporosis screening.  - DEXA scan ordered to assess for osteoporosis.  - Blood work ordered to monitor blood sugar and cholesterol levels.    4. Mild depression  Patient completed PHQ-9 depression screening. Score =  4, indicating mild depression. Discussed symptoms, provided education on depression, and offered treatment options, including therapy and medication.  Follow-up in 4 weeks to reassess.”  Cymbalta Rxed   F/u in 4 weeks    5. Alcohol Use  Alcohol use cessation discussed   The 10-year ASCVD risk score (Lizette VIDAL, et al., 2019) is: 9.1%    Values used to calculate the score:      Age: 65 years      Sex: Female      Is Non- : No      Diabetic: No      Tobacco smoker: No      Systolic Blood Pressure: 128 mmHg      Is BP treated: Yes      HDL Cholesterol: 45 mg/dL      Total Cholesterol: 208 mg/dL  Repeat blood work ordered  Further management

## 2025-06-19 DIAGNOSIS — R53.83 OTHER FATIGUE: ICD-10-CM

## 2025-06-19 DIAGNOSIS — C50.912 PRIMARY CANCER OF LEFT FEMALE BREAST (HCC): ICD-10-CM

## 2025-06-19 DIAGNOSIS — R79.9 ABNORMAL FINDING OF BLOOD CHEMISTRY, UNSPECIFIED: ICD-10-CM

## 2025-06-19 DIAGNOSIS — Z00.00 MEDICARE ANNUAL WELLNESS VISIT, SUBSEQUENT: ICD-10-CM

## 2025-06-19 DIAGNOSIS — E55.9 VITAMIN D DEFICIENCY: ICD-10-CM

## 2025-06-19 LAB
25(OH)D3 SERPL-MCNC: 13.8 NG/ML
ALBUMIN SERPL-MCNC: 4.7 G/DL (ref 3.4–5)
ALBUMIN/GLOB SERPL: 1.9 {RATIO} (ref 1.1–2.2)
ALP SERPL-CCNC: 56 U/L (ref 40–129)
ALT SERPL-CCNC: 59 U/L (ref 10–40)
ANION GAP SERPL CALCULATED.3IONS-SCNC: 14 MMOL/L (ref 3–16)
AST SERPL-CCNC: 44 U/L (ref 15–37)
BASOPHILS # BLD: 0.1 K/UL (ref 0–0.2)
BASOPHILS NFR BLD: 0.9 %
BILIRUB SERPL-MCNC: 0.9 MG/DL (ref 0–1)
BUN SERPL-MCNC: 13 MG/DL (ref 7–20)
CALCIUM SERPL-MCNC: 10 MG/DL (ref 8.3–10.6)
CHLORIDE SERPL-SCNC: 95 MMOL/L (ref 99–110)
CHOLEST SERPL-MCNC: 237 MG/DL (ref 0–199)
CO2 SERPL-SCNC: 29 MMOL/L (ref 21–32)
CREAT SERPL-MCNC: 0.9 MG/DL (ref 0.6–1.2)
DEPRECATED RDW RBC AUTO: 13.9 % (ref 12.4–15.4)
EOSINOPHIL # BLD: 0.1 K/UL (ref 0–0.6)
EOSINOPHIL NFR BLD: 1.4 %
EST. AVERAGE GLUCOSE BLD GHB EST-MCNC: 125.5 MG/DL
FOLATE SERPL-MCNC: 8.8 NG/ML (ref 4.78–24.2)
GFR SERPLBLD CREATININE-BSD FMLA CKD-EPI: 70 ML/MIN/{1.73_M2}
GLUCOSE SERPL-MCNC: 125 MG/DL (ref 70–99)
HBA1C MFR BLD: 6 %
HCT VFR BLD AUTO: 44.4 % (ref 36–48)
HDLC SERPL-MCNC: 58 MG/DL (ref 40–60)
HGB BLD-MCNC: 15.1 G/DL (ref 12–16)
LDLC SERPL CALC-MCNC: 149 MG/DL
LYMPHOCYTES # BLD: 1.3 K/UL (ref 1–5.1)
LYMPHOCYTES NFR BLD: 19.8 %
MCH RBC QN AUTO: 29.5 PG (ref 26–34)
MCHC RBC AUTO-ENTMCNC: 34.1 G/DL (ref 31–36)
MCV RBC AUTO: 86.5 FL (ref 80–100)
MONOCYTES # BLD: 0.5 K/UL (ref 0–1.3)
MONOCYTES NFR BLD: 8.3 %
NEUTROPHILS # BLD: 4.6 K/UL (ref 1.7–7.7)
NEUTROPHILS NFR BLD: 69.6 %
PLATELET # BLD AUTO: 215 K/UL (ref 135–450)
PMV BLD AUTO: 8.6 FL (ref 5–10.5)
POTASSIUM SERPL-SCNC: 3.6 MMOL/L (ref 3.5–5.1)
PROT SERPL-MCNC: 7.2 G/DL (ref 6.4–8.2)
RBC # BLD AUTO: 5.13 M/UL (ref 4–5.2)
SODIUM SERPL-SCNC: 138 MMOL/L (ref 136–145)
TRIGL SERPL-MCNC: 149 MG/DL (ref 0–150)
TSH SERPL DL<=0.005 MIU/L-ACNC: 1.92 UIU/ML (ref 0.27–4.2)
VIT B12 SERPL-MCNC: 308 PG/ML (ref 211–911)
VLDLC SERPL CALC-MCNC: 30 MG/DL
WBC # BLD AUTO: 6.6 K/UL (ref 4–11)

## 2025-06-20 ENCOUNTER — RESULTS FOLLOW-UP (OUTPATIENT)
Dept: FAMILY MEDICINE CLINIC | Age: 65
End: 2025-06-20

## 2025-06-20 RX ORDER — ERGOCALCIFEROL 1.25 MG/1
50000 CAPSULE, LIQUID FILLED ORAL WEEKLY
Qty: 12 CAPSULE | Refills: 1 | Status: SHIPPED | OUTPATIENT
Start: 2025-06-20

## 2025-06-25 RX ORDER — ATORVASTATIN CALCIUM 40 MG/1
40 TABLET, FILM COATED ORAL DAILY
Qty: 90 TABLET | Refills: 0 | Status: SHIPPED | OUTPATIENT
Start: 2025-06-25

## 2025-07-21 ENCOUNTER — HOSPITAL ENCOUNTER (OUTPATIENT)
Dept: GENERAL RADIOLOGY | Age: 65
Discharge: HOME | End: 2025-07-21
Attending: STUDENT IN AN ORGANIZED HEALTH CARE EDUCATION/TRAINING PROGRAM
Payer: MEDICARE

## 2025-07-21 DIAGNOSIS — Z78.0 POSTMENOPAUSAL: ICD-10-CM

## 2025-07-21 PROCEDURE — 77080 DXA BONE DENSITY AXIAL: CPT

## (undated) DEVICE — GOWN SIRUS NONREIN XL W/TWL: Brand: MEDLINE INDUSTRIES, INC.

## (undated) DEVICE — TOWEL,OR,DSP,ST,BLUE,STD,4/PK,20PK/CS: Brand: MEDLINE

## (undated) DEVICE — DRAPE,CHEST,FENES,15X10,STERIL: Brand: MEDLINE

## (undated) DEVICE — APPLICATOR MEDICATED 26 CC SOLUTION HI LT ORNG CHLORAPREP

## (undated) DEVICE — GOWN,AURORA,NONREINF,RAGLAN,XXL,STERILE: Brand: MEDLINE

## (undated) DEVICE — GAUZE,SPONGE,4"X4",8PLY,STRL,LF,10/TRAY: Brand: MEDLINE

## (undated) DEVICE — GLOVE ORANGE PI 8   MSG9080

## (undated) DEVICE — CONTINU-FLO SOLUTION SET, 3 INJECTION SITES, MALE LUER LOCK ADAPTER WITH RETRACTABLE COLLAR: Brand: INTERLINK/CONTINU-FLO

## (undated) DEVICE — BLADE ES ELASTOMERIC COAT INSUL DURABLE BEND UPTO 90DEG

## (undated) DEVICE — MAJOR SET UP PK

## (undated) DEVICE — SUTURE MCRYL + SZ 3-0 L27IN ABSRB UD L26MM SH 1/2 CIR MCP416H

## (undated) DEVICE — INTENDED USE FOR SURGICAL MARKING ON INTACT SKIN, ALSO PROVIDES A PERMANENT METHOD OF IDENTIFYING OBJECTS IN THE OPERATING ROOM: Brand: WRITESITE® PLUS MINI PREP RESISTANT MARKER

## (undated) DEVICE — DRAPE THER FLUID WARMING 66X44 IN FLAT SLUSH DBL DISC ORS

## (undated) DEVICE — PACK PROCEDURE SURG EXTREMITY MFFOP CUST

## (undated) DEVICE — DEVICE SPEC PERF COMPR PLT FOR SPEC RADIOGRAPHY TRANSPEC

## (undated) DEVICE — DECANTER FLD 9IN ST BG FOR ASEP TRNSF OF FLD

## (undated) DEVICE — SET EXTN PRIMING 4.9ML L30IN INCL SLDE CLMP SPIN M LUERLOCK

## (undated) DEVICE — PENCIL SMK EVAC MPLR ULT VAC E Z CLN TLS MEGADYNE

## (undated) DEVICE — YANKAUER,BULB TIP,W/O VENT,RIGID,STERILE: Brand: MEDLINE

## (undated) DEVICE — SUTURE VCRL + SZ 4-0 L27IN ABSRB UD PS-2 3/8 CIR REV CUT VCP426H

## (undated) DEVICE — HYPODERMIC SAFETY NEEDLE: Brand: MAGELLAN

## (undated) DEVICE — PENCIL ES ULT VAC W TELSCP NOSE EZ CLN BLDE 10FT

## (undated) DEVICE — SUTURE ETHLN SZ 3-0 L18IN NONABSORBABLE BLK PS-2 L19MM 3/8 1669H

## (undated) DEVICE — SUTURE VCRL + SZ 3-0 L18IN ABSRB UD SH 1/2 CIR TAPERCUT NDL VCP864D

## (undated) DEVICE — PAD ABSRB W8XL10IN ABD HYDROPHOBIC NONWOVEN THCK LAYR CELOS

## (undated) DEVICE — GOWN SIRUS NONREIN LG W/TWL: Brand: MEDLINE INDUSTRIES, INC.

## (undated) DEVICE — Device

## (undated) DEVICE — SOLUTION IRRIG 500ML 0.9% SOD CHL USP POUR PLAS BTL

## (undated) DEVICE — MEDICINE CUP, GRADUATED, STER: Brand: MEDLINE

## (undated) DEVICE — PIN SFTY M L1.5IN S STL FOR GRP HLD RET

## (undated) DEVICE — SYRINGE MED 50ML LUERLOCK TIP

## (undated) DEVICE — Device: Brand: MEDEX

## (undated) DEVICE — SUTURE PROL SZ 4-0 L18IN NONABSORBABLE BLU L19MM FS-2 3/8 8683G

## (undated) DEVICE — BLADE ES L6IN ELASTOMERIC COAT INSUL DURABLE BEND UPTO

## (undated) DEVICE — SYRINGE MED 10ML TRNSLUC BRL PLUNG BLK MRK POLYPR CTRL

## (undated) DEVICE — APPLIER CLP L9.38IN M LIG TI DISP STR RNG HNDL LIGACLP

## (undated) DEVICE — KIT INFUS PMP 100ML 2M/HR SOAK CATH L2.5IN N NARC ON-Q

## (undated) DEVICE — BLANKET WRM W40.2XL55.9IN IORT LO BODY + MISTRAL AIR

## (undated) DEVICE — SPONGE LAP W18XL18IN WHT COT 4 PLY FLD STRUNG RADPQ DISP ST

## (undated) DEVICE — BLADE ES L6IN ELASTOMERIC COAT EXT DURABLE BEND UPTO 90DEG

## (undated) DEVICE — SPECIMEN ORIENTATION CHARMS, SIX DISTINCTLY SHAPED STERILE 10MM CHARMS: Brand: MARGINMAP

## (undated) DEVICE — MASC TURNOVER KIT: Brand: MEDLINE INDUSTRIES, INC.

## (undated) DEVICE — SHEET,DRAPE,53X77,STERILE: Brand: MEDLINE

## (undated) DEVICE — SUTURE VCRL + SZ 2-0 L27IN ABSRB WHT SH 1/2 CIR TAPERCUT VCP417H

## (undated) DEVICE — GLOVE SURG SZ 7 L12IN THK7.5MIL DK GRN LTX FREE MSG6570] MEDLINE INDUSTRIES INC]

## (undated) DEVICE — PEN: MARKING STD 100/CS: Brand: MEDICAL ACTION INDUSTRIES

## (undated) DEVICE — MASTISOL ADHESIVE LIQ 2/3ML

## (undated) DEVICE — PAD N ADH W3XL4IN POLY COT SFT PERF FLM EASILY CUT ABSRB

## (undated) DEVICE — STAPLER SKIN H3.9MM WIRE DIA0.58MM CRWN 6.9MM 35 STPL ROT

## (undated) DEVICE — SPONGE DRN W4XL4IN RAYON/POLYESTER 6 PLY NONWOVEN PRECUT

## (undated) DEVICE — PROBE SET W/ DRP

## (undated) DEVICE — ADHESIVE SKIN CLSR 0.7ML TOP DERMBND ADV

## (undated) DEVICE — GLOVE SURG SZ 6.5 L11.2IN FNGR THK9.8MIL STRW LTX POLYMER

## (undated) DEVICE — 3M™ STERI-DRAPE™ INSTRUMENT POUCH 1018L: Brand: STERI-DRAPE™

## (undated) DEVICE — NEEDLE HYPO 22GA L1.5IN BLK POLYPR HUB S STL REG BVL STR

## (undated) DEVICE — TUBING BRST PMP L9FT DISP LAMIS

## (undated) DEVICE — PROVE COVER: Brand: UNBRANDED

## (undated) DEVICE — 3M™ TEGADERM™ TRANSPARENT FILM DRESSING FRAME STYLE, 1626W, 4 IN X 4-3/4 IN (10 CM X 12 CM), 50/CT 4CT/CASE: Brand: 3M™ TEGADERM™

## (undated) DEVICE — STRIP,CLOSURE,WOUND,MEDI-STRIP,1/2X4: Brand: MEDLINE

## (undated) DEVICE — SUTURE ETHBND EXCEL SZ 0 L30IN NONABSORBABLE GRN CT1 L36MM X424H

## (undated) DEVICE — TOTAL TRAY, DB, 100% SILI FOLEY, 16FR 10: Brand: MEDLINE

## (undated) DEVICE — 3M™ IOBAN™ 2 ANTIMICROBIAL INCISE DRAPE 6650EZ: Brand: IOBAN™ 2

## (undated) DEVICE — ELECTRODE PT RET AD L9FT HI MOIST COND ADH HYDRGEL CORDED

## (undated) DEVICE — INTENDED FOR TISSUE SEPARATION, AND OTHER PROCEDURES THAT REQUIRE A SHARP SURGICAL BLADE TO PUNCTURE OR CUT.: Brand: BARD-PARKER ® STAINLESS STEEL BLADES

## (undated) DEVICE — WET SKIN PREP TRAY: Brand: MEDLINE INDUSTRIES, INC.

## (undated) DEVICE — BNDG,ELSTC,MATRIX,STRL,6"X5YD,LF,HOOK&LP: Brand: MEDLINE

## (undated) DEVICE — SYRINGE, LUER LOCK, 10ML: Brand: MEDLINE

## (undated) DEVICE — BNDG,ELSTC,MATRIX,STRL,4"X5YD,LF,HOOK&LP: Brand: MEDLINE

## (undated) DEVICE — STOPCOCK IV 3 WAY 2 FEMALE 1 MALE LEUR

## (undated) DEVICE — Z DISCONTINUED PER MEDLINE TRAY SKIN PREP DRY W/ PREM GLV APPLICATORS GZ TWL OVERWRAP

## (undated) DEVICE — DRAIN SURG W10MMXL20CM SIL FULL PERF HUBLESS FLAT RADPQ

## (undated) DEVICE — SPONGE,PEANUT,XRAY,ST,SM,3/8",5/CARD: Brand: MEDLINE INDUSTRIES, INC.

## (undated) DEVICE — 3M™ STERI-STRIP™ REINFORCED ADHESIVE SKIN CLOSURES, R1547, 1/2 IN X 4 IN (12 MM X 100 MM), 6 STRIPS/ENVELOPE: Brand: 3M™ STERI-STRIP™

## (undated) DEVICE — DRAIN SURG 15FR RND FULL FLUT

## (undated) DEVICE — DRESSING GRMCDL 6 12FR D1N CNTR HOLE 4MM ANTMCRBL PRTCTVE DI

## (undated) DEVICE — ABDOMINAL PAD: Brand: DERMACEA

## (undated) DEVICE — SUTURE VCRL + SZ 3-0 L18IN ABSRB UD PS-2 3/8 CIR REV CUT VCP497H

## (undated) DEVICE — 3M™ TEGADERM™ TRANSPARENT FILM DRESSING FRAME STYLE, 1624W, 2-3/8 IN X 2-3/4 IN (6 CM X 7 CM), 100/CT 4CT/CASE: Brand: 3M™ TEGADERM™

## (undated) DEVICE — SUTURE MCRYL + SZ 4-0 L27IN ABSRB UD L19MM PS-2 3/8 CIR MCP426H

## (undated) DEVICE — PAD,ABDOMINAL,8"X10",ST,LF: Brand: MEDLINE